# Patient Record
Sex: MALE | Race: WHITE | NOT HISPANIC OR LATINO | Employment: UNEMPLOYED | URBAN - METROPOLITAN AREA
[De-identification: names, ages, dates, MRNs, and addresses within clinical notes are randomized per-mention and may not be internally consistent; named-entity substitution may affect disease eponyms.]

---

## 2019-04-29 ENCOUNTER — HOSPITAL ENCOUNTER (OUTPATIENT)
Dept: URGENT CARE | Facility: CLINIC | Age: 34
Setting detail: SPECIMEN
Discharge: HOME | End: 2019-04-29
Attending: FAMILY MEDICINE | Admitting: FAMILY MEDICINE

## 2019-04-29 LAB
BACTERIA ISLT: NORMAL
GRAM STN SPEC: NORMAL
Lab: NORMAL
MICRO REPORT STATUS: NORMAL
SPECIMEN SOURCE: NORMAL
SPECIMEN SOURCE: NORMAL

## 2021-04-26 ENCOUNTER — OFFICE VISIT (OUTPATIENT)
Dept: FAMILY MEDICINE CLINIC | Facility: CLINIC | Age: 36
End: 2021-04-26

## 2021-04-26 VITALS
WEIGHT: 181 LBS | OXYGEN SATURATION: 100 % | HEART RATE: 80 BPM | DIASTOLIC BLOOD PRESSURE: 87 MMHG | TEMPERATURE: 97.5 F | RESPIRATION RATE: 18 BRPM | SYSTOLIC BLOOD PRESSURE: 133 MMHG | BODY MASS INDEX: 26.81 KG/M2 | HEIGHT: 69 IN

## 2021-04-26 DIAGNOSIS — R53.82 CHRONIC FATIGUE: ICD-10-CM

## 2021-04-26 DIAGNOSIS — F32.89 OTHER DEPRESSION: Primary | ICD-10-CM

## 2021-04-26 DIAGNOSIS — E55.9 VITAMIN D DEFICIENCY: ICD-10-CM

## 2021-04-26 DIAGNOSIS — Z11.59 NEED FOR HEPATITIS C SCREENING TEST: ICD-10-CM

## 2021-04-26 DIAGNOSIS — R73.09 ELEVATED GLUCOSE: ICD-10-CM

## 2021-04-26 PROBLEM — B19.20 HEPATITIS C VIRUS INFECTION: Status: ACTIVE | Noted: 2021-04-26

## 2021-04-26 PROBLEM — A49.02 METHICILLIN RESISTANT STAPHYLOCOCCUS AUREUS INFECTION: Status: ACTIVE | Noted: 2018-09-30

## 2021-04-26 PROCEDURE — 99214 OFFICE O/P EST MOD 30 MIN: CPT | Performed by: FAMILY MEDICINE

## 2021-04-26 RX ORDER — NALOXONE HYDROCHLORIDE 4 MG/.1ML
SPRAY NASAL 2 TIMES DAILY PRN
COMMUNITY
Start: 2021-04-06

## 2021-04-26 RX ORDER — LEVETIRACETAM 1000 MG/1
1000 TABLET ORAL 2 TIMES DAILY
COMMUNITY
Start: 2021-04-19

## 2021-04-26 RX ORDER — BUPRENORPHINE HYDROCHLORIDE AND NALOXONE HYDROCHLORIDE DIHYDRATE 8; 2 MG/1; MG/1
2 TABLET SUBLINGUAL DAILY
COMMUNITY
Start: 2021-04-24

## 2021-04-29 ENCOUNTER — LAB (OUTPATIENT)
Dept: LAB | Facility: HOSPITAL | Age: 36
End: 2021-04-29

## 2021-04-29 DIAGNOSIS — E55.9 VITAMIN D DEFICIENCY: ICD-10-CM

## 2021-04-29 DIAGNOSIS — Z11.59 NEED FOR HEPATITIS C SCREENING TEST: ICD-10-CM

## 2021-04-29 DIAGNOSIS — R53.82 CHRONIC FATIGUE: ICD-10-CM

## 2021-04-29 DIAGNOSIS — R73.09 ELEVATED GLUCOSE: ICD-10-CM

## 2021-04-29 LAB
25(OH)D3 SERPL-MCNC: 27.6 NG/ML (ref 30–100)
DEPRECATED RDW RBC AUTO: 46 FL (ref 37–54)
EOSINOPHIL # BLD MANUAL: 0.05 10*3/MM3 (ref 0–0.4)
EOSINOPHIL NFR BLD MANUAL: 1 % (ref 0.3–6.2)
ERYTHROCYTE [DISTWIDTH] IN BLOOD BY AUTOMATED COUNT: 14.7 % (ref 12.3–15.4)
HBA1C MFR BLD: 5.7 % (ref 3.5–5.6)
HCT VFR BLD AUTO: 49.4 % (ref 37.5–51)
HCV AB SER DONR QL: REACTIVE
HGB BLD-MCNC: 16.4 G/DL (ref 13–17.7)
LYMPHOCYTES # BLD MANUAL: 1.77 10*3/MM3 (ref 0.7–3.1)
LYMPHOCYTES NFR BLD MANUAL: 33 % (ref 19.6–45.3)
LYMPHOCYTES NFR BLD MANUAL: 6 % (ref 5–12)
MCH RBC QN AUTO: 28.7 PG (ref 26.6–33)
MCHC RBC AUTO-ENTMCNC: 33.2 G/DL (ref 31.5–35.7)
MCV RBC AUTO: 86.5 FL (ref 79–97)
MONOCYTES # BLD AUTO: 0.32 10*3/MM3 (ref 0.1–0.9)
NEUTROPHILS # BLD AUTO: 3.16 10*3/MM3 (ref 1.7–7)
NEUTROPHILS NFR BLD MANUAL: 59 % (ref 42.7–76)
OVALOCYTES BLD QL SMEAR: NORMAL
PLAT MORPH BLD: NORMAL
PLATELET # BLD AUTO: 254 10*3/MM3 (ref 140–450)
PMV BLD AUTO: 10.8 FL (ref 6–12)
POIKILOCYTOSIS BLD QL SMEAR: NORMAL
RBC # BLD AUTO: 5.71 10*6/MM3 (ref 4.14–5.8)
T4 FREE SERPL-MCNC: 1.26 NG/DL (ref 0.93–1.7)
TSH SERPL DL<=0.05 MIU/L-ACNC: 0.95 UIU/ML (ref 0.27–4.2)
WBC # BLD AUTO: 5.35 10*3/MM3 (ref 3.4–10.8)
WBC MORPH BLD: NORMAL

## 2021-04-29 PROCEDURE — 86803 HEPATITIS C AB TEST: CPT

## 2021-04-29 PROCEDURE — 85007 BL SMEAR W/DIFF WBC COUNT: CPT

## 2021-04-29 PROCEDURE — 84439 ASSAY OF FREE THYROXINE: CPT

## 2021-04-29 PROCEDURE — 36415 COLL VENOUS BLD VENIPUNCTURE: CPT

## 2021-04-29 PROCEDURE — 85025 COMPLETE CBC W/AUTO DIFF WBC: CPT

## 2021-04-29 PROCEDURE — 84443 ASSAY THYROID STIM HORMONE: CPT

## 2021-04-29 PROCEDURE — 83036 HEMOGLOBIN GLYCOSYLATED A1C: CPT

## 2021-04-29 PROCEDURE — 82306 VITAMIN D 25 HYDROXY: CPT

## 2021-05-02 DIAGNOSIS — R76.8 HEPATITIS C ANTIBODY POSITIVE IN BLOOD: Primary | ICD-10-CM

## 2021-05-03 ENCOUNTER — TELEPHONE (OUTPATIENT)
Dept: FAMILY MEDICINE CLINIC | Facility: CLINIC | Age: 36
End: 2021-05-03

## 2021-05-03 NOTE — TELEPHONE ENCOUNTER
HUB to share    Hep c screening  +  refer to GI  Low vit d take daily vit d3 2000 daily  Prediabetic level, low carb diet  Other labs acceptable  Call [facility] for appt for the hep C follow-up. #[phone]. I faxed them the referral and lab results.    LVM informing pt

## 2021-05-07 ENCOUNTER — TELEPHONE (OUTPATIENT)
Dept: FAMILY MEDICINE CLINIC | Facility: CLINIC | Age: 36
End: 2021-05-07

## 2021-05-07 NOTE — TELEPHONE ENCOUNTER
Caller: CARLO STILES    Relationship: Emergency Contact    Best call back number: [phone]    What is the medical concern/diagnosis: MENTAL HEALTH    What specialty or service is being requested: MENTAL HEALTH    What is the provider, practice or medical service name: ?    What is the office location: ?    What is the office phone number: ?    Any additional details: PATIENT'S WIFE CALLED STATING SHE HAD CALLED [facility] AND THEY ADVISED THEY DIDN'T SEE PATIENTS FROM THE  COUNTY THEY LIVE IN. PATIENT'S WIFE WANTED TO KNOW COULD SHE JUST CALL ANOTHER PROVIDER OR DOES HE NEED ANOTHER REFERRAL.

## 2021-05-10 NOTE — TELEPHONE ENCOUNTER
HUB to share    Referral faxed to [facility]. They will contact pt to sched. But pt can call them to sched also.    [facility]  p# [phone]  f# [phone]

## 2022-03-15 ENCOUNTER — TELEPHONE (OUTPATIENT)
Dept: FAMILY MEDICINE CLINIC | Facility: CLINIC | Age: 37
End: 2022-03-15

## 2022-03-15 NOTE — TELEPHONE ENCOUNTER
Caller: CARLO STILES    Relationship: Emergency Contact    Best call back number: [phone]     What is the best time to reach you: ANYTIME    SPOUSE CALLED TO CANCEL VICKY'S APPOINTMENT.  HOWEVER, SHE IS NOT ON HIS BH VERBAL.  I TRIED TO WARM TRANSFER NO ANSWER.

## 2022-05-06 ENCOUNTER — TELEPHONE (OUTPATIENT)
Dept: NEUROLOGY | Facility: OTHER | Age: 37
End: 2022-05-06
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
## 2022-05-09 ENCOUNTER — TELEPHONE (OUTPATIENT)
Dept: FAMILY MEDICINE CLINIC | Facility: CLINIC | Age: 37
End: 2022-05-09

## 2022-05-09 NOTE — TELEPHONE ENCOUNTER
WIFE CALLED IN AND STATED THE PATIENT IS A NEW PATIENT FOR [facility], WAS SCHEDULED BY THE HUB FROM DR JAYASHREE GALICIA. THE PATIENT IS IN [place] AT THIS TIME AND WILL NEED A LETTER FAXED TO HIS , ROSEANNA SETHI , WITH A CONFIRMATION OF APPOINTMENT.     THE APPOINTMENT IS FOR A REFERRAL TO NEUROLOGY       FAX [phone]

## 2022-05-12 NOTE — TELEPHONE ENCOUNTER
I have not seen any new messages from him and he did not show up yesterday so as of now he is not scheduled and is not listed as a patient.

## 2023-08-09 NOTE — PATIENT INSTRUCTIONS
Health Maintenance Due   Topic Date Due    Hepatitis B (1 of 3 - 3-dose series) Never done    COVID-19 Vaccine (1) Never done    Pneumococcal Vaccine 0-64 (1 - PCV) Never done    TDAP/TD VACCINES (1 - Tdap) Never done    ANNUAL PHYSICAL  Never done    You are due for adacel Tdap vaccination. (provides protection against tetanus, diptheria and whooping cough) Please  get the immunization at your local pharmacy at your earliest convenience. This immunization will next be due in 10 years. Please click on the link for more information about this vaccine.    CDC Tdap Vaccine Information

## 2023-08-15 ENCOUNTER — OFFICE VISIT (OUTPATIENT)
Dept: FAMILY MEDICINE CLINIC | Facility: CLINIC | Age: 38
End: 2023-08-15
Payer: MEDICAID

## 2023-08-15 VITALS
SYSTOLIC BLOOD PRESSURE: 117 MMHG | TEMPERATURE: 98 F | HEART RATE: 97 BPM | HEIGHT: 69 IN | DIASTOLIC BLOOD PRESSURE: 84 MMHG | OXYGEN SATURATION: 98 % | BODY MASS INDEX: 27.7 KG/M2 | WEIGHT: 187 LBS

## 2023-08-15 DIAGNOSIS — B19.20 HEPATITIS C VIRUS INFECTION WITHOUT HEPATIC COMA, UNSPECIFIED CHRONICITY: ICD-10-CM

## 2023-08-15 DIAGNOSIS — Z00.01 ENCOUNTER FOR GENERAL ADULT MEDICAL EXAMINATION WITH ABNORMAL FINDINGS: Primary | ICD-10-CM

## 2023-08-15 DIAGNOSIS — R56.1 SEIZURE AFTER HEAD INJURY: ICD-10-CM

## 2023-08-15 DIAGNOSIS — F19.11 HISTORY OF SUBSTANCE ABUSE: ICD-10-CM

## 2023-08-15 DIAGNOSIS — F29 PSYCHOSIS, UNSPECIFIED PSYCHOSIS TYPE: ICD-10-CM

## 2023-08-15 DIAGNOSIS — R73.9 HYPERGLYCEMIA: ICD-10-CM

## 2023-08-15 PROCEDURE — 99214 OFFICE O/P EST MOD 30 MIN: CPT | Performed by: NURSE PRACTITIONER

## 2023-08-15 RX ORDER — QUETIAPINE FUMARATE 100 MG/1
100 TABLET, FILM COATED ORAL NIGHTLY
COMMUNITY
Start: 2023-08-14
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
## 2023-08-25 ENCOUNTER — PATIENT ROUNDING (BHMG ONLY) (OUTPATIENT)
Dept: FAMILY MEDICINE CLINIC | Facility: CLINIC | Age: 38
End: 2023-08-25
Payer: MEDICAID

## 2023-08-25 NOTE — PROGRESS NOTES
A [employer] message has been sent to the patient for patient rounding with [facility]

## 2023-08-28 ENCOUNTER — TELEPHONE (OUTPATIENT)
Dept: FAMILY MEDICINE CLINIC | Facility: CLINIC | Age: 38
End: 2023-08-28
Payer: MEDICAID

## 2023-08-29 ENCOUNTER — CLINICAL SUPPORT (OUTPATIENT)
Dept: FAMILY MEDICINE CLINIC | Facility: CLINIC | Age: 38
End: 2023-08-29
Payer: MEDICAID

## 2023-08-29 DIAGNOSIS — Z00.01 ENCOUNTER FOR GENERAL ADULT MEDICAL EXAMINATION WITH ABNORMAL FINDINGS: ICD-10-CM

## 2023-08-29 DIAGNOSIS — R73.9 HYPERGLYCEMIA: ICD-10-CM

## 2023-08-29 DIAGNOSIS — R56.1 SEIZURE AFTER HEAD INJURY: ICD-10-CM

## 2023-08-29 LAB
ALBUMIN SERPL-MCNC: 4.2 G/DL (ref 3.5–5.2)
ALBUMIN/GLOB SERPL: 1.3 G/DL
ALP SERPL-CCNC: 74 U/L (ref 39–117)
ALT SERPL W P-5'-P-CCNC: 14 U/L (ref 1–41)
ANION GAP SERPL CALCULATED.3IONS-SCNC: 11.4 MMOL/L (ref 5–15)
AST SERPL-CCNC: 18 U/L (ref 1–40)
BASOPHILS # BLD AUTO: 0.02 10*3/MM3 (ref 0–0.2)
BASOPHILS NFR BLD AUTO: 0.3 % (ref 0–1.5)
BILIRUB SERPL-MCNC: 0.5 MG/DL (ref 0–1.2)
BUN SERPL-MCNC: 10 MG/DL (ref 6–20)
BUN/CREAT SERPL: 11.2 (ref 7–25)
CALCIUM SPEC-SCNC: 9.5 MG/DL (ref 8.6–10.5)
CHLORIDE SERPL-SCNC: 104 MMOL/L (ref 98–107)
CHOLEST SERPL-MCNC: 223 MG/DL (ref 0–200)
CO2 SERPL-SCNC: 24.6 MMOL/L (ref 22–29)
CREAT SERPL-MCNC: 0.89 MG/DL (ref 0.76–1.27)
DEPRECATED RDW RBC AUTO: 42.4 FL (ref 37–54)
EGFRCR SERPLBLD CKD-EPI 2021: 112.5 ML/MIN/1.73
EOSINOPHIL # BLD AUTO: 0.38 10*3/MM3 (ref 0–0.4)
EOSINOPHIL NFR BLD AUTO: 6 % (ref 0.3–6.2)
ERYTHROCYTE [DISTWIDTH] IN BLOOD BY AUTOMATED COUNT: 13.6 % (ref 12.3–15.4)
GLOBULIN UR ELPH-MCNC: 3.3 GM/DL
GLUCOSE SERPL-MCNC: 87 MG/DL (ref 65–99)
HBA1C MFR BLD: 5.4 % (ref 4.8–5.6)
HCT VFR BLD AUTO: 45.1 % (ref 37.5–51)
HDLC SERPL-MCNC: 38 MG/DL (ref 40–60)
HGB BLD-MCNC: 15.5 G/DL (ref 13–17.7)
IMM GRANULOCYTES # BLD AUTO: 0.02 10*3/MM3 (ref 0–0.05)
IMM GRANULOCYTES NFR BLD AUTO: 0.3 % (ref 0–0.5)
LDLC SERPL CALC-MCNC: 161 MG/DL (ref 0–100)
LDLC/HDLC SERPL: 4.19 {RATIO}
LYMPHOCYTES # BLD AUTO: 1.97 10*3/MM3 (ref 0.7–3.1)
LYMPHOCYTES NFR BLD AUTO: 31.2 % (ref 19.6–45.3)
MCH RBC QN AUTO: 29.5 PG (ref 26.6–33)
MCHC RBC AUTO-ENTMCNC: 34.4 G/DL (ref 31.5–35.7)
MCV RBC AUTO: 85.7 FL (ref 79–97)
MONOCYTES # BLD AUTO: 0.57 10*3/MM3 (ref 0.1–0.9)
MONOCYTES NFR BLD AUTO: 9 % (ref 5–12)
NEUTROPHILS NFR BLD AUTO: 3.35 10*3/MM3 (ref 1.7–7)
NEUTROPHILS NFR BLD AUTO: 53.2 % (ref 42.7–76)
NRBC BLD AUTO-RTO: 0 /100 WBC (ref 0–0.2)
PLATELET # BLD AUTO: 199 10*3/MM3 (ref 140–450)
PMV BLD AUTO: 11.3 FL (ref 6–12)
POTASSIUM SERPL-SCNC: 4.2 MMOL/L (ref 3.5–5.2)
PROT SERPL-MCNC: 7.5 G/DL (ref 6–8.5)
RBC # BLD AUTO: 5.26 10*6/MM3 (ref 4.14–5.8)
SODIUM SERPL-SCNC: 140 MMOL/L (ref 136–145)
TRIGL SERPL-MCNC: 129 MG/DL (ref 0–150)
TSH SERPL DL<=0.05 MIU/L-ACNC: 0.95 UIU/ML (ref 0.27–4.2)
VLDLC SERPL-MCNC: 24 MG/DL (ref 5–40)
WBC NRBC COR # BLD: 6.31 10*3/MM3 (ref 3.4–10.8)

## 2023-08-29 PROCEDURE — 80061 LIPID PANEL: CPT | Performed by: NURSE PRACTITIONER

## 2023-08-29 PROCEDURE — 36415 COLL VENOUS BLD VENIPUNCTURE: CPT | Performed by: NURSE PRACTITIONER

## 2023-08-29 PROCEDURE — 80050 GENERAL HEALTH PANEL: CPT | Performed by: NURSE PRACTITIONER

## 2023-08-29 PROCEDURE — 83036 HEMOGLOBIN GLYCOSYLATED A1C: CPT | Performed by: NURSE PRACTITIONER

## 2023-08-29 PROCEDURE — 80177 DRUG SCRN QUAN LEVETIRACETAM: CPT | Performed by: NURSE PRACTITIONER

## 2023-08-29 NOTE — PROGRESS NOTES
Venipuncture performed on Left Arm by Gladys Dent MA  with good hemostasis. Patient tolerated well. 08/29/23  Joselin Branch MD

## 2023-08-31 LAB — LEVETIRACETAM SERPL-MCNC: 24 UG/ML (ref 10–40)

## 2023-09-18 ENCOUNTER — TELEMEDICINE (OUTPATIENT)
Dept: PSYCHIATRY | Facility: CLINIC | Age: 38
End: 2023-09-18
Payer: MEDICAID

## 2023-09-18 DIAGNOSIS — R44.0 AUDITORY HALLUCINATIONS: Primary | ICD-10-CM

## 2023-09-18 PROCEDURE — 90792 PSYCH DIAG EVAL W/MED SRVCS: CPT | Performed by: NURSE PRACTITIONER

## 2023-09-18 PROCEDURE — 1160F RVW MEDS BY RX/DR IN RCRD: CPT | Performed by: NURSE PRACTITIONER

## 2023-09-18 PROCEDURE — 1159F MED LIST DOCD IN RCRD: CPT | Performed by: NURSE PRACTITIONER

## 2023-09-18 RX ORDER — QUETIAPINE 150 MG/1
150 TABLET, FILM COATED, EXTENDED RELEASE ORAL NIGHTLY
Qty: 30 TABLET | Refills: 1 | Status: SHIPPED | OUTPATIENT
Start: 2023-09-18
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
## 2023-09-21 RX ORDER — LEVETIRACETAM 1000 MG/1
1000 TABLET ORAL 2 TIMES DAILY
Qty: 60 TABLET | Refills: 0 | Status: SHIPPED | OUTPATIENT
Start: 2023-09-21 | End: 2023-10-21

## 2023-10-09 ENCOUNTER — TELEMEDICINE (OUTPATIENT)
Dept: PSYCHIATRY | Facility: CLINIC | Age: 38
End: 2023-10-09
Payer: MEDICAID

## 2023-10-09 ENCOUNTER — PRIOR AUTHORIZATION (OUTPATIENT)
Dept: PSYCHIATRY | Facility: CLINIC | Age: 38
End: 2023-10-09

## 2023-10-09 DIAGNOSIS — R44.0 AUDITORY HALLUCINATIONS: Primary | ICD-10-CM

## 2023-10-09 PROCEDURE — 99214 OFFICE O/P EST MOD 30 MIN: CPT | Performed by: NURSE PRACTITIONER

## 2023-10-09 PROCEDURE — 1159F MED LIST DOCD IN RCRD: CPT | Performed by: NURSE PRACTITIONER

## 2023-10-09 PROCEDURE — 1160F RVW MEDS BY RX/DR IN RCRD: CPT | Performed by: NURSE PRACTITIONER

## 2023-10-09 NOTE — PROGRESS NOTES
Patient Name: Neeraj Martin  MRN: [id number]   :  1985     This provider is located at her home office through the [facility] (through [facility]), [address], [address] using a secure [employer] Video Visit through [email or web address]. Patient is being seen remotely via telehealth at their home address in [place], and stated they are in a secure environment for this session. The patient's condition being diagnosed/treated is appropriate for telemedicine. The provider identified herself as well as her credentials.   The patient, and/or patients guardian, consent to be seen remotely, and when consent is given they understand that the consent allows for patient identifiable information to be sent to a third party as needed.   They may refuse to be seen remotely at any time. The electronic data is encrypted and password protected, and the patient and/or guardian has been advised of the potential risks to privacy not withstanding such measures.    You have chosen to receive care through a telehealth visit.  Do you consent to use a video/audio connection for your medical care today? Yes    Chief Complaint:      ICD-10-CM ICD-9-CM   1. Auditory hallucinations  R44.0 780.1       History of Present Illness: Neeraj Martin is a 38 y.o. male is here today for medication management follow up.  Patient stopped taking the Seroquel secondary to significant sedation.  Patient obviously still feels the same since he is not taking any medication.    The following portions of the patient's history were reviewed and updated as appropriate: allergies, current medications, past family history, past medical history, past social history, past surgical history, and problem list.    Review of Systems;;  Review of Systems   Constitutional:  Negative for activity change, appetite change, fatigue, unexpected weight gain and unexpected weight loss.   Respiratory:  Negative for shortness of breath  and wheezing.    Gastrointestinal:  Negative for constipation, diarrhea, nausea and vomiting.   Musculoskeletal:  Negative for gait problem.   Skin:  Negative for dry skin and rash.   Neurological:  Negative for dizziness, speech difficulty, weakness, light-headedness, headache, memory problem and confusion.   Psychiatric/Behavioral:  Positive for agitation. Negative for behavioral problems, decreased concentration, dysphoric mood, hallucinations, self-injury, sleep disturbance, suicidal ideas, negative for hyperactivity, depressed mood and stress. The patient is not nervous/anxious.        Physical Exam;;  Physical Exam  Constitutional:       General: He is not in acute distress.     Appearance: He is well-developed. He is not diaphoretic.   HENT:      Head: Normocephalic and atraumatic.   Eyes:      Conjunctiva/sclera: Conjunctivae normal.   Pulmonary:      Effort: Pulmonary effort is normal. No respiratory distress.   Musculoskeletal:         General: Normal range of motion.      Cervical back: Full passive range of motion without pain and normal range of motion.   Neurological:      Mental Status: He is alert and oriented to person, place, and time.   Psychiatric:         Attention and Perception: He perceives auditory hallucinations.         Mood and Affect: Mood is not anxious or depressed. Affect is not labile, blunt, angry or inappropriate.         Speech: Speech is not rapid and pressured or tangential.         Behavior: Behavior normal. Behavior is not agitated, slowed, aggressive, withdrawn, hyperactive or combative. Behavior is cooperative.         Thought Content: Thought content normal. Thought content is not paranoid or delusional. Thought content does not include homicidal or suicidal ideation. Thought content does not include homicidal or suicidal plan.         Judgment: Judgment normal.       There were no vitals taken for this visit.  There is no height or weight on file to calculate BMI. Video  appt unable to obtain.     Current Medications;;    Current Outpatient Medications:     Cariprazine HCl (Vraylar) 3 MG capsule capsule, Take 1 capsule by mouth Daily., Disp: 30 capsule, Rfl: 1    levETIRAcetam (KEPPRA) 1000 MG tablet, Take 1 tablet by mouth 2 (Two) Times a Day for 30 days., Disp: 60 tablet, Rfl: 0    Narcan 4 MG/0.1ML nasal spray, 2 (Two) Times a Day As Needed., Disp: , Rfl:     Lab Results:   Clinical Support on 08/29/2023   Component Date Value Ref Range Status    WBC 08/29/2023 6.31  3.40 - 10.80 10*3/mm3 Final    RBC 08/29/2023 5.26  4.14 - 5.80 10*6/mm3 Final    Hemoglobin 08/29/2023 15.5  13.0 - 17.7 g/dL Final    Hematocrit 08/29/2023 45.1  37.5 - 51.0 % Final    MCV 08/29/2023 85.7  79.0 - 97.0 fL Final    MCH 08/29/2023 29.5  26.6 - 33.0 pg Final    MCHC 08/29/2023 34.4  31.5 - 35.7 g/dL Final    RDW 08/29/2023 13.6  12.3 - 15.4 % Final    RDW-SD 08/29/2023 42.4  37.0 - 54.0 fl Final    MPV 08/29/2023 11.3  6.0 - 12.0 fL Final    Platelets 08/29/2023 199  140 - 450 10*3/mm3 Final    Neutrophil % 08/29/2023 53.2  42.7 - 76.0 % Final    Lymphocyte % 08/29/2023 31.2  19.6 - 45.3 % Final    Monocyte % 08/29/2023 9.0  5.0 - 12.0 % Final    Eosinophil % 08/29/2023 6.0  0.3 - 6.2 % Final    Basophil % 08/29/2023 0.3  0.0 - 1.5 % Final    Immature Grans % 08/29/2023 0.3  0.0 - 0.5 % Final    Neutrophils, Absolute 08/29/2023 3.35  1.70 - 7.00 10*3/mm3 Final    Lymphocytes, Absolute 08/29/2023 1.97  0.70 - 3.10 10*3/mm3 Final    Monocytes, Absolute 08/29/2023 0.57  0.10 - 0.90 10*3/mm3 Final    Eosinophils, Absolute 08/29/2023 0.38  0.00 - 0.40 10*3/mm3 Final    Basophils, Absolute 08/29/2023 0.02  0.00 - 0.20 10*3/mm3 Final    Immature Grans, Absolute 08/29/2023 0.02  0.00 - 0.05 10*3/mm3 Final    nRBC 08/29/2023 0.0  0.0 - 0.2 /100 WBC Final    Glucose 08/29/2023 87  65 - 99 mg/dL Final    BUN 08/29/2023 10  6 - 20 mg/dL Final    Creatinine 08/29/2023 0.89  0.76 - 1.27 mg/dL Final    Sodium  08/29/2023 140  136 - 145 mmol/L Final    Potassium 08/29/2023 4.2  3.5 - 5.2 mmol/L Final    Chloride 08/29/2023 104  98 - 107 mmol/L Final    CO2 08/29/2023 24.6  22.0 - 29.0 mmol/L Final    Calcium 08/29/2023 9.5  8.6 - 10.5 mg/dL Final    Total Protein 08/29/2023 7.5  6.0 - 8.5 g/dL Final    Albumin 08/29/2023 4.2  3.5 - 5.2 g/dL Final    ALT (SGPT) 08/29/2023 14  1 - 41 U/L Final    AST (SGOT) 08/29/2023 18  1 - 40 U/L Final    Alkaline Phosphatase 08/29/2023 74  39 - 117 U/L Final    Total Bilirubin 08/29/2023 0.5  0.0 - 1.2 mg/dL Final    Globulin 08/29/2023 3.3  gm/dL Final    A/G Ratio 08/29/2023 1.3  g/dL Final    BUN/Creatinine Ratio 08/29/2023 11.2  7.0 - 25.0 Final    Anion Gap 08/29/2023 11.4  5.0 - 15.0 mmol/L Final    eGFR 08/29/2023 112.5  >60.0 mL/min/1.73 Final    Total Cholesterol 08/29/2023 223 (H)  0 - 200 mg/dL Final    Triglycerides 08/29/2023 129  0 - 150 mg/dL Final    HDL Cholesterol 08/29/2023 38 (L)  40 - 60 mg/dL Final    LDL Cholesterol  08/29/2023 161 (H)  0 - 100 mg/dL Final    VLDL Cholesterol 08/29/2023 24  5 - 40 mg/dL Final    LDL/HDL Ratio 08/29/2023 4.19   Final    TSH 08/29/2023 0.946  0.270 - 4.200 uIU/mL Final    Hemoglobin A1C 08/29/2023 5.40  4.80 - 5.60 % Final    Levetiracetam 08/29/2023 24.0  10.0 - 40.0 ug/mL Final       Mental Status Exam:   Hygiene:   good  Cooperation:  Cooperative  Eye Contact:  Good  Psychomotor Behavior:  Appropriate  Mood:irritable  Affect:  Restricted  Hopelessness: Denies  Speech:  Normal  Thought Process:  Goal directed  Thought Content:  Normal  Suicidal:  None  Homicidal:  None  Hallucinations:  Auditory  Delusion:  Paranoid  Memory:  Intact  Orientation:  Person, Place, Time, and Situation  Reliability:  fair  Insight:  Fair  Judgement:  Fair  Impulse Control:  Good    PHQ-9 Depression Screening  Little interest or pleasure in doing things? (P) 3-->nearly every day   Feeling down, depressed, or hopeless? (P) 2-->more than half the days    Trouble falling or staying asleep, or sleeping too much? (P) 2-->more than half the days   Feeling tired or having little energy? (P) 3-->nearly every day   Poor appetite or overeating? (P) 3-->nearly every day   Feeling bad about yourself - or that you are a failure or have let yourself or your family down? (P) 1-->several days   Trouble concentrating on things, such as reading the newspaper or watching television? (P) 1-->several days   Moving or speaking so slowly that other people could have noticed? Or the opposite - being so fidgety or restless that you have been moving around a lot more than usual? (P) 1-->several days   Thoughts that you would be better off dead, or of hurting yourself in some way? (P) 2-->more than half the days   PHQ-9 Total Score (P) 18   If you checked off any problems, how difficult have these problems made it for you to do your work, take care of things at home, or get along with other people? (P) extremely difficult        Assessment/Plan:  Diagnoses and all orders for this visit:    1. Auditory hallucinations (Primary)  -     Cariprazine HCl (Vraylar) 3 MG capsule capsule; Take 1 capsule by mouth Daily.  Dispense: 30 capsule; Refill: 1      Patient would not take Seroquel due to significant sedation.  We will start Vraylar 3 mg daily.    A psychological evaluation was conducted in order to assess past and current level of functioning. Areas assessed included, but were not limited to: perception of social support, perception of ability to face and deal with challenges in life (positive functioning), anxiety symptoms, depressive symptoms, perspective on beliefs/belief system, coping skills for stress, intelligence level,  Therapeutic rapport was established. Interventions conducted today were geared towards incorporating medication management along with support for continued therapy. Education was also provided as to the med management with this provider and what to expect in  subsequent sessions.    We discussed risks, benefits,goals and side effects of the above medication and the patient was agreeable with the plan.Patient was educated on the importance of compliance with treatment and follow-up appointments. Patient is aware to contact the [facility] [phone] with any worsening of symptoms. To call for questions or concerns and return early if necessary. Patent is agreeable to go to the Emergency Department or call 911 should they begin SI/HI.     Part of this note may be an electronic transcription/translation of spoken language to printed text using the Dragon Dictation System.    Return in about 3 weeks (around 10/30/2023) for Follow Up 30 min, Video visit.    MARYCARMEN Bernal

## 2023-10-15 RX ORDER — LEVETIRACETAM 1000 MG/1
1000 TABLET ORAL 2 TIMES DAILY
Qty: 60 TABLET | Refills: 0 | Status: SHIPPED | OUTPATIENT
Start: 2023-10-15

## 2023-10-16 ENCOUNTER — PRIOR AUTHORIZATION (OUTPATIENT)
Dept: PSYCHIATRY | Facility: CLINIC | Age: 38
End: 2023-10-16
Payer: MEDICAID

## 2023-11-16 ENCOUNTER — TELEMEDICINE (OUTPATIENT)
Dept: PSYCHIATRY | Facility: CLINIC | Age: 38
End: 2023-11-16
Payer: MEDICAID

## 2023-11-16 DIAGNOSIS — R44.0 AUDITORY HALLUCINATIONS: Primary | ICD-10-CM

## 2023-11-16 PROCEDURE — 99214 OFFICE O/P EST MOD 30 MIN: CPT | Performed by: NURSE PRACTITIONER

## 2023-11-16 PROCEDURE — 1159F MED LIST DOCD IN RCRD: CPT | Performed by: NURSE PRACTITIONER

## 2023-11-16 PROCEDURE — 1160F RVW MEDS BY RX/DR IN RCRD: CPT | Performed by: NURSE PRACTITIONER

## 2023-11-16 NOTE — PROGRESS NOTES
Patient Name: Neeraj Martin  MRN: [id number]   :  1985     This provider is located at her home office through the [facility] (through [facility]), [address], [address] using a secure [employer] Video Visit through [employer]. Patient is being seen remotely via telehealth at their home address in [place], and stated they are in a secure environment for this session. The patient's condition being diagnosed/treated is appropriate for telemedicine. The provider identified herself as well as her credentials.   The patient, and/or patients guardian, consent to be seen remotely, and when consent is given they understand that the consent allows for patient identifiable information to be sent to a third party as needed.   They may refuse to be seen remotely at any time. The electronic data is encrypted and password protected, and the patient and/or guardian has been advised of the potential risks to privacy not withstanding such measures.    You have chosen to receive care through a telehealth visit.  Do you consent to use a video/audio connection for your medical care today? Yes    Chief Complaint:      ICD-10-CM ICD-9-CM   1. Auditory hallucinations  R44.0 780.1       History of Present Illness: Neeraj Martin is a 38 y.o. male is here today for medication management follow up.  Patient states he does not notice any side effects however does not notice improvement either.  States he thought his wife was out all night when she was actually home.  Still feeling some paranoia and still having auditory hallucinations.    The following portions of the patient's history were reviewed and updated as appropriate: allergies, current medications, past family history, past medical history, past social history, past surgical history, and problem list.    Review of Systems;;  Review of Systems   Constitutional:  Negative for activity change, appetite change, fatigue,  unexpected weight gain and unexpected weight loss.   Respiratory:  Negative for shortness of breath and wheezing.    Gastrointestinal:  Negative for constipation, diarrhea, nausea and vomiting.   Musculoskeletal:  Negative for gait problem.   Skin:  Negative for dry skin and rash.   Neurological:  Negative for dizziness, speech difficulty, weakness, light-headedness, headache, memory problem and confusion.   Psychiatric/Behavioral:  Positive for agitation, depressed mood and stress. Negative for behavioral problems, decreased concentration, dysphoric mood, hallucinations, self-injury, sleep disturbance, suicidal ideas and negative for hyperactivity. The patient is nervous/anxious.        Physical Exam;;  Physical Exam  Constitutional:       General: He is not in acute distress.     Appearance: He is well-developed. He is not diaphoretic.   HENT:      Head: Normocephalic and atraumatic.   Eyes:      Conjunctiva/sclera: Conjunctivae normal.   Pulmonary:      Effort: Pulmonary effort is normal. No respiratory distress.   Musculoskeletal:         General: Normal range of motion.      Cervical back: Full passive range of motion without pain and normal range of motion.   Neurological:      Mental Status: He is alert and oriented to person, place, and time.   Psychiatric:         Attention and Perception: He perceives auditory hallucinations.         Mood and Affect: Mood is anxious and depressed. Affect is not labile, blunt, angry or inappropriate.         Speech: Speech is not rapid and pressured or tangential.         Behavior: Behavior normal. Behavior is not agitated, slowed, aggressive, withdrawn, hyperactive or combative. Behavior is cooperative.         Thought Content: Thought content normal. Thought content is paranoid. Thought content is not delusional. Thought content does not include homicidal or suicidal ideation. Thought content does not include homicidal or suicidal plan.         Judgment: Judgment normal.        There were no vitals taken for this visit.  There is no height or weight on file to calculate BMI. Video appt unable to obtain.     Current Medications;;    Current Outpatient Medications:     Cariprazine HCl (Vraylar) 4.5 MG capsule capsule, Take 1 capsule by mouth Daily., Disp: 30 capsule, Rfl: 1    levETIRAcetam (KEPPRA) 1000 MG tablet, Take 1 tablet by mouth twice daily for 30 days, Disp: 60 tablet, Rfl: 0    Narcan 4 MG/0.1ML nasal spray, 2 (Two) Times a Day As Needed., Disp: , Rfl:     Lab Results:   Clinical Support on 08/29/2023   Component Date Value Ref Range Status    WBC 08/29/2023 6.31  3.40 - 10.80 10*3/mm3 Final    RBC 08/29/2023 5.26  4.14 - 5.80 10*6/mm3 Final    Hemoglobin 08/29/2023 15.5  13.0 - 17.7 g/dL Final    Hematocrit 08/29/2023 45.1  37.5 - 51.0 % Final    MCV 08/29/2023 85.7  79.0 - 97.0 fL Final    MCH 08/29/2023 29.5  26.6 - 33.0 pg Final    MCHC 08/29/2023 34.4  31.5 - 35.7 g/dL Final    RDW 08/29/2023 13.6  12.3 - 15.4 % Final    RDW-SD 08/29/2023 42.4  37.0 - 54.0 fl Final    MPV 08/29/2023 11.3  6.0 - 12.0 fL Final    Platelets 08/29/2023 199  140 - 450 10*3/mm3 Final    Neutrophil % 08/29/2023 53.2  42.7 - 76.0 % Final    Lymphocyte % 08/29/2023 31.2  19.6 - 45.3 % Final    Monocyte % 08/29/2023 9.0  5.0 - 12.0 % Final    Eosinophil % 08/29/2023 6.0  0.3 - 6.2 % Final    Basophil % 08/29/2023 0.3  0.0 - 1.5 % Final    Immature Grans % 08/29/2023 0.3  0.0 - 0.5 % Final    Neutrophils, Absolute 08/29/2023 3.35  1.70 - 7.00 10*3/mm3 Final    Lymphocytes, Absolute 08/29/2023 1.97  0.70 - 3.10 10*3/mm3 Final    Monocytes, Absolute 08/29/2023 0.57  0.10 - 0.90 10*3/mm3 Final    Eosinophils, Absolute 08/29/2023 0.38  0.00 - 0.40 10*3/mm3 Final    Basophils, Absolute 08/29/2023 0.02  0.00 - 0.20 10*3/mm3 Final    Immature Grans, Absolute 08/29/2023 0.02  0.00 - 0.05 10*3/mm3 Final    nRBC 08/29/2023 0.0  0.0 - 0.2 /100 WBC Final    Glucose 08/29/2023 87  65 - 99 mg/dL Final    BUN  08/29/2023 10  6 - 20 mg/dL Final    Creatinine 08/29/2023 0.89  0.76 - 1.27 mg/dL Final    Sodium 08/29/2023 140  136 - 145 mmol/L Final    Potassium 08/29/2023 4.2  3.5 - 5.2 mmol/L Final    Chloride 08/29/2023 104  98 - 107 mmol/L Final    CO2 08/29/2023 24.6  22.0 - 29.0 mmol/L Final    Calcium 08/29/2023 9.5  8.6 - 10.5 mg/dL Final    Total Protein 08/29/2023 7.5  6.0 - 8.5 g/dL Final    Albumin 08/29/2023 4.2  3.5 - 5.2 g/dL Final    ALT (SGPT) 08/29/2023 14  1 - 41 U/L Final    AST (SGOT) 08/29/2023 18  1 - 40 U/L Final    Alkaline Phosphatase 08/29/2023 74  39 - 117 U/L Final    Total Bilirubin 08/29/2023 0.5  0.0 - 1.2 mg/dL Final    Globulin 08/29/2023 3.3  gm/dL Final    A/G Ratio 08/29/2023 1.3  g/dL Final    BUN/Creatinine Ratio 08/29/2023 11.2  7.0 - 25.0 Final    Anion Gap 08/29/2023 11.4  5.0 - 15.0 mmol/L Final    eGFR 08/29/2023 112.5  >60.0 mL/min/1.73 Final    Total Cholesterol 08/29/2023 223 (H)  0 - 200 mg/dL Final    Triglycerides 08/29/2023 129  0 - 150 mg/dL Final    HDL Cholesterol 08/29/2023 38 (L)  40 - 60 mg/dL Final    LDL Cholesterol  08/29/2023 161 (H)  0 - 100 mg/dL Final    VLDL Cholesterol 08/29/2023 24  5 - 40 mg/dL Final    LDL/HDL Ratio 08/29/2023 4.19   Final    TSH 08/29/2023 0.946  0.270 - 4.200 uIU/mL Final    Hemoglobin A1C 08/29/2023 5.40  4.80 - 5.60 % Final    Levetiracetam 08/29/2023 24.0  10.0 - 40.0 ug/mL Final       Mental Status Exam:   Hygiene:   good  Cooperation:  Cooperative  Eye Contact:  Good  Psychomotor Behavior:  Appropriate  Mood:anxious, depressed, and irritable  Affect:  Appropriate  Hopelessness: Denies  Speech:  Normal  Thought Process:  Disorganized  Thought Content:  Normal  Suicidal:  None  Homicidal:  None  Hallucinations:  Auditory  Delusion:  Paranoid  Memory:  Intact  Orientation:  Person, Place, Time, and Situation  Reliability:  good  Insight:  Good  Judgement:  Good  Impulse Control:  Good    PHQ-9 Depression Screening  Little interest or  pleasure in doing things? (P) 3-->nearly every day   Feeling down, depressed, or hopeless? (P) 3-->nearly every day   Trouble falling or staying asleep, or sleeping too much? (P) 3-->nearly every day   Feeling tired or having little energy? (P) 3-->nearly every day   Poor appetite or overeating? (P) 3-->nearly every day   Feeling bad about yourself - or that you are a failure or have let yourself or your family down? (P) 1-->several days   Trouble concentrating on things, such as reading the newspaper or watching television? (P) 3-->nearly every day   Moving or speaking so slowly that other people could have noticed? Or the opposite - being so fidgety or restless that you have been moving around a lot more than usual? (P) 3-->nearly every day   Thoughts that you would be better off dead, or of hurting yourself in some way? (P) 0-->not at all   PHQ-9 Total Score (P) 22   If you checked off any problems, how difficult have these problems made it for you to do your work, take care of things at home, or get along with other people? (P) extremely difficult        Assessment/Plan:  Diagnoses and all orders for this visit:    1. Auditory hallucinations (Primary)  -     Cariprazine HCl (Vraylar) 4.5 MG capsule capsule; Take 1 capsule by mouth Daily.  Dispense: 30 capsule; Refill: 1      Patient not noticing any side effects however not noticing any improvement either.  We will increase Vraylar to 4.5 mg daily.  We may potentially have to hit the max of 6 mg for auditory hallucinations and paranoia.    A psychological evaluation was conducted in order to assess past and current level of functioning. Areas assessed included, but were not limited to: perception of social support, perception of ability to face and deal with challenges in life (positive functioning), anxiety symptoms, depressive symptoms, perspective on beliefs/belief system, coping skills for stress, intelligence level,  Therapeutic rapport was established.  Interventions conducted today were geared towards incorporating medication management along with support for continued therapy. Education was also provided as to the med management with this provider and what to expect in subsequent sessions.    We discussed risks, benefits,goals and side effects of the above medication and the patient was agreeable with the plan.Patient was educated on the importance of compliance with treatment and follow-up appointments. Patient is aware to contact the [facility] [phone] with any worsening of symptoms. To call for questions or concerns and return early if necessary. Patent is agreeable to go to the Emergency Department or call 911 should they begin SI/HI.     Part of this note may be an electronic transcription/translation of spoken language to printed text using the Dragon Dictation System.    Return in about 4 weeks (around 12/14/2023) for Follow Up 30 min, Video visit.    Fiona Grace, APRN

## 2023-12-25 RX ORDER — LEVETIRACETAM 1000 MG/1
1000 TABLET ORAL 2 TIMES DAILY
Qty: 60 TABLET | Refills: 0 | Status: ON HOLD | OUTPATIENT
Start: 2023-12-25

## 2023-12-29 ENCOUNTER — APPOINTMENT (OUTPATIENT)
Dept: CT IMAGING | Facility: HOSPITAL | Age: 38
DRG: 062 | End: 2023-12-29
Payer: OTHER GOVERNMENT

## 2023-12-29 ENCOUNTER — APPOINTMENT (OUTPATIENT)
Dept: GENERAL RADIOLOGY | Facility: HOSPITAL | Age: 38
DRG: 062 | End: 2023-12-29
Payer: OTHER GOVERNMENT

## 2023-12-29 ENCOUNTER — HOSPITAL ENCOUNTER (INPATIENT)
Facility: HOSPITAL | Age: 38
LOS: 5 days | Discharge: HOME | DRG: 062 | End: 2024-01-04
Attending: EMERGENCY MEDICINE | Admitting: INTERNAL MEDICINE
Payer: OTHER GOVERNMENT

## 2023-12-29 ENCOUNTER — APPOINTMENT (OUTPATIENT)
Dept: GENERAL RADIOLOGY | Facility: HOSPITAL | Age: 38
DRG: 062 | End: 2023-12-29
Payer: MEDICAID

## 2023-12-29 DIAGNOSIS — R53.1 ACUTE LEFT-SIDED WEAKNESS: ICD-10-CM

## 2023-12-29 DIAGNOSIS — R56.9 SEIZURE: Primary | ICD-10-CM

## 2023-12-29 DIAGNOSIS — I69.354 HEMIPARESIS AFFECTING LEFT SIDE AS LATE EFFECT OF CEREBROVASCULAR ACCIDENT: Chronic | ICD-10-CM

## 2023-12-29 DIAGNOSIS — G40.909 SEIZURE DISORDER: ICD-10-CM

## 2023-12-29 DIAGNOSIS — R47.01 APHASIA: ICD-10-CM

## 2023-12-29 LAB
ABO GROUP BLD: NORMAL
ALBUMIN SERPL-MCNC: 3.8 G/DL (ref 3.5–5.2)
ALBUMIN/GLOB SERPL: 1.7 G/DL
ALP SERPL-CCNC: 40 U/L (ref 39–117)
ALT SERPL W P-5'-P-CCNC: 19 U/L (ref 1–41)
ANION GAP SERPL CALCULATED.3IONS-SCNC: 8 MMOL/L (ref 5–15)
APTT PPP: 25.1 SECONDS (ref 24–31)
AST SERPL-CCNC: 16 U/L (ref 1–40)
BASOPHILS # BLD AUTO: 0 10*3/MM3 (ref 0–0.2)
BASOPHILS NFR BLD AUTO: 0.5 % (ref 0–1.5)
BILIRUB SERPL-MCNC: <0.2 MG/DL (ref 0–1.2)
BLD GP AB SCN SERPL QL: NEGATIVE
BUN SERPL-MCNC: 13 MG/DL (ref 6–20)
BUN/CREAT SERPL: 15.9 (ref 7–25)
CALCIUM SPEC-SCNC: 8.4 MG/DL (ref 8.6–10.5)
CHLORIDE SERPL-SCNC: 104 MMOL/L (ref 98–107)
CO2 SERPL-SCNC: 28 MMOL/L (ref 22–29)
CREAT SERPL-MCNC: 0.82 MG/DL (ref 0.76–1.27)
DEPRECATED RDW RBC AUTO: 49.9 FL (ref 37–54)
EGFRCR SERPLBLD CKD-EPI 2021: 114.6 ML/MIN/1.73
EOSINOPHIL # BLD AUTO: 0.2 10*3/MM3 (ref 0–0.4)
EOSINOPHIL NFR BLD AUTO: 3.8 % (ref 0.3–6.2)
ERYTHROCYTE [DISTWIDTH] IN BLOOD BY AUTOMATED COUNT: 16 % (ref 12.3–15.4)
GLOBULIN UR ELPH-MCNC: 2.2 GM/DL
GLUCOSE SERPL-MCNC: 116 MG/DL (ref 65–99)
HBA1C MFR BLD: 5.6 % (ref 4.8–5.6)
HCT VFR BLD AUTO: 43.6 % (ref 37.5–51)
HGB BLD-MCNC: 14.2 G/DL (ref 13–17.7)
HOLD SPECIMEN: NORMAL
INR PPP: 1.06 (ref 0.93–1.1)
LYMPHOCYTES # BLD AUTO: 2.3 10*3/MM3 (ref 0.7–3.1)
LYMPHOCYTES NFR BLD AUTO: 35.3 % (ref 19.6–45.3)
MCH RBC QN AUTO: 29.2 PG (ref 26.6–33)
MCHC RBC AUTO-ENTMCNC: 32.7 G/DL (ref 31.5–35.7)
MCV RBC AUTO: 89.3 FL (ref 79–97)
MONOCYTES # BLD AUTO: 0.5 10*3/MM3 (ref 0.1–0.9)
MONOCYTES NFR BLD AUTO: 7.4 % (ref 5–12)
NEUTROPHILS NFR BLD AUTO: 3.4 10*3/MM3 (ref 1.7–7)
NEUTROPHILS NFR BLD AUTO: 53 % (ref 42.7–76)
NRBC BLD AUTO-RTO: 0 /100 WBC (ref 0–0.2)
PLATELET # BLD AUTO: 202 10*3/MM3 (ref 140–450)
PMV BLD AUTO: 9.7 FL (ref 6–12)
POTASSIUM SERPL-SCNC: 4.4 MMOL/L (ref 3.5–5.2)
PROT SERPL-MCNC: 6 G/DL (ref 6–8.5)
PROTHROMBIN TIME: 11.5 SECONDS (ref 9.6–11.7)
RBC # BLD AUTO: 4.88 10*6/MM3 (ref 4.14–5.8)
RH BLD: POSITIVE
SODIUM SERPL-SCNC: 140 MMOL/L (ref 136–145)
T&S EXPIRATION DATE: NORMAL
WBC NRBC COR # BLD AUTO: 6.4 10*3/MM3 (ref 3.4–10.8)
WHOLE BLOOD HOLD COAG: NORMAL
WHOLE BLOOD HOLD COAG: NORMAL
WHOLE BLOOD HOLD SPECIMEN: NORMAL
WHOLE BLOOD HOLD SPECIMEN: NORMAL

## 2023-12-29 PROCEDURE — 70450 CT HEAD/BRAIN W/O DYE: CPT

## 2023-12-29 PROCEDURE — 85025 COMPLETE CBC W/AUTO DIFF WBC: CPT | Performed by: EMERGENCY MEDICINE

## 2023-12-29 PROCEDURE — 80061 LIPID PANEL: CPT | Performed by: STUDENT IN AN ORGANIZED HEALTH CARE EDUCATION/TRAINING PROGRAM

## 2023-12-29 PROCEDURE — 25810000003 SODIUM CHLORIDE 0.9 % SOLUTION 250 ML FLEX CONT: Performed by: EMERGENCY MEDICINE

## 2023-12-29 PROCEDURE — 36415 COLL VENOUS BLD VENIPUNCTURE: CPT

## 2023-12-29 PROCEDURE — 93005 ELECTROCARDIOGRAM TRACING: CPT | Performed by: EMERGENCY MEDICINE

## 2023-12-29 PROCEDURE — 83036 HEMOGLOBIN GLYCOSYLATED A1C: CPT | Performed by: STUDENT IN AN ORGANIZED HEALTH CARE EDUCATION/TRAINING PROGRAM

## 2023-12-29 PROCEDURE — 99285 EMERGENCY DEPT VISIT HI MDM: CPT

## 2023-12-29 PROCEDURE — 85730 THROMBOPLASTIN TIME PARTIAL: CPT | Performed by: EMERGENCY MEDICINE

## 2023-12-29 PROCEDURE — 99222 1ST HOSP IP/OBS MODERATE 55: CPT | Performed by: STUDENT IN AN ORGANIZED HEALTH CARE EDUCATION/TRAINING PROGRAM

## 2023-12-29 PROCEDURE — 80053 COMPREHEN METABOLIC PANEL: CPT | Performed by: EMERGENCY MEDICINE

## 2023-12-29 PROCEDURE — 86850 RBC ANTIBODY SCREEN: CPT | Performed by: EMERGENCY MEDICINE

## 2023-12-29 PROCEDURE — 0042T HC CT CEREBRAL PERFUSION W/WO CONTRAST: CPT

## 2023-12-29 PROCEDURE — 70498 CT ANGIOGRAPHY NECK: CPT

## 2023-12-29 PROCEDURE — 25010000002 LEVETRIRACETAM PER 10 MG: Performed by: EMERGENCY MEDICINE

## 2023-12-29 PROCEDURE — 70496 CT ANGIOGRAPHY HEAD: CPT

## 2023-12-29 PROCEDURE — 4A03X5D MEASUREMENT OF ARTERIAL FLOW, INTRACRANIAL, EXTERNAL APPROACH: ICD-10-PCS | Performed by: RADIOLOGY

## 2023-12-29 PROCEDURE — 71045 X-RAY EXAM CHEST 1 VIEW: CPT

## 2023-12-29 PROCEDURE — 86900 BLOOD TYPING SEROLOGIC ABO: CPT

## 2023-12-29 PROCEDURE — 25010000002 TENECTEPLASE PER 50 MG

## 2023-12-29 PROCEDURE — 85610 PROTHROMBIN TIME: CPT | Performed by: EMERGENCY MEDICINE

## 2023-12-29 PROCEDURE — 86900 BLOOD TYPING SEROLOGIC ABO: CPT | Performed by: EMERGENCY MEDICINE

## 2023-12-29 PROCEDURE — 86901 BLOOD TYPING SEROLOGIC RH(D): CPT | Performed by: EMERGENCY MEDICINE

## 2023-12-29 PROCEDURE — 3E03317 INTRODUCTION OF OTHER THROMBOLYTIC INTO PERIPHERAL VEIN, PERCUTANEOUS APPROACH: ICD-10-PCS | Performed by: EMERGENCY MEDICINE

## 2023-12-29 PROCEDURE — 86901 BLOOD TYPING SEROLOGIC RH(D): CPT

## 2023-12-29 PROCEDURE — 25510000001 IOPAMIDOL PER 1 ML: Performed by: EMERGENCY MEDICINE

## 2023-12-29 RX ORDER — SODIUM CHLORIDE 0.9 % (FLUSH) 0.9 %
10 SYRINGE (ML) INJECTION
Status: DISPENSED | OUTPATIENT
Start: 2023-12-29 | End: 2023-12-29

## 2023-12-29 RX ORDER — SODIUM CHLORIDE 9 MG/ML
40 INJECTION, SOLUTION INTRAVENOUS AS NEEDED
Status: DISCONTINUED | OUTPATIENT
Start: 2023-12-29 | End: 2024-01-04 | Stop reason: HOSPADM

## 2023-12-29 RX ORDER — SODIUM CHLORIDE 0.9 % (FLUSH) 0.9 %
10 SYRINGE (ML) INJECTION EVERY 12 HOURS SCHEDULED
Status: DISCONTINUED | OUTPATIENT
Start: 2023-12-29 | End: 2024-01-04 | Stop reason: HOSPADM

## 2023-12-29 RX ORDER — SODIUM CHLORIDE 0.9 % (FLUSH) 0.9 %
10 SYRINGE (ML) INJECTION AS NEEDED
Status: DISCONTINUED | OUTPATIENT
Start: 2023-12-29 | End: 2024-01-04 | Stop reason: HOSPADM

## 2023-12-29 RX ORDER — SODIUM CHLORIDE 0.9 % (FLUSH) 0.9 %
10 SYRINGE (ML) INJECTION ONCE
Status: COMPLETED | OUTPATIENT
Start: 2023-12-29 | End: 2023-12-29

## 2023-12-29 RX ORDER — ATORVASTATIN CALCIUM 40 MG/1
80 TABLET, FILM COATED ORAL NIGHTLY
Status: DISCONTINUED | OUTPATIENT
Start: 2023-12-29 | End: 2024-01-04 | Stop reason: HOSPADM

## 2023-12-29 RX ADMIN — LEVETIRACETAM 2000 MG: 100 INJECTION, SOLUTION INTRAVENOUS at 22:35

## 2023-12-29 RX ADMIN — IOPAMIDOL 150 ML: 755 INJECTION, SOLUTION INTRAVENOUS at 22:34

## 2023-12-29 RX ADMIN — Medication 22 MG: at 22:48

## 2023-12-29 RX ADMIN — Medication 10 ML: at 22:57

## 2023-12-30 ENCOUNTER — APPOINTMENT (OUTPATIENT)
Dept: GENERAL RADIOLOGY | Facility: HOSPITAL | Age: 38
DRG: 062 | End: 2023-12-30
Payer: MEDICAID

## 2023-12-30 ENCOUNTER — APPOINTMENT (OUTPATIENT)
Dept: CT IMAGING | Facility: HOSPITAL | Age: 38
DRG: 062 | End: 2023-12-30
Payer: MEDICAID

## 2023-12-30 ENCOUNTER — APPOINTMENT (OUTPATIENT)
Dept: MRI IMAGING | Facility: HOSPITAL | Age: 38
DRG: 062 | End: 2023-12-30
Payer: MEDICAID

## 2023-12-30 ENCOUNTER — APPOINTMENT (OUTPATIENT)
Dept: CARDIOLOGY | Facility: HOSPITAL | Age: 38
DRG: 062 | End: 2023-12-30
Payer: OTHER GOVERNMENT

## 2023-12-30 ENCOUNTER — APPOINTMENT (OUTPATIENT)
Dept: CT IMAGING | Facility: HOSPITAL | Age: 38
DRG: 062 | End: 2023-12-30
Payer: OTHER GOVERNMENT

## 2023-12-30 PROBLEM — I63.9 CVA (CEREBRAL VASCULAR ACCIDENT): Status: ACTIVE | Noted: 2023-12-30

## 2023-12-30 LAB
AMPHET+METHAMPHET UR QL: NEGATIVE
BARBITURATES UR QL SCN: NEGATIVE
BENZODIAZ UR QL SCN: POSITIVE
BH CV ECHO MEAS - AO MAX PG: 12 MMHG
BH CV ECHO MEAS - AO MEAN PG: 7 MMHG
BH CV ECHO MEAS - AO V2 MAX: 173 CM/SEC
BH CV ECHO MEAS - AO V2 VTI: 28.4 CM
BH CV ECHO MEAS - AVA(I,D): 2.7 CM2
BH CV ECHO MEAS - EDV(CUBED): 54.9 ML
BH CV ECHO MEAS - EDV(MOD-SP2): 73.9 ML
BH CV ECHO MEAS - EDV(MOD-SP4): 82.9 ML
BH CV ECHO MEAS - EF(MOD-BP): 68.5 %
BH CV ECHO MEAS - EF(MOD-SP2): 72.4 %
BH CV ECHO MEAS - EF(MOD-SP4): 64.9 %
BH CV ECHO MEAS - ESV(CUBED): 10.6 ML
BH CV ECHO MEAS - ESV(MOD-SP2): 20.4 ML
BH CV ECHO MEAS - ESV(MOD-SP4): 29.1 ML
BH CV ECHO MEAS - FS: 42.1 %
BH CV ECHO MEAS - IVS/LVPW: 1.11 CM
BH CV ECHO MEAS - IVSD: 1 CM
BH CV ECHO MEAS - LA DIMENSION: 3.3 CM
BH CV ECHO MEAS - LAT PEAK E' VEL: 9.7 CM/SEC
BH CV ECHO MEAS - LV DIASTOLIC VOL/BSA (35-75): 41.1 CM2
BH CV ECHO MEAS - LV MASS(C)D: 109 GRAMS
BH CV ECHO MEAS - LV MAX PG: 6.5 MMHG
BH CV ECHO MEAS - LV MEAN PG: 4 MMHG
BH CV ECHO MEAS - LV SYSTOLIC VOL/BSA (12-30): 14.4 CM2
BH CV ECHO MEAS - LV V1 MAX: 127 CM/SEC
BH CV ECHO MEAS - LV V1 VTI: 22.3 CM
BH CV ECHO MEAS - LVIDD: 3.8 CM
BH CV ECHO MEAS - LVIDS: 2.2 CM
BH CV ECHO MEAS - LVOT AREA: 3.5 CM2
BH CV ECHO MEAS - LVOT DIAM: 2.1 CM
BH CV ECHO MEAS - LVPWD: 0.9 CM
BH CV ECHO MEAS - MED PEAK E' VEL: 10 CM/SEC
BH CV ECHO MEAS - MV A MAX VEL: 104 CM/SEC
BH CV ECHO MEAS - MV DEC SLOPE: 692.5 CM/SEC2
BH CV ECHO MEAS - MV DEC TIME: 0.12 SEC
BH CV ECHO MEAS - MV E MAX VEL: 104 CM/SEC
BH CV ECHO MEAS - MV E/A: 1
BH CV ECHO MEAS - MV MAX PG: 4.8 MMHG
BH CV ECHO MEAS - MV MEAN PG: 2 MMHG
BH CV ECHO MEAS - MV P1/2T: 45.3 MSEC
BH CV ECHO MEAS - MV V2 VTI: 27.5 CM
BH CV ECHO MEAS - MVA(P1/2T): 4.9 CM2
BH CV ECHO MEAS - MVA(VTI): 2.8 CM2
BH CV ECHO MEAS - PA V2 MAX: 117 CM/SEC
BH CV ECHO MEAS - RV MAX PG: 6.2 MMHG
BH CV ECHO MEAS - RV V1 MAX: 124 CM/SEC
BH CV ECHO MEAS - RV V1 VTI: 18.6 CM
BH CV ECHO MEAS - RVDD: 2.3 CM
BH CV ECHO MEAS - SI(MOD-SP2): 26.5 ML/M2
BH CV ECHO MEAS - SI(MOD-SP4): 26.7 ML/M2
BH CV ECHO MEAS - SV(LVOT): 77.2 ML
BH CV ECHO MEAS - SV(MOD-SP2): 53.5 ML
BH CV ECHO MEAS - SV(MOD-SP4): 53.8 ML
BH CV ECHO MEAS - TAPSE (>1.6): 1.74 CM
BH CV ECHO MEASUREMENTS AVERAGE E/E' RATIO: 10.56
BH CV ECHO SHUNT ASSESSMENT PERFORMED (HIDDEN SCRIPTING): 1
BH CV XLRA - RV BASE: 3.2 CM
BH CV XLRA - RV LENGTH: 6 CM
BH CV XLRA - RV MID: 3.2 CM
BH CV XLRA - TDI S': 17.8 CM/SEC
BILIRUB UR QL STRIP: NEGATIVE
CANNABINOIDS SERPL QL: NEGATIVE
CHOLEST SERPL-MCNC: 182 MG/DL (ref 0–200)
CLARITY UR: CLEAR
COCAINE UR QL: NEGATIVE
COLOR UR: YELLOW
GLUCOSE BLDC GLUCOMTR-MCNC: 123 MG/DL (ref 70–105)
GLUCOSE UR STRIP-MCNC: NEGATIVE MG/DL
HDLC SERPL-MCNC: 47 MG/DL (ref 40–60)
HGB UR QL STRIP.AUTO: NEGATIVE
KETONES UR QL STRIP: NEGATIVE
LDLC SERPL CALC-MCNC: 121 MG/DL (ref 0–100)
LDLC/HDLC SERPL: 2.54 {RATIO}
LEUKOCYTE ESTERASE UR QL STRIP.AUTO: NEGATIVE
METHADONE UR QL SCN: NEGATIVE
NITRITE UR QL STRIP: NEGATIVE
OPIATES UR QL: NEGATIVE
OXYCODONE UR QL SCN: NEGATIVE
PH UR STRIP.AUTO: 6 [PH] (ref 5–8)
PROT UR QL STRIP: NEGATIVE
QT INTERVAL: 275 MS
QTC INTERVAL: 415 MS
SINUS: 3.5 CM
SP GR UR STRIP: 1.05 (ref 1–1.03)
TRIGL SERPL-MCNC: 77 MG/DL (ref 0–150)
TROPONIN T SERPL HS-MCNC: 8 NG/L
UROBILINOGEN UR QL STRIP: ABNORMAL
VLDLC SERPL-MCNC: 14 MG/DL (ref 5–40)

## 2023-12-30 PROCEDURE — 25010000002 LEVETRIRACETAM PER 10 MG: Performed by: INTERNAL MEDICINE

## 2023-12-30 PROCEDURE — 80307 DRUG TEST PRSMV CHEM ANLYZR: CPT | Performed by: EMERGENCY MEDICINE

## 2023-12-30 PROCEDURE — 70551 MRI BRAIN STEM W/O DYE: CPT

## 2023-12-30 PROCEDURE — 70450 CT HEAD/BRAIN W/O DYE: CPT

## 2023-12-30 PROCEDURE — 93306 TTE W/DOPPLER COMPLETE: CPT | Performed by: INTERNAL MEDICINE

## 2023-12-30 PROCEDURE — 93306 TTE W/DOPPLER COMPLETE: CPT

## 2023-12-30 PROCEDURE — 82948 REAGENT STRIP/BLOOD GLUCOSE: CPT

## 2023-12-30 PROCEDURE — 99222 1ST HOSP IP/OBS MODERATE 55: CPT | Performed by: PSYCHIATRY & NEUROLOGY

## 2023-12-30 PROCEDURE — 92523 SPEECH SOUND LANG COMPREHEN: CPT

## 2023-12-30 PROCEDURE — 84146 ASSAY OF PROLACTIN: CPT | Performed by: PSYCHIATRY & NEUROLOGY

## 2023-12-30 PROCEDURE — 36415 COLL VENOUS BLD VENIPUNCTURE: CPT | Performed by: EMERGENCY MEDICINE

## 2023-12-30 PROCEDURE — 84484 ASSAY OF TROPONIN QUANT: CPT | Performed by: EMERGENCY MEDICINE

## 2023-12-30 PROCEDURE — 25010000002 LEVETRIRACETAM PER 10 MG: Performed by: NURSE PRACTITIONER

## 2023-12-30 PROCEDURE — 25010000002 LEVETRIRACETAM PER 10 MG: Performed by: PSYCHIATRY & NEUROLOGY

## 2023-12-30 PROCEDURE — 74018 RADEX ABDOMEN 1 VIEW: CPT

## 2023-12-30 PROCEDURE — 81003 URINALYSIS AUTO W/O SCOPE: CPT | Performed by: EMERGENCY MEDICINE

## 2023-12-30 PROCEDURE — 92610 EVALUATE SWALLOWING FUNCTION: CPT

## 2023-12-30 RX ORDER — DIVALPROEX SODIUM 500 MG/1
500 TABLET, DELAYED RELEASE ORAL 2 TIMES DAILY
COMMUNITY

## 2023-12-30 RX ORDER — LEVETIRACETAM 500 MG/5ML
1500 INJECTION, SOLUTION, CONCENTRATE INTRAVENOUS EVERY 12 HOURS SCHEDULED
Status: DISCONTINUED | OUTPATIENT
Start: 2023-12-30 | End: 2024-01-04 | Stop reason: HOSPADM

## 2023-12-30 RX ORDER — MELATONIN
2000 DAILY
Status: DISCONTINUED | OUTPATIENT
Start: 2023-12-30 | End: 2024-01-04 | Stop reason: HOSPADM

## 2023-12-30 RX ORDER — OLANZAPINE 5 MG/1
5 TABLET ORAL 2 TIMES DAILY
COMMUNITY

## 2023-12-30 RX ORDER — ENOXAPARIN SODIUM 100 MG/ML
40 INJECTION SUBCUTANEOUS DAILY
Status: DISCONTINUED | OUTPATIENT
Start: 2023-12-31 | End: 2024-01-01

## 2023-12-30 RX ORDER — ASPIRIN 81 MG/1
81 TABLET ORAL DAILY
Status: DISCONTINUED | OUTPATIENT
Start: 2023-12-31 | End: 2024-01-01

## 2023-12-30 RX ORDER — LEVETIRACETAM 500 MG/5ML
500 INJECTION, SOLUTION, CONCENTRATE INTRAVENOUS ONCE
Status: COMPLETED | OUTPATIENT
Start: 2023-12-30 | End: 2023-12-30

## 2023-12-30 RX ORDER — LEVETIRACETAM 500 MG/5ML
500 INJECTION, SOLUTION, CONCENTRATE INTRAVENOUS EVERY 12 HOURS SCHEDULED
Status: DISCONTINUED | OUTPATIENT
Start: 2023-12-30 | End: 2023-12-30

## 2023-12-30 RX ORDER — LEVETIRACETAM 500 MG/5ML
1000 INJECTION, SOLUTION, CONCENTRATE INTRAVENOUS EVERY 12 HOURS SCHEDULED
Status: DISCONTINUED | OUTPATIENT
Start: 2023-12-30 | End: 2023-12-30

## 2023-12-30 RX ADMIN — Medication 10 ML: at 08:22

## 2023-12-30 RX ADMIN — LEVETIRACETAM 500 MG: 100 INJECTION, SOLUTION INTRAVENOUS at 08:22

## 2023-12-30 RX ADMIN — LEVETIRACETAM 1500 MG: 100 INJECTION, SOLUTION INTRAVENOUS at 21:09

## 2023-12-30 RX ADMIN — VALPROIC ACID 500 MG: 250 SOLUTION ORAL at 21:09

## 2023-12-30 RX ADMIN — Medication 10 ML: at 21:10

## 2023-12-30 RX ADMIN — ATORVASTATIN CALCIUM 80 MG: 40 TABLET, FILM COATED ORAL at 21:09

## 2023-12-30 RX ADMIN — LEVETIRACETAM 500 MG: 100 INJECTION, SOLUTION INTRAVENOUS at 09:50

## 2023-12-30 RX ADMIN — Medication 10 ML: at 04:53

## 2023-12-30 RX ADMIN — VALPROATE SODIUM 1000 MG: 100 INJECTION, SOLUTION INTRAVENOUS at 21:09

## 2023-12-30 NOTE — CONSULTS
[facility]   Teleneurology Note    Patient Name: Neeraj Martin  : 1985  MRN: [id number]  Primary Care Physician: Nanette Sterling APRN  Referring Site: [place]  Location of Neurologist: [place]    Subjective   Teleneurology Initial Data     Arrival Date Telestroke Site: 23     Neurologist Evaluation Date: 23 Neurologist Evaluation Time: 2246   Date Last Known Well: 23 Time Last Known Well:      History     Chief Complaint: left sided paralysis  HPI: 38 years old white male with known diagnosis of traumatic brain injury, seizures on Keppra 1 g twice daily presented to the hospital via EMS from assisted his last known normal 8 PM at that time he was seen by one of his friends who reported that he was at baseline without weakness, 10 minutes later became again and noticed him to have seizures and route he received Versed and Narcan and EMS noticed left-sided weakness, code stroke was called CT head showed old encephalomalacia's on the right and left MCA's, no acute findings, CT perfusion showed small penumbra on the left frontal, CT angio head and neck showed no large vessel occlusion.  Patient on exam has NIH score of 24 which include left side paralysis, gaze preference to the right side, left facial droop, left-sided neglect, dysarthria and altered mental status.  His presentation could be a Jonn's paralysis after seizures but giving that the patient presented within the window and stroke cannot be ruled out tnk was given at 1049 PM. I reviewed his prior neurological note from care everywhere 1 not on 2023 mentioned that at baseline he has no focal weakness.    Stroke Risk Factors/ Pertinent Data           Scoring Scales     Intracerebral Hemmorhage (ICH) Score  Age>=80: no    NIH Stroke Scale     NIHSS Performed Date: 23     Interval: baseline  1a. Level of Consciousness: 1-->Not alert, but arousable by minor stimulation to obey, answer, or respond  1b. LOC  Questions: 2-->Answers neither question correctly  1c. LOC Commands: 2-->Performs neither task correctly  2. Best Gaze: 1-->Partial gaze palsy, gaze is abnormal in one or both eyes, but forced deviation or total gaze paresis is not present  3. Visual: 2-->Complete hemianopia  4. Facial Palsy: 2-->Partial paralysis (total or near-total paralysis of lower face)  5a. Motor Arm, Left: 4-->No movement  5b. Motor Arm, Right: 0-->No drift, limb holds 90 (or 45) degrees for full 10 secs  6a. Motor Leg, Left: 4-->No movement  6b. Motor Leg, Right: 0-->No drift, leg holds 30 degree position for full 5 secs  7. Limb Ataxia: 0-->Absent  8. Sensory: 2-->Severe to total sensory loss, patient is not aware of being touched in the face, arm, and leg  9. Best Language: 0-->No aphasia, normal  10. Dysarthria: 2-->Severe dysarthria, patients speech is so slurred as to be unintelligible in the absence of or out of proportion to any dysphasia, or is mute/anarthric  11. Extinction and Inattention (formerly Neglect): 2-->Profound nash-inattention/extinction more than 1 modality  Total (NIH Stroke Scale): 24     Review of Systems     Review of Systems  All reviewed and they are negative except what mentioned in HPI  Objective   Exam     Exam performed with the help of support staff from the referring site  Neurological Exam  Lethargic, alert to self but not to place or time does not follow commands, no aphasia  Right gaze preference, left lower facial droop, left hemianopsia  Left hemiplegia  Decreased sensation on the left compared to the right  Left-sided neglect  Result Review    Results          Personal review of CNS imaging:(Official report by radiologist pending)  Imaging  CT Imaging Review: CT Imaging reviewed, POSITIVE for abnormal finding  CTA Imaging Review: CTA Imaging reviewed, NO large vessel occlusion or severe stenosis seen  CT Perfusion Review: CT perfusion reviewed, ABNORMAL  CT Perfusion Details: Small left frontal  penumbra    Thrombolytic   Thrombolytics: thrombolytic given  Date Thrombolytic Therapy Given PTA: 12/29/23  Time Thrombolytic Therapy Given PTA: 2248     Assessment & Plan   Assessment/ Plan     Assessment:  -Acute Stroke Evaluation: Suspected ACUTE ischemic stroke versus Jonn's paralysis  -Seizures      Plan:  1.  Patient received TNK for significant disabling left-sided weakness   2.  Repeat CT head 24 hours post TNK  3.  No aspirin or anticoagulation until 24-hour CT head negative for bleed  4.  Stroke labs  5.  2D echo with bubble study  6.  MRI brain without contrast  7.  Load the patient with 2 g of IV Keppra then start 1 g twice daily  8.  Use Ativan 2 mg as needed for seizures lasting more than 3 minutes  9.  Keep blood pressure less than 180/105, currently running at 144 systolic.  10.  Keep blood sugar less than 180, currently blood sugar 150    Patient is critically ill, discussed with nursing staff and ED physician.         Disposition          Medical Decision Making  Medical Data Reviewed: Data reviewed including: clinical labs, radiology and/or medical tests, Obtaining/ reviewing old medical records, Obtaining case history from another source, Independent review of CNS images  Length of visit: 60 minutes    Husam CALDERON MD, saw the patient on 12/29/23 at 2246 for an initial in-patient or emergency room telememedicine face to face consult using interactive technology for 60 minutes. The location of the patient was [place]. I was located at [place].    I have proceeded with this evaluation at the request of the referring practitioner as it is felt to be an emergency setting and no appropriate specialist is available to perform this evaluation. The originating hospital has reported that this is the correct patient and has obtained consent from the patient/surrogate to perform this telemedicine evaluation(including obtaining history, performing examination and reviewing data provided by the  patient an/or originating site of care provider)    I have introduced myself to the patient, provided my credentials, disclosed my location, and determined that, based on review of the patient's chart and discussion with the patient's primary team, telemedicine via a HIPAA compliant, real-time, face-to-face two-way, interactive audio and video platform is an appropriate and effective means of providing the service.    The patient/surrogate has a right to refuse this evaluation as they have been explained risks including potential loss of confidentiality, benefits, alternatives, and the potential need for subsequent face-to-face care. In this evaluation, we will be providing recommendations only.  The ultimate decision to follow or not to follow these recommendations will be left to the bedside treating/requesting practitioner.    The patient/surrogate has been notified that other healthcare professionals including technical person may be involved in this A/V evaluation.  All laws concerning confidentiality and patient access to medical records and copies of medical records apply to telemedicine.  The patient/surrogate has received the originating site's Health Notice of Privacy Practices.    Husam Khan MD

## 2023-12-30 NOTE — PLAN OF CARE
Goal Outcome Evaluation:  Plan of Care Reviewed With: patient, guardian, other (see comments) (Officer at bedside)        Progress: no change. Pt arrived on unit from ED. NIH score 9 upon arrival. Moving left side, however, no feeling in L upper or lower extremity. No feeling in left side of face. This finding has improved over time and now he now has feeling in face, upper and lower extremity. Is able to recall full name and , does not consistently get the year right and unable to consistently tell us where he is. VSS. Labs as ordered. Meds as ordered. Makes needs known. Remains NPO related to slurred speech and facial droop. 24 hr follow-up CT should be completed at 6407.

## 2023-12-30 NOTE — PLAN OF CARE
Goal Outcome Evaluation:            Patient alert and oriented in AM. At around 1400 became agitated and sweating profusely. Officer at bedside hand cuffed patient to bedside rails at that time. Patient remains calm and cooperative for RN, just impulsive and trying to get out of bed. See flowsheet for NIH. MRI attempted at this time.

## 2023-12-30 NOTE — PLAN OF CARE
"Goal Outcome Evaluation:    SLP orders received and appreciated per CVA protocol.     Failed swallow screen d/t L labial droop. Pt with hx TBI, trach, PEG in 2018 no ongoing difficulties. Upon eval, ongoing L labial droop, dark lingual secretions, question injury during seizure. Pt requires some feeding assist with utensils, can self feed straw sips and finger foods. Despite weakness, no anterior loss. Mastication timely despite missing dentition. L sided buccal pocketing with approximately 50% regular solids, pt can clear with cued lingual sweep and thin wash. No overt s/s aspiration, c/o globus or odynophagia throughout.     Recommend: regular and thin liquids. Medications: with thin liquids. Compensations: check L for buccal pocketing, lingual sweep, alternate liquids and solids, upright for all PO, small bites/sips.     Auditory comprehension appear appropriate with following 1, 2 step commands, yes/no wh- questions. Suspect verbal expression intact with: naming, syntax, conversation, description WFL with short phrases. Jasper Dysarthria Assessment tool portions administered. Oral mech significant for L droop, reduced range of motion. Respiration: short breath phrases, audible inspiration. Phonation: mild hoarseness, monopitch, monoloudness, reduced loudness, MTT 4.2 seconds - reduced for age and gender. Resonance: mild hyponasality. Prosody: able to vary. Articulation:imprecise, short phrases, slow rate. AMR: p: 12; t: 10; k: 7. SMR: 6 rated as \"fair.\"     Clinical impression: mild to moderate flaccid dysarthria.     Will follow x1 for re-eval tolerance ensure pocketing resolved, use of compensatory strategies. Will follow for motor speech tx while in house, pending improvement may require ongoing ST at next level of care.   "

## 2023-12-30 NOTE — CONSULTS
Primary Care Provider: Nanette Sterling APRN     Consult requested by:  Dr. Morgan    Reason for Consultation: Neurological evaluation for left sided paralysis    History of present illness: Neeraj Martin is a 38 y.o. male with hx of known traumatic brain injury, and seizures on keppra 1000 mg BID, presenting for acute left sided weakness.  Pt was brought to ER from [facility] and last known normal 12/23/23 at 8 PM.  Pt had witnessed seizure en route for which he received Versed and Narcan.  There was no improvement, and pt developed left sided weakness.  CODE STOKE was activated in ER.  NIHSS 24 CT head showed left frontal, left temporal and right posterior parietal regions of encephalomalacia with post surgical changes of right craniectomy.  CTA h/n neg for LVO.    Given profound neuro deficits and short window for lytic tx, pt received IV TNK, and was subsequently admitted to ICU.       Review of Systems   As per HPI    PATIENT HISTORY:  Past Medical History:   Diagnosis Date    Epilepsy     Headache     Hepatitis C virus infection 4/26/2021    Memory dysfunction following head trauma     Methicillin resistant Staphylococcus aureus infection 9/30/2018    Seizures     Stroke    ,   Past Surgical History:   Procedure Laterality Date    BRAIN SURGERY Right 2020    HERNIA REPAIR     ,   Family History   Family history unknown: Yes   ,   Social History     Tobacco Use    Smoking status: Every Day     Packs/day: 1.00     Years: 15.00     Additional pack years: 0.00     Total pack years: 15.00     Types: Cigarettes     Passive exposure: Current    Smokeless tobacco: Current     Types: Chew   Vaping Use    Vaping Use: Every day    Substances: Nicotine    Devices: Disposable   Substance Use Topics    Alcohol use: Yes     Comment: 1-2 drinks p/ wk    Drug use: Not Currently     Comment: 2022   ,   Prior to Admission medications    Medication Sig Start Date End Date Taking? Authorizing Provider   Cariprazine HCl (Vraylar) 4.5  MG capsule capsule Take 1 capsule by mouth Daily. 11/16/23  Yes Fiona Grace APRN   levETIRAcetam (KEPPRA) 1000 MG tablet Take 1 tablet by mouth twice daily 12/25/23  Yes Nanette Sterling APRN   Narcan 4 MG/0.1ML nasal spray 2 (Two) Times a Day As Needed. 4/6/21   ProviderFouzia MD    Allergies:  Patient has no known allergies.    Current Facility-Administered Medications   Medication Dose Route Frequency Provider Last Rate Last Admin    [START ON 12/31/2023] aspirin EC tablet 81 mg  81 mg Oral Daily Tavon Morgan MD        atorvastatin (LIPITOR) tablet 80 mg  80 mg Oral Nightly Husam Khan MD        [START ON 12/31/2023] Enoxaparin Sodium (LOVENOX) syringe 40 mg  40 mg Subcutaneous Daily Tavon Morgan MD        levETIRAcetam (KEPPRA) injection 1,000 mg  1,000 mg Intravenous Q12H Tavon Morgan MD        sodium chloride 0.9 % flush 10 mL  10 mL Intravenous PRN Victoriano Cruz MD        sodium chloride 0.9 % flush 10 mL  10 mL Intravenous Q12H Husam Khan MD   10 mL at 12/30/23 0822    sodium chloride 0.9 % flush 10 mL  10 mL Intravenous PRN Husam Khan MD        sodium chloride 0.9 % infusion 40 mL  40 mL Intravenous PRN Husam Khan MD            ________________________________________________________        OBJECTIVE:    VITAL SIGNS:   Temp:  [98.3 °F (36.8 °C)-98.9 °F (37.2 °C)] 98.9 °F (37.2 °C)  Heart Rate:  [] 85  Resp:  [16-27] 27  BP: ()/(60-97) 120/74        General Exam:     Constitutional: The patient is in no apparent distress, bright awake and alert.    Head: Normocephalic, atraumatic.   Neck: No nuchal rigidity, ROM normal, supple  Resp: Breathing unlabored, breath sounds are normal  Cardiac: Regular rate and rhythm.   Extremities/MSK:  No clubbing, cyanosis or edema.  Psychiatric: Mood/affect normal, judgement normal, appropriate     Neuro exam:    Cognition:   Alert and oriented.  Fund of knowledge  preserved.  Concentration and attention normal.   Language normal with normal comprehension, fluent speech     Cranial nerves:     II - Pupils bilaterally briskly reactive to light  No new visual field deficits;  III,IV,VI: Intact  V: Normal facial sensations  VII: Intact   VIII: No new hearing changes  IX, X, XI: Normal gag and shoulder shrug;  XII: Tongue is in the midline.     Sensory:  Intact to light touch in all extremities. Neglects left side to DSS     Motor: Strength 5/5 bilaterally upper and lower extremities. No involuntary movements present. Normal tone and bulk.  Reflexes: Plantars are flexor.     Cerebellar: FTN intact     Gait and balance: Deferred       ________________________________________________________   RESULTS REVIEW:      LABS:      Lab 12/29/23 2210   WBC 6.40   HEMOGLOBIN 14.2   HEMATOCRIT 43.6   PLATELETS 202   NEUTROS ABS 3.40   LYMPHS ABS 2.30   MONOS ABS 0.50   EOS ABS 0.20   MCV 89.3   PROTIME 11.5   APTT 25.1         Lab 12/29/23 2323 12/29/23 2236   SODIUM  --  140   POTASSIUM  --  4.4   CHLORIDE  --  104   CO2  --  28.0   ANION GAP  --  8.0   BUN  --  13   CREATININE  --  0.82   EGFR  --  114.6   GLUCOSE  --  116*   CALCIUM  --  8.4*   HEMOGLOBIN A1C 5.60  --          Lab 12/29/23 2236   TOTAL PROTEIN 6.0   ALBUMIN 3.8   GLOBULIN 2.2   ALT (SGPT) 19   AST (SGOT) 16   BILIRUBIN <0.2   ALK PHOS 40         Lab 12/30/23  0649 12/29/23 2210   HSTROP T 8  --    PROTIME  --  11.5   INR  --  1.06         Lab 12/29/23 2323   CHOLESTEROL 182   LDL CHOL 121*   HDL CHOL 47   TRIGLYCERIDES 77         Lab 12/29/23 2236   ABO TYPING O   RH TYPING Positive   ANTIBODY SCREEN Negative         UA          12/30/2023    04:58   Urinalysis   Specific [place], UA 1.054    Ketones, UA Negative    Blood, UA Negative    Leukocytes, UA Negative    Nitrite, UA Negative        Lab Results   Component Value Date    TSH 0.946 08/29/2023     (H) 12/29/2023    HGBA1C 5.60 12/29/2023       IMAGING  STUDIES:  XR Chest 1 View    Result Date: 12/29/2023  Impression: Hypoinflated lungs with left basilar airspace disease which may relate to atelectasis, aspiration or pneumonia difficult to exclude. Electronically Signed: Johnson Campbell MD  12/29/2023 11:23 PM EST  Workstation ID: [id number]    CT Angiogram Head w AI Analysis of LVO    Result Date: 12/29/2023  Impression: 1. No central intracranial large vessel occlusion. 2. Patent bilateral carotid arteries. 3. Patent bilateral vertebral arteries. 4. Question of short segment moderate stenosis of left MCA M2 branch. Intracranial venous contamination and suboptimal contrast opacification limits assessment. 5. Areas of suspected chronic encephalomalacia at right posterior parietal lobe, left frontal lobe and left temporal lobe. Electronically Signed: Johnson Campbell MD  12/29/2023 11:08 PM EST  Workstation ID: [id number]    CT Angiogram Neck    Result Date: 12/29/2023  Impression: 1. No central intracranial large vessel occlusion. 2. Patent bilateral carotid arteries. 3. Patent bilateral vertebral arteries. 4. Question of short segment moderate stenosis of left MCA M2 branch. Intracranial venous contamination and suboptimal contrast opacification limits assessment. 5. Areas of suspected chronic encephalomalacia at right posterior parietal lobe, left frontal lobe and left temporal lobe. Electronically Signed: Johnson Campbell MD  12/29/2023 11:08 PM EST  Workstation ID: [id number]    CT CEREBRAL PERFUSION WITH & WITHOUT CONTRAST    Result Date: 12/29/2023  Impression: 1. No core infarct. 2. Area of prolonged transit time in the left frontal lobe which could be artifactual due to volume averaging with the calvarium given positioning. This may also relate to an area of chronic encephalomalacia secondary to remote infarct seen on noncontrast CT. Consider brain MR follow-up if concern for superimposed ischemia at this site. Electronically Signed: Johnson Campbell MD  12/29/2023 10:45 PM EST   Workstation ID: [id number]    CT Head Without Contrast Stroke Protocol    Result Date: 12/29/2023  Impression: 1. No intracranial hemorrhage. 2. Areas of encephalomalacia involving right posterior parietal lobe, left frontal lobe and left temporal lobe likely sequela of remote infarcts. 3. Postsurgical changes of right sided craniectomy. Electronically Signed: Johnson Campbell MD  12/29/2023 10:27 PM EST  Workstation ID: [id number]     I reviewed the patient's new clinical results.    ________________________________________________________     PROBLEM LIST:    CVA (cerebral vascular accident)            ASSESSMENT/PLAN:    harshal Martin is a 38 y.o. male with hx of known traumatic brain injury, and seizures on keppra 1000 mg BID, presenting for acute left sided weakness.  Pt was brought to ER from [facility] and last known normal 12/23/23 at 8 PM.  Pt had witnessed seizure en route for which he received Versed and Narcan.  Pt later developed left sided weakness.   CT head -ve, save old areas of encephalomalacia (right post parietal, left frontal, left temporal) and right hemicraniectomy.       IMPRESSION:   Stroke vs. Stroke Mimic with Jonn's Paralysis    TBI with multiple cortical areas of encephalomalacia    H/o localization related seizures 2ndy #2    1.   NPO until pt passes swallow eval.   2.   MRI brain w/o  3.   Max RF modification: Blood pressure should be less than 180/105, HbA1c less than 6.5, LDL less than 70 - start high intensity STATIN  4.   Inpt telemetry  5.  TTE with bubble  6.   Fasting lipids, HbA1c, TSH  7.   PT/OT, and fall precautions    8.   Start bASA after 24 hrs  9.   Increase keppra 1500 mg BID  10.   VTE ppx - SCD's x 24 hrs        I discussed the patient's findings and my recommendations with patient and answered all his questions.    Jacinda Canales MD  12/30/23  11:13 EST

## 2023-12-30 NOTE — ED PROVIDER NOTES
"Subjective   History of Present Illness  38-year-old male presents for multiple seizures, altered mental status.  Last known normal per officer was 8 PM.  Reportedly has history of seizures.  Med list includes Keppra, olanzapine, Divalproex.  History limited by patient status.  Review of Systems  Positive for seizure activity.  Past Medical History:   Diagnosis Date    Epilepsy     Headache     Hepatitis C virus infection 4/26/2021    Memory dysfunction following head trauma     Methicillin resistant Staphylococcus aureus infection 9/30/2018    Seizures     Stroke        No Known Allergies    Past Surgical History:   Procedure Laterality Date    BRAIN SURGERY Right 2020    HERNIA REPAIR         Family History   Family history unknown: Yes       Social History     Socioeconomic History    Marital status:    Tobacco Use    Smoking status: Every Day     Packs/day: 1.00     Years: 15.00     Additional pack years: 0.00     Total pack years: 15.00     Types: Cigarettes     Passive exposure: Current    Smokeless tobacco: Current     Types: Chew   Vaping Use    Vaping Use: Every day    Substances: Nicotine    Devices: Disposable   Substance and Sexual Activity    Alcohol use: Yes     Comment: 1-2 drinks p/ wk    Drug use: Not Currently     Comment: 2022    Sexual activity: Yes           Objective   Physical Exam  Significant distress, tachycardic rate and regular rhythm, shallow diminished breath sounds bilaterally, will follow some basic commands with his right arm and leg, has bilateral nystagmus, flaccid paralysis on the left, not crossing midline with left eyes with extinction to that side.    Procedures           ED Course      /87   Pulse 83   Temp 98.3 °F (36.8 °C) (Rectal)   Resp 18   Ht 175.3 cm (69\")   Wt 86.2 kg (190 lb)   SpO2 97%   BMI 28.06 kg/m²   Labs Reviewed   COMPREHENSIVE METABOLIC PANEL - Abnormal; Notable for the following components:       Result Value    Glucose 116 (*)     " Calcium 8.4 (*)     All other components within normal limits    Narrative:     GFR Normal >60  Chronic Kidney Disease <60  Kidney Failure <15     CBC WITH AUTO DIFFERENTIAL - Abnormal; Notable for the following components:    RDW 16.0 (*)     All other components within normal limits   LIPID PANEL - Abnormal; Notable for the following components:    LDL Cholesterol  121 (*)     All other components within normal limits    Narrative:     Cholesterol Reference Ranges  (U.S. Department of Health and Human Services ATP III Classifications)    Desirable          <200 mg/dL  Borderline High    200-239 mg/dL  High Risk          >240 mg/dL      Triglyceride Reference Ranges  (U.S. Department of Health and Human Services ATP III Classifications)    Normal           <150 mg/dL  Borderline High  150-199 mg/dL  High             200-499 mg/dL  Very High        >500 mg/dL    HDL Reference Ranges  (U.S. Department of Health and Human Services ATP III Classifications)    Low     <40 mg/dl (major risk factor for CHD)  High    >60 mg/dl ('negative' risk factor for CHD)        LDL Reference Ranges  (U.S. Department of Health and Human Services ATP III Classifications)    Optimal          <100 mg/dL  Near Optimal     100-129 mg/dL  Borderline High  130-159 mg/dL  High             160-189 mg/dL  Very High        >189 mg/dL   PROTIME-INR - Normal   APTT - Normal   HEMOGLOBIN A1C - Normal   RAINBOW DRAW    Narrative:     The following orders were created for panel order [place] Draw.  Procedure                               Abnormality         Status                     ---------                               -----------         ------                     Green Top (Gel)[[id number]]                                  Final result               Lavender Top[[id number]]                                     Final result               Gold Top - SST[[id number]]                                                              Light Blue Top[[id number]]                                    Final result                 Please view results for these tests on the individual orders.   RAINBOW DRAW    Narrative:     The following orders were created for panel order [place] Draw.  Procedure                               Abnormality         Status                     ---------                               -----------         ------                     Green Top (Gel)[[id number]]                                  Final result               Lavender Top[[id number]]                                     Final result               Gold Top - SST[[id number]]                                   Final result               Light Blue Top[[id number]]                                   Final result                 Please view results for these tests on the individual orders.   URINE DRUG SCREEN   URINALYSIS W/ MICROSCOPIC IF INDICATED (NO CULTURE)   SINGLE HSTROPONIN T   POCT GLUCOSE FINGERSTICK   POCT GLUCOSE FINGERSTICK   POCT GLUCOSE FINGERSTICK   POCT GLUCOSE FINGERSTICK   POCT GLUCOSE FINGERSTICK   POCT GLUCOSE FINGERSTICK   TYPE AND SCREEN   GREEN TOP   LAVENDER TOP   LIGHT BLUE TOP   CBC AND DIFFERENTIAL    Narrative:     The following orders were created for panel order CBC & Differential.  Procedure                               Abnormality         Status                     ---------                               -----------         ------                     CBC Auto Differential[[id number]]        Abnormal            Final result                 Please view results for these tests on the individual orders.   GREEN TOP   LAVENDER TOP   GOLD TOP - SST   LIGHT BLUE TOP     Medications   sodium chloride 0.9 % flush 10 mL (has no administration in time range)   sodium chloride 0.9 % flush 10 mL (has no administration in time range)     Followed by   tenecteplase for stroke (TNKASE) injection 22 mg (22 mg Intravenous Given 12/29/23 5913)     Followed by   sodium chloride 0.9 % flush 10 mL  (10 mL Intravenous Given 12/29/23 2257)   sodium chloride 0.9 % flush 10 mL (has no administration in time range)   sodium chloride 0.9 % flush 10 mL (has no administration in time range)   sodium chloride 0.9 % infusion 40 mL (has no administration in time range)   atorvastatin (LIPITOR) tablet 80 mg (has no administration in time range)   levETIRAcetam (KEPPRA) 2,000 mg in sodium chloride 0.9 % 250 mL IVPB (2,000 mg Intravenous Given 12/29/23 2235)   iopamidol (ISOVUE-370) 76 % injection 150 mL (150 mL Intravenous Given 12/29/23 2234)     XR Chest 1 View    Result Date: 12/29/2023  Impression: Hypoinflated lungs with left basilar airspace disease which may relate to atelectasis, aspiration or pneumonia difficult to exclude. Electronically Signed: Johnson Campbell MD  12/29/2023 11:23 PM EST  Workstation ID: [id number]    CT Angiogram Head w AI Analysis of LVO    Result Date: 12/29/2023  Impression: 1. No central intracranial large vessel occlusion. 2. Patent bilateral carotid arteries. 3. Patent bilateral vertebral arteries. 4. Question of short segment moderate stenosis of left MCA M2 branch. Intracranial venous contamination and suboptimal contrast opacification limits assessment. 5. Areas of suspected chronic encephalomalacia at right posterior parietal lobe, left frontal lobe and left temporal lobe. Electronically Signed: Johnson Campbell MD  12/29/2023 11:08 PM EST  Workstation ID: [id number]    CT Angiogram Neck    Result Date: 12/29/2023  Impression: 1. No central intracranial large vessel occlusion. 2. Patent bilateral carotid arteries. 3. Patent bilateral vertebral arteries. 4. Question of short segment moderate stenosis of left MCA M2 branch. Intracranial venous contamination and suboptimal contrast opacification limits assessment. 5. Areas of suspected chronic encephalomalacia at right posterior parietal lobe, left frontal lobe and left temporal lobe. Electronically Signed: Johnson Campbell MD  12/29/2023 11:08 PM EST   Workstation ID: [id number]    CT CEREBRAL PERFUSION WITH & WITHOUT CONTRAST    Result Date: 12/29/2023  Impression: 1. No core infarct. 2. Area of prolonged transit time in the left frontal lobe which could be artifactual due to volume averaging with the calvarium given positioning. This may also relate to an area of chronic encephalomalacia secondary to remote infarct seen on noncontrast CT. Consider brain MR follow-up if concern for superimposed ischemia at this site. Electronically Signed: Johnson Campbell MD  12/29/2023 10:45 PM EST  Workstation ID: [id number]    CT Head Without Contrast Stroke Protocol    Result Date: 12/29/2023  Impression: 1. No intracranial hemorrhage. 2. Areas of encephalomalacia involving right posterior parietal lobe, left frontal lobe and left temporal lobe likely sequela of remote infarcts. 3. Postsurgical changes of right sided craniectomy. Electronically Signed: Johnson Campbell MD  12/29/2023 10:27 PM EST  Workstation ID: [id number]                       Total (NIH Stroke Scale): 24                  Medical Decision Making  Problems Addressed:  Acute left-sided weakness: complicated acute illness or injury  Aphasia: complicated acute illness or injury  Seizure: complicated acute illness or injury    Amount and/or Complexity of Data Reviewed  Labs: ordered.  Radiology: ordered.  ECG/medicine tests: ordered.    Risk  Prescription drug management.  Decision regarding hospitalization.      Critical Care Time     The high probability of sudden, clinically significant deterioration in the patient's condition required the highest level of my preparedness to intervene urgently.  The services I provided to this patient were to treat and/or prevent clinically significant deterioration that could result in: Neurologic collapse and death. Services included the following: chart data review, reviewing nurses notes and/or old charts, documentation time, consultant collaboration regarding findings and treatment  options, vital sign assessments and ordering, interpreting and reviewing diagnostic studies/lab tests.  Aggregate critical care time was 41 minutes, which includes only time during which I was engaged in work directly related to the patient's care, as described above, whether at the bedside or elsewhere in the Emergency Department. It did not include time spent performing other reported procedures or the services of residents, students, nurses or physician assistants.    EKG interpretation: 10:31 PM, rate 136, sinus tachycardia, normal intervals, normal axis, nonspecific likely rate related ST changes.    My interpretation of CT head is negative for acute intracranial hemorrhage.  See above radiology interpretation.      Patient initially not responsive.  Then after examination began following commands with right upper extremity and right lower extremity.  Still not speaking.  Having bilateral nystagmus.    Code stroke called.  Teleneurology advising TNK.  This was ordered.    Patient beginning to have movement on left side at 12:15 AM.  Speech improving.  Will admit to ICU.  Final diagnoses:   Seizure   Aphasia   Acute left-sided weakness       ED Disposition  ED Disposition       ED Disposition   Decision to Admit    Condition   --    Comment   Level of Care: Critical Care [6]   Admitting Physician: KALYN BURDEN [[id number]]   Attending Physician: KALYN BURDEN [[id number]]                 No follow-up provider specified.       Medication List      No changes were made to your prescriptions during this visit.            Victoriano Cruz MD  12/30/23 0035

## 2023-12-30 NOTE — THERAPY EVALUATION
Acute Care - Speech Language Pathology   Swallow Initial Evaluation  Jimmy     Patient Name: Neeraj Martin  : 1985  MRN: [id number]  Today's Date: 2023               Admit Date: 2023    Visit Dx:     ICD-10-CM ICD-9-CM   1. Seizure  R56.9 780.39   2. Aphasia  R47.01 784.3   3. Acute left-sided weakness  R53.1 728.87     Patient Active Problem List   Diagnosis    Hepatitis C virus infection    Methicillin resistant Staphylococcus aureus infection    CVA (cerebral vascular accident)     Past Medical History:   Diagnosis Date    Epilepsy     Headache     Hepatitis C virus infection 2021    Memory dysfunction following head trauma     Methicillin resistant Staphylococcus aureus infection 2018    Seizures     Stroke      Past Surgical History:   Procedure Laterality Date    BRAIN SURGERY Right 2020    HERNIA REPAIR         SLP Recommendation and Plan  SLP orders received and appreciated per CVA protocol.      Recommend: regular and thin liquids. Medications: with thin liquids. Compensations: check L for buccal pocketing, lingual sweep, alternate liquids and solids, upright for all PO, small bites/sips.     Will follow x1 for re-eval tolerance ensure pocketing resolved, use of compensatory strategies. Will follow for motor speech tx while in house, pending improvement may require ongoing ST at next level of care.           SWALLOW EVALUATION (last 72 hours)       SLP Adult Swallow Evaluation       Row Name 23 1028       Rehab Evaluation    Document Type evaluation  -AB    Subjective Information complains of  thirst  -AB    Patient Observations alert;cooperative;agree to therapy  -AB    Patient/Family/Caregiver Comments/Observations seen in 2314, CO at bedside  -AB    Patient Effort good  -AB    Symptoms Noted During/After Treatment none  -AB    Symptoms Noted, Comment SWALLOW  -AB       General Information    Patient Profile Reviewed yes  -AB    Pertinent History Of Current Problem 38  years old white male with known diagnosis of traumatic brain injury, seizures on Keppra 1 g twice daily presented to the hospital via EMS from senior care his last known normal 8 PM at that time he was seen by one of his friends who reported that he was at baseline without weakness, 10 minutes later became again and noticed him to have seizures and route he received Versed and Narcan and EMS noticed left-sided weakness, code stroke was called CT head showed old encephalomalacia's on the right and left MCA's, no acute findings, CT perfusion showed small penumbra on the left frontal, CT angio head and neck showed no large vessel occlusion.  Patient on exam has NIH score of 24 which include left side paralysis, gaze preference to the right side, left facial droop, left-sided neglect, dysarthria and altered mental status.  His presentation could be a Jonn's paralysis after seizures but giving that the patient presented within the window and stroke cannot be ruled out tnk was given at 1049 PM. I reviewed his prior neurological note from care everywhere 1 not on September 2023 mentioned that at baseline he has no focal weakness.  -AB    Current Method of Nutrition NPO  -AB    Precautions/Limitations, Vision WFL  -AB    Precautions/Limitations, Hearing WFL  -AB    Prior Level of Function-Communication --  hx TBI, uncertain re: ongoing cognitive impairment  -AB    Prior Level of Function-Swallowing regular textures;thin liquids  -AB    Plans/Goals Discussed with patient;agreed upon  -AB    Barriers to Rehab none identified  -AB    Patient's Goals for Discharge return to regular diet  -AB       Oral Motor Structure and Function    Oral Lesions or Structural Abnormalities and/or variants dried dark lingual secretions, question residue versus dried blood  -AB    Dentition Assessment natural, present and adequate;missing teeth  -AB    Secretion Management WNL/WFL  -AB    Mucosal Quality dry  -AB    Gag Response --  did not assess   -AB    Volitional Swallow WFL  -AB    Volitional Cough WFL  -AB       Oral Musculature and Cranial Nerve Assessment    Oral Motor General Assessment oral labial or buccal impairment  -AB    Oral Labial or Buccal Impairment, Detail, Cranial Nerve VII (Facial): left labial droop  -AB       General Eating/Swallowing Observations    Respiratory Support Currently in Use room air  -AB    Eating/Swallowing Skills self-fed;fed by SLP  -AB    Positioning During Eating upright 90 degree  -AB    Utensils Used spoon;cup;straw  -AB    Consistencies Trialed ice chips;thin liquids;pureed;mixed consistency;chopped;regular textures  -AB       Clinical Swallow Eval    Clinical Swallow Evaluation Summary Failed swallow screen d/t L labial droop. Pt with hx TBI, trach, PEG in 2018 no ongoing difficulties. Upon eval, ongoing L labial droop, dark lingual secretions, question injury during seizure. Pt requires some feeding assist with utensils, can self feed straw sips and finger foods. Despite weakness, no anterior loss. Mastication timely despite missing dentition. L sided buccal pocketing with approximately 50% regular solids, pt can clear with cued lingual sweep and thin wash. No overt s/s aspiration, c/o globus or odynophagia throughout.      Recommend: regular and thin liquids. Medications: with thin liquids. Compensations: check L for buccal pocketing, lingual sweep, alternate liquids and solids, upright for all PO, small bites/sips.  -AB       SLP Evaluation Clinical Impression    SLP Swallowing Diagnosis mild;oral dysphagia  -AB    Functional Impact risk of aspiration/pneumonia  -AB    Rehab Potential/Prognosis, Swallowing good, to achieve stated therapy goals  -AB    Swallow Criteria for Skilled Therapeutic Interventions Met demonstrates skilled criteria  -AB       SLP Treatment Clinical Impressions    Care Plan Review evaluation/treatment results reviewed;care plan/treatment goals reviewed;risks/benefits  reviewed;current/potential barriers reviewed;patient/other agree to care plan  -AB       Recommendations    Therapy Frequency (Swallow) PRN  -AB    Predicted Duration Therapy Intervention (Days) until discharge  -AB    SLP Diet Recommendation regular textures;thin liquids  -AB    Recommended Diagnostics reassess via clinical swallow evaluation  -AB    Recommended Precautions and Strategies upright posture during/after eating;small bites of food and sips of liquid;check mouth frequently for oral residue/pocketing;alternate between small bites of food and sips of liquid  -AB    Oral Care Recommendations Oral Care before breakfast, after meals and PRN  -AB    SLP Rec. for Method of Medication Administration with thin liquids  -AB    Monitor for Signs of Aspiration yes;notify SLP if any concerns  -AB    Anticipated Discharge Disposition (SLP) unknown  -AB       Swallow Goals (SLP)    Swallow LTGs Patient will demonstrate functional swallow for  -AB       (LTG) Patient will demonstrate functional swallow for    Diet Texture (Demonstrate functional swallow) regular textures  -AB    Liquid viscosity (Demonstrate functional swallow) thin liquids  -AB    [place] (Demonstrate functional swallow) with use of compensatory strategies  -AB    Time Frame (Demonstrate functional swallow) by discharge  -AB    Progress/Outcomes (Demonstrate functional swallow) new goal  -AB              User Key  (r) = Recorded By, (t) = Taken By, (c) = Cosigned By      Initials Name Effective Dates    AB Rachael Forrest, MS CCC-SLP 12/27/22 -                     EDUCATION  The patient has been educated in the following areas:   Dysphagia (Swallowing Impairment).        SLP GOALS       Row Name 12/30/23 1028       (LTG) Patient will demonstrate functional swallow for    Diet Texture (Demonstrate functional swallow) regular textures  -AB    Liquid viscosity (Demonstrate functional swallow) thin liquids  -AB    [place] (Demonstrate  functional swallow) with use of compensatory strategies  -AB    Time Frame (Demonstrate functional swallow) by discharge  -AB    Progress/Outcomes (Demonstrate functional swallow) new goal  -AB                 User Key  (r) = Recorded By, (t) = Taken By, (c) = Cosigned By      Initials Name Provider Type    Rachael Mauro, MS CCC-SLP Speech and Language Pathologist                       Time Calculation:       Therapy Charges for Today       Code Description Service Date Service Provider Modifiers Qty    05369968466 HC ST EVAL ORAL PHARYNG SWALLOW 3 12/30/2023 Rachael Forrest, MS CCC-SLP GN 1    [id number] HC ST EVAL SPEECH AND PROD W LANG  3 12/30/2023 Rachael Forrest, MS CCC-SLP GN 1                 Rachael Forrest MS CCC-SLP  12/30/2023

## 2023-12-30 NOTE — THERAPY EVALUATION
Acute Care - Speech Language Pathology Initial Evaluation   Jimmy     Patient Name: Neeraj Martin  : 1985  MRN: [id number]  Today's Date: 2023               Admit Date: 2023     Visit Dx:    ICD-10-CM ICD-9-CM   1. Seizure  R56.9 780.39   2. Aphasia  R47.01 784.3   3. Acute left-sided weakness  R53.1 728.87     Patient Active Problem List   Diagnosis    Hepatitis C virus infection    Methicillin resistant Staphylococcus aureus infection    CVA (cerebral vascular accident)     Past Medical History:   Diagnosis Date    Epilepsy     Headache     Hepatitis C virus infection 2021    Memory dysfunction following head trauma     Methicillin resistant Staphylococcus aureus infection 2018    Seizures     Stroke      Past Surgical History:   Procedure Laterality Date    BRAIN SURGERY Right 2020    HERNIA REPAIR         SLP Recommendation and Plan  SLP orders received and appreciated per CVA protocol.      Clinical impression: mild to moderate flaccid dysarthria.      Will follow x1 for re-eval tolerance ensure pocketing resolved, use of compensatory strategies. Will follow for motor speech tx while in house, pending improvement may require ongoing ST at next level of care.         SLP EVALUATION (last 72 hours)       SLP SLC Evaluation       Row Name 23 1000       Communication Assessment/Intervention    Document Type evaluation  -AB    Subjective Information complains of  thirst  -AB    Patient Observations alert;cooperative;agree to therapy  -AB    Patient/Family/Caregiver Comments/Observations seen in 2314, CO at bedside  -AB    Patient Effort good  -AB    Symptoms Noted During/After Treatment none  -AB    Symptoms Noted, Comment SPEECH  -AB       General Information    Patient Profile Reviewed yes  -AB    Pertinent History Of Current Problem 38 years old white male with known diagnosis of traumatic brain injury, seizures on Keppra 1 g twice daily presented to the hospital via EMS from  snf his last known normal 8 PM at that time he was seen by one of his friends who reported that he was at baseline without weakness, 10 minutes later became again and noticed him to have seizures and route he received Versed and Narcan and EMS noticed left-sided weakness, code stroke was called CT head showed old encephalomalacia's on the right and left MCA's, no acute findings, CT perfusion showed small penumbra on the left frontal, CT angio head and neck showed no large vessel occlusion.  Patient on exam has NIH score of 24 which include left side paralysis, gaze preference to the right side, left facial droop, left-sided neglect, dysarthria and altered mental status.  His presentation could be a Jonn's paralysis after seizures but giving that the patient presented within the window and stroke cannot be ruled out tnk was given at 1049 PM. I reviewed his prior neurological note from care everywhere 1 not on September 2023 mentioned that at baseline he has no focal weakness.  -AB    Precautions/Limitations, Vision WFL  -AB    Precautions/Limitations, Hearing WFL  -AB    Prior Level of Function-Communication other (see comments)  hx TBI, unable to elaborate if prior cog impairment  -AB    Plans/Goals Discussed with patient;agreed upon  -AB    Barriers to Rehab none identified  -AB    Patient's Goals for Discharge patient did not state  -AB       Comprehension Assessment/Intervention    Comprehension Assessment/Intervention Auditory Comprehension  -AB       Auditory Comprehension Assessment/Intervention    Auditory Comprehension (Communication) WFL  -AB       Expression Assessment/Intervention    Expression Assessment/Intervention verbal expression  -AB       Verbal Expression Assessment/Intervention    Verbal Expression WFL  -AB       Oral Motor Structure and Function    Oral Motor Structure and Function mild impairment;moderate impairment  -AB    Oral Lesions or Structural Abnormalities and/or variants dried dark  "lingual secretions, question residue versus dried blood  -AB    Dentition Assessment natural, present and adequate;missing teeth  -AB    Mucosal Quality dry  -AB       Oral Musculature and Cranial Nerve Assessment    Oral Motor General Assessment oral labial or buccal impairment  -AB    Oral Labial or Buccal Impairment, Detail, Cranial Nerve VII (Facial): left labial droop  -AB       Motor Speech Assessment/Intervention    Motor Speech Function mild impairment;moderate impairment  -AB    Motor Speech, Comment Auditory comprehension appear appropriate with following 1, 2 step commands, yes/no wh- questions. Suspect verbal expression intact with: naming, syntax, conversation, description WFL with short phrases. [place] Dysarthria Assessment tool portions administered. Oral mech significant for L droop, reduced range of motion. Respiration: short breath phrases, audible inspiration. Phonation: mild hoarseness, monopitch, monoloudness, reduced loudness, MTT 4.2 seconds - reduced for age and gender. Resonance: mild hyponasality. Prosody: able to vary. Articulation:imprecise, short phrases, slow rate. AMR: p: 12; t: 10; k: 7. SMR: 6 rated as \"fair.\"      Clinical impression: mild to moderate flaccid dysarthria.  -AB       SLP Evaluation Clinical Impressions    SLP Diagnosis mild-moderate;dysarthria  -AB    Rehab Potential/Prognosis fair  -AB    [facility] Criteria for Skilled Therapy Interventions Met yes  -AB    Functional Impact difficulty in expressing complex messages  -AB       SLP Treatment Clinical Impressions    Care Plan Review evaluation/treatment results reviewed;care plan/treatment goals reviewed;risks/benefits reviewed;patient/other agree to care plan;current/potential barriers reviewed  -AB       Recommendations    Therapy Frequency (SLP SLC) PRN  -AB    Predicted Duration Therapy Intervention (Days) until discharge  -AB    Anticipated Discharge Disposition (SLP) unknown  -AB       Communication Treatment " Objective and Progress Goals (SLP)    [facility] Patient will demonstrate functional speech skills for return to discharge environment  -AB    Diagnostic Treatment Objective Diagnostic Treatment Objectives (Group)  -AB    Motor Speech/Dysarthria Treatment Objectives Motor Speech/Dysarthria Treatment Objectives (Group)  -AB       Diagnostic Treatment Objectives    Diagnostic Treatment Objective Selection Diagnostic Treatment Objectives  -AB       SLP Diagnostic Treatment     Patient will participate in further assessment in the following areas clarification of baseline cognitive communication status   -AB    Time Frame (Diagnostic) short term goal (STG)  -AB    Progress/Outcomes (Additional Goal 1, SLP) new goal  -AB       Motor Speech/Dysarthria Treatment Objectives    Respiratory Support Selection respiratory support, SLP goal 1  -AB    Articulation Selection articulation, SLP goal 1  -AB       Respiratory Support Goal 1 (SLP)    Improve Respiratory Support Goal 1 (SLP) increasing length of exhalation;repeating a sentence on exhalation;90%;independently (over 90% accuracy)  -AB    Time Frame (Respiratory Support Goal 1, SLP) short term goal (STG)  -AB    Progress/Outcomes (Respiratory Support Goal 1, SLP) new goal  -AB       Articulation Goal 1 (SLP)    Improve Articulation Goal 1 (SLP) of specific sounds in connected speech;by over-articulating in connected speech;90%;independently (over 90% accuracy)  -AB    Time Frame (Articulation Goal 1, SLP) short term goal (STG)  -AB    Progress/Outcomes (Articulation Goal 1, SLP) new goal  -AB              User Key  (r) = Recorded By, (t) = Taken By, (c) = Cosigned By      Initials Name Effective Dates    Rachael Mauro, MS CCC-SLP 12/27/22 -                        EDUCATION  The patient has been educated in the following areas:     Cognitive Impairment Communication Impairment.           SLP GOALS       Row Name 12/30/23 1000          SLP Diagnostic Treatment      Patient will participate in further assessment in the following areas clarification of baseline cognitive communication status   -AB    Time Frame (Diagnostic) short term goal (STG)  -AB    Progress/Outcomes (Additional Goal 1, SLP) new goal  -AB       Respiratory Support Goal 1 (SLP)    Improve Respiratory Support Goal 1 (SLP) increasing length of exhalation;repeating a sentence on exhalation;90%;independently (over 90% accuracy)  -AB    Time Frame (Respiratory Support Goal 1, SLP) short term goal (STG)  -AB    Progress/Outcomes (Respiratory Support Goal 1, SLP) new goal  -AB       Articulation Goal 1 (SLP)    Improve Articulation Goal 1 (SLP) of specific sounds in connected speech;by over-articulating in connected speech;90%;independently (over 90% accuracy)  -AB    Time Frame (Articulation Goal 1, SLP) short term goal (STG)  -AB    Progress/Outcomes (Articulation Goal 1, SLP) new goal  -AB              User Key  (r) = Recorded By, (t) = Taken By, (c) = Cosigned By      Initials Name Provider Type    AB Rachael Forrest, MS CCC-SLP Speech and Language Pathologist                            Time Calculation:         Therapy Charges for Today       Code Description Service Date Service Provider Modifiers Qty    97914534102 HC ST EVAL ORAL PHARYNG SWALLOW 3 12/30/2023 Rachael Forrest, MS CCC-SLP GN 1    13532217106 HC ST EVAL SPEECH AND PROD W LANG  3 12/30/2023 Rachael Forrest, MS CCC-SLP GN 1                       Rachael Forrest MS CCC-SLP  12/30/2023

## 2023-12-30 NOTE — H&P
Critical Care History and Physical     Neeraj Martin : 1985 MRN:[id number] LOS:0 ROOM: [facility]     Reason for admission: CVA (cerebral vascular accident)     Assessment / Plan     Altered Mental Status- resolved  R/o Cerebral Vascular accident  H/o seizures  -S/p TNK   -CT negative   -Neurology consulted  -Frequent neurochecks/NIHSS per protocol  -Bleeding precautions  -Repeat CT 24 hours post TNK administration  -Echo pending  -BP control, keep SBP < 180  -NPO until seen by SLP  -Lipid panel WNL  -A1c 5.6  -PT/OT      Nutrition:   NPO Diet NPO Type: Strict NPO     DVT prophylaxis:  Mechanical DVT prophylaxis orders are present.     History of Present illness     Neeraj Martin is a 38 y.o. male with PMH of seizure, TBI, alcohol abuse, and drug abuse who presented to the hospital for altered mental status, and was admitted with a principal diagnosis of CVA (cerebral vascular accident).  Patient was brought to emergency room via EMS after he was found unresponsive by his cell mate.  Patient was found down by a officers and it was reported that he had possible seizure-like activity.  He was postictal, and when he came to the emergency room he was waking up but unable to move his left side.  A code stroke was initiated, CT was negative and patient was given TNK.    ACP: Patient does not have advance directive on file at this facility.  Full code, full interventions.    Patient was seen and examined on 23 at 04:31 EST .    Subjective / Review of systems     Review of Systems   Constitutional:  Negative for chills and fever.   HENT:  Negative for congestion and sore throat.    Respiratory:  Negative for cough and shortness of breath.    Cardiovascular:  Negative for chest pain and palpitations.   Gastrointestinal:  Negative for abdominal pain and nausea.   Endocrine: Negative for heat intolerance and polyuria.   Genitourinary:  Negative for dysuria and urgency.   Musculoskeletal:  Negative for arthralgias and  myalgias.   Skin:  Negative for rash and wound.   Neurological:  Positive for seizures, speech difficulty, weakness and numbness.   Psychiatric/Behavioral:  Negative for suicidal ideas. The patient is not nervous/anxious.         Past Medical/Surgical/Social/Family History & Allergies     Past Medical History:   Diagnosis Date    Epilepsy     Headache     Hepatitis C virus infection 4/26/2021    Memory dysfunction following head trauma     Methicillin resistant Staphylococcus aureus infection 9/30/2018    Seizures     Stroke       Past Surgical History:   Procedure Laterality Date    BRAIN SURGERY Right 2020    HERNIA REPAIR        Social History     Socioeconomic History    Marital status:    Tobacco Use    Smoking status: Every Day     Packs/day: 1.00     Years: 15.00     Additional pack years: 0.00     Total pack years: 15.00     Types: Cigarettes     Passive exposure: Current    Smokeless tobacco: Current     Types: Chew   Vaping Use    Vaping Use: Every day    Substances: Nicotine    Devices: Disposable   Substance and Sexual Activity    Alcohol use: Yes     Comment: 1-2 drinks p/ wk    Drug use: Not Currently     Comment: 2022    Sexual activity: Yes      Family History   Family history unknown: Yes      No Known Allergies   Social Determinants of Health     Tobacco Use: High Risk (12/29/2023)    Patient History     Smoking Tobacco Use: Every Day     Smokeless Tobacco Use: Current     Passive Exposure: Current   Alcohol Use: Not on file   Financial Resource Strain: Not on file   Food Insecurity: Not on file   Transportation Needs: Not on file   Physical Activity: Not on file   Stress: Not on file   Social Connections: Unknown (10/9/2023)    Family and Community Support     Help with Day-to-Day Activities: Not on file     Lonely or Isolated: Not on file   Interpersonal Safety: Not At Risk (12/29/2023)    Abuse Screen     Unsafe at Home or Work/School: no     Feels Threatened by Someone?: no     Does  Anyone Keep You from Contacting Others or Doint Things Outside the Home?: no     Physical Sign of Abuse Present: no   Depression: At risk (11/15/2023)    PHQ-2     PHQ-2 Score: 22   Housing Stability: Unknown (10/9/2023)    Housing Stability     Current Living Arrangements: Not on file     Potentially Unsafe Housing Conditions: Not on file   Utilities: Not on file   Health Literacy: Unknown (10/9/2023)    Education     Help with school or training?: Not on file     Preferred Language: Not on file   Employment: Unknown (10/9/2023)    Employment     Do you want help finding or keeping work or a job?: Not on file   Disabilities: Unknown (10/9/2023)    Disabilities     Concentrating, Remembering, or Making Decisions Difficulty: Not on file     Doing Errands Independently Difficulty: Not on file        Home Medications     Prior to Admission medications    Medication Sig Start Date End Date Taking? Authorizing Provider   Cariprazine HCl (Vraylar) 4.5 MG capsule capsule Take 1 capsule by mouth Daily. 11/16/23   Fiona Grace APRN   levETIRAcetam (KEPPRA) 1000 MG tablet Take 1 tablet by mouth twice daily 12/25/23   Nanette Sterling APRN   Narcan 4 MG/0.1ML nasal spray 2 (Two) Times a Day As Needed. 4/6/21   Provider, MD Fouzia        Objective / Physical Exam     Vital signs:  Temp: 98.6 °F (37 °C)  BP: 115/78  Heart Rate: 97  Resp: 21  SpO2: 100 %  Weight: 86 kg (189 lb 9.5 oz)    Admission Weight: Weight: 86.2 kg (190 lb)    Physical Exam  Constitutional:       Appearance: Normal appearance. He is normal weight.   HENT:      Head: Normocephalic.      Nose: Nose normal.      Mouth/Throat:      Mouth: Mucous membranes are moist.   Eyes:      General: Visual field deficit present.      Extraocular Movements: Extraocular movements intact.      Pupils: Pupils are equal, round, and reactive to light.   Cardiovascular:      Rate and Rhythm: Normal rate and regular rhythm.      Pulses: Normal pulses.      Heart  sounds: Normal heart sounds.   Pulmonary:      Effort: Pulmonary effort is normal.      Breath sounds: Normal breath sounds.   Abdominal:      General: Abdomen is flat. Bowel sounds are normal.      Palpations: Abdomen is soft.   Musculoskeletal:         General: Normal range of motion.      Cervical back: Normal range of motion.   Skin:     General: Skin is warm.   Neurological:      Mental Status: He is easily aroused. He is lethargic.      Cranial Nerves: Dysarthria present.      Motor: Weakness present.      Comments: Patient has noted dysarthria  Patient able to move all 4 extremities  He has decreased sensation on his left leg which has improved after TNK  Some visual deficits on the left side  He is lethargic but easily arousable   Psychiatric:         Mood and Affect: Mood normal.         Behavior: Behavior normal.          Labs     Results from last 7 days   Lab Units 12/29/23  2210   WBC 10*3/mm3 6.40   HEMATOCRIT % 43.6   PLATELETS 10*3/mm3 202      Results from last 7 days   Lab Units 12/29/23  2236   SODIUM mmol/L 140   POTASSIUM mmol/L 4.4   CHLORIDE mmol/L 104   CO2 mmol/L 28.0   BUN mg/dL 13   CREATININE mg/dL 0.82        Imaging     Chest X ray: My independent assessment showed no infiltrates or effusions    EKG: My independent evaluation showed normal sinus rhythm, no ST -T changes    Current Medications     Scheduled Meds:  atorvastatin, 80 mg, Oral, Nightly  levETIRAcetam, 500 mg, Intravenous, Q12H  sodium chloride, 10 mL, Intravenous, Q12H         Continuous Infusions:        Patient continues to be critically ill, remains at risk of clinical deterioration or death and needed high complexity decision making. I have spent a total of 35 minutes providing critical care services to this patient including but not limited to: review of labs/ microbiology/imaging/medications, serial monitoring of vital signs,  review of other consultant's notes, review of events in the last 24 hrs, monitoring  input/output, review of treatment plan with bedside nurse, RT and other treatment team, management of life support and nutrition needs. No family at bedside.    Time spent in performing separately billable procedures and updating family is not included in the critical care time.       MARYCARMEN Reyes   Critical Care  12/30/23   04:31 EST

## 2023-12-31 ENCOUNTER — APPOINTMENT (OUTPATIENT)
Dept: CT IMAGING | Facility: HOSPITAL | Age: 38
DRG: 062 | End: 2023-12-31
Payer: MEDICAID

## 2023-12-31 LAB
ALBUMIN SERPL-MCNC: 4.1 G/DL (ref 3.5–5.2)
ALBUMIN/GLOB SERPL: 1.4 G/DL
ALP SERPL-CCNC: 45 U/L (ref 39–117)
ALT SERPL W P-5'-P-CCNC: 9 U/L (ref 1–41)
AMMONIA BLD-SCNC: 24 UMOL/L (ref 16–60)
ANION GAP SERPL CALCULATED.3IONS-SCNC: 9 MMOL/L (ref 5–15)
AST SERPL-CCNC: 13 U/L (ref 1–40)
BASOPHILS # BLD AUTO: 0 10*3/MM3 (ref 0–0.2)
BASOPHILS NFR BLD AUTO: 0.3 % (ref 0–1.5)
BILIRUB SERPL-MCNC: 0.5 MG/DL (ref 0–1.2)
BUN SERPL-MCNC: 8 MG/DL (ref 6–20)
BUN/CREAT SERPL: 12.1 (ref 7–25)
CALCIUM SPEC-SCNC: 9.1 MG/DL (ref 8.6–10.5)
CHLORIDE SERPL-SCNC: 104 MMOL/L (ref 98–107)
CO2 SERPL-SCNC: 29 MMOL/L (ref 22–29)
CREAT SERPL-MCNC: 0.66 MG/DL (ref 0.76–1.27)
D-LACTATE SERPL-SCNC: 1.6 MMOL/L (ref 0.5–2)
D-LACTATE SERPL-SCNC: 2.5 MMOL/L (ref 0.5–2)
DEPRECATED RDW RBC AUTO: 48.6 FL (ref 37–54)
EGFRCR SERPLBLD CKD-EPI 2021: 122.4 ML/MIN/1.73
EOSINOPHIL # BLD AUTO: 0 10*3/MM3 (ref 0–0.4)
EOSINOPHIL NFR BLD AUTO: 0.3 % (ref 0.3–6.2)
ERYTHROCYTE [DISTWIDTH] IN BLOOD BY AUTOMATED COUNT: 15.8 % (ref 12.3–15.4)
GLOBULIN UR ELPH-MCNC: 2.9 GM/DL
GLUCOSE SERPL-MCNC: 101 MG/DL (ref 65–99)
HCT VFR BLD AUTO: 43 % (ref 37.5–51)
HGB BLD-MCNC: 14.5 G/DL (ref 13–17.7)
LYMPHOCYTES # BLD AUTO: 1.9 10*3/MM3 (ref 0.7–3.1)
LYMPHOCYTES NFR BLD AUTO: 21.3 % (ref 19.6–45.3)
MCH RBC QN AUTO: 30.4 PG (ref 26.6–33)
MCHC RBC AUTO-ENTMCNC: 33.8 G/DL (ref 31.5–35.7)
MCV RBC AUTO: 90 FL (ref 79–97)
MONOCYTES # BLD AUTO: 1 10*3/MM3 (ref 0.1–0.9)
MONOCYTES NFR BLD AUTO: 11.7 % (ref 5–12)
NEUTROPHILS NFR BLD AUTO: 5.9 10*3/MM3 (ref 1.7–7)
NEUTROPHILS NFR BLD AUTO: 66.4 % (ref 42.7–76)
NRBC BLD AUTO-RTO: 0 /100 WBC (ref 0–0.2)
PLATELET # BLD AUTO: 180 10*3/MM3 (ref 140–450)
PMV BLD AUTO: 9.4 FL (ref 6–12)
POTASSIUM SERPL-SCNC: 4 MMOL/L (ref 3.5–5.2)
PROLACTIN SERPL-MCNC: 9.69 NG/ML (ref 4.04–15.2)
PROT SERPL-MCNC: 7 G/DL (ref 6–8.5)
RBC # BLD AUTO: 4.77 10*6/MM3 (ref 4.14–5.8)
SODIUM SERPL-SCNC: 142 MMOL/L (ref 136–145)
WBC NRBC COR # BLD AUTO: 8.9 10*3/MM3 (ref 3.4–10.8)

## 2023-12-31 PROCEDURE — 99233 SBSQ HOSP IP/OBS HIGH 50: CPT | Performed by: PSYCHIATRY & NEUROLOGY

## 2023-12-31 PROCEDURE — 85025 COMPLETE CBC W/AUTO DIFF WBC: CPT | Performed by: NURSE PRACTITIONER

## 2023-12-31 PROCEDURE — 82140 ASSAY OF AMMONIA: CPT | Performed by: PSYCHIATRY & NEUROLOGY

## 2023-12-31 PROCEDURE — 83605 ASSAY OF LACTIC ACID: CPT | Performed by: PSYCHIATRY & NEUROLOGY

## 2023-12-31 PROCEDURE — 70450 CT HEAD/BRAIN W/O DYE: CPT

## 2023-12-31 PROCEDURE — 25010000002 MIDAZOLAM PER 1 MG: Performed by: NURSE PRACTITIONER

## 2023-12-31 PROCEDURE — 25010000002 LEVETRIRACETAM PER 10 MG: Performed by: PSYCHIATRY & NEUROLOGY

## 2023-12-31 PROCEDURE — 25010000002 LORAZEPAM PER 2 MG

## 2023-12-31 PROCEDURE — C9254 INJECTION, LACOSAMIDE: HCPCS | Performed by: PSYCHIATRY & NEUROLOGY

## 2023-12-31 PROCEDURE — 99221 1ST HOSP IP/OBS SF/LOW 40: CPT | Performed by: NEUROLOGICAL SURGERY

## 2023-12-31 PROCEDURE — 25010000002 LACOSAMIDE 200 MG/20ML SOLUTION: Performed by: PSYCHIATRY & NEUROLOGY

## 2023-12-31 PROCEDURE — 80053 COMPREHEN METABOLIC PANEL: CPT | Performed by: NURSE PRACTITIONER

## 2023-12-31 RX ORDER — LACOSAMIDE 10 MG/ML
200 INJECTION, SOLUTION INTRAVENOUS EVERY 12 HOURS
Status: CANCELLED | OUTPATIENT
Start: 2023-12-31

## 2023-12-31 RX ORDER — THIAMINE HYDROCHLORIDE 100 MG/ML
200 INJECTION, SOLUTION INTRAMUSCULAR; INTRAVENOUS EVERY 8 HOURS SCHEDULED
Qty: 30 ML | Refills: 0 | Status: DISCONTINUED | OUTPATIENT
Start: 2024-01-01 | End: 2024-01-04 | Stop reason: HOSPADM

## 2023-12-31 RX ORDER — MIDAZOLAM HYDROCHLORIDE 1 MG/ML
2-4 INJECTION INTRAMUSCULAR; INTRAVENOUS ONCE AS NEEDED
Status: COMPLETED | OUTPATIENT
Start: 2023-12-31 | End: 2023-12-31

## 2023-12-31 RX ORDER — LORAZEPAM 1 MG/1
2 TABLET ORAL EVERY 6 HOURS
Qty: 8 TABLET | Refills: 0 | Status: DISCONTINUED | OUTPATIENT
Start: 2024-01-01 | End: 2024-01-01

## 2023-12-31 RX ORDER — LORAZEPAM 1 MG/1
1 TABLET ORAL EVERY 6 HOURS
Qty: 4 TABLET | Refills: 0 | Status: DISCONTINUED | OUTPATIENT
Start: 2024-01-02 | End: 2024-01-01

## 2023-12-31 RX ORDER — LACOSAMIDE 10 MG/ML
200 INJECTION, SOLUTION INTRAVENOUS EVERY 12 HOURS
Status: DISCONTINUED | OUTPATIENT
Start: 2023-12-31 | End: 2024-01-04 | Stop reason: HOSPADM

## 2023-12-31 RX ORDER — MIDAZOLAM HYDROCHLORIDE 1 MG/ML
4 INJECTION INTRAMUSCULAR; INTRAVENOUS
Status: DISCONTINUED | OUTPATIENT
Start: 2023-12-31 | End: 2024-01-04 | Stop reason: HOSPADM

## 2023-12-31 RX ORDER — MIDAZOLAM HYDROCHLORIDE 1 MG/ML
2 INJECTION INTRAMUSCULAR; INTRAVENOUS
Status: DISCONTINUED | OUTPATIENT
Start: 2023-12-31 | End: 2024-01-04 | Stop reason: HOSPADM

## 2023-12-31 RX ORDER — LORAZEPAM 2 MG/ML
1 INJECTION INTRAMUSCULAR ONCE
Status: DISCONTINUED | OUTPATIENT
Start: 2023-12-31 | End: 2023-12-31

## 2023-12-31 RX ORDER — LORAZEPAM 1 MG/1
2 TABLET ORAL
Status: DISCONTINUED | OUTPATIENT
Start: 2023-12-31 | End: 2024-01-04 | Stop reason: HOSPADM

## 2023-12-31 RX ORDER — LORAZEPAM 1 MG/1
1 TABLET ORAL
Status: DISCONTINUED | OUTPATIENT
Start: 2023-12-31 | End: 2024-01-04 | Stop reason: HOSPADM

## 2023-12-31 RX ORDER — FOLIC ACID 1 MG/1
1 TABLET ORAL DAILY
Status: DISCONTINUED | OUTPATIENT
Start: 2024-01-01 | End: 2024-01-04 | Stop reason: HOSPADM

## 2023-12-31 RX ORDER — LORAZEPAM 2 MG/ML
INJECTION INTRAMUSCULAR
Status: COMPLETED
Start: 2023-12-31 | End: 2023-12-31

## 2023-12-31 RX ORDER — LEVOCARNITINE 1 G/10ML
500 SOLUTION ORAL EVERY 12 HOURS
Status: DISCONTINUED | OUTPATIENT
Start: 2023-12-31 | End: 2024-01-04 | Stop reason: HOSPADM

## 2023-12-31 RX ADMIN — LEVETIRACETAM 1500 MG: 100 INJECTION, SOLUTION INTRAVENOUS at 08:48

## 2023-12-31 RX ADMIN — VALPROIC ACID 750 MG: 250 SOLUTION ORAL at 08:45

## 2023-12-31 RX ADMIN — LEVOCARNITINE 500 MG: 1 SOLUTION ORAL at 18:23

## 2023-12-31 RX ADMIN — Medication 10 ML: at 08:45

## 2023-12-31 RX ADMIN — LACOSAMIDE 200 MG: 10 INJECTION, SOLUTION INTRAVENOUS at 17:43

## 2023-12-31 RX ADMIN — ATORVASTATIN CALCIUM 80 MG: 40 TABLET, FILM COATED ORAL at 20:48

## 2023-12-31 RX ADMIN — LEVETIRACETAM 1500 MG: 100 INJECTION, SOLUTION INTRAVENOUS at 20:48

## 2023-12-31 RX ADMIN — Medication 10 ML: at 20:57

## 2023-12-31 RX ADMIN — MIDAZOLAM 2 MG: 1 INJECTION INTRAMUSCULAR; INTRAVENOUS at 23:32

## 2023-12-31 RX ADMIN — MIDAZOLAM 4 MG: 1 INJECTION INTRAMUSCULAR; INTRAVENOUS at 22:05

## 2023-12-31 RX ADMIN — VALPROIC ACID 750 MG: 250 SOLUTION ORAL at 20:48

## 2023-12-31 RX ADMIN — VALPROIC ACID 750 MG: 250 SOLUTION ORAL at 15:31

## 2023-12-31 RX ADMIN — LORAZEPAM 1 MG: 2 INJECTION INTRAMUSCULAR; INTRAVENOUS at 03:25

## 2023-12-31 RX ADMIN — Medication 2000 UNITS: at 08:47

## 2023-12-31 RX ADMIN — VALPROIC ACID 500 MG: 250 SOLUTION ORAL at 03:15

## 2023-12-31 NOTE — CONSULTS
[facility] Medicine Services  Consult Note    Patient Name: Neeraj Martin  : 1985  MRN: [id number]  Primary Care Physician:  Nanette Sterling APRN  Referring Physician: MARYCARMEN House  Date of admission: 2023  Date and Time of Care: 2023 at 1250    Inpatient Hospitalist Consult  Consult performed by: Corinna Wick APRN  Consult ordered by: Tavon Morgan MD            Reason for Consult/ Chief Complaint: Medical Management     Consult Requested By: Dr. Morgan Intensivist     Subjective:     History of Present Illness:   Per the documentation by Dr. Morgan, dated 2023, patient is a 38 y.o. male with PMH of traumatic brain injury, seizures, alcohol abuse, drug abuse presented to the hospital for altered mental status and was admitted with a principal diagnosis of CVA (cerebral vascular accident).  Presumed to be acute stroke versus seizure, loaded with Keppra.  CT head with areas of encephalomalacia in right parietal lobe, left frontotemporal areas.  CTA with no large vessel occlusion.  Neurology was consulted, s/p tenecteplase.  MRI brain with no evidence of acute infarct but did show multifocal encephalomalacia in the right parietal, left frontotemporal lobes.  Subsequently, repeat CT head in 24 hours showed small left frontal parafalcine subdural hematoma without mass effect.  Neurosurgery was consulted and recommended follow-up in office in 4 weeks.     2023 patient to be transferred to [facility] and care to be taken over by hospitalist service.    Patient was seen and evaluated, patient is currently bilaterally hand cuffed to bed via bilateral upper extremities and lower extremities with  at bedside. No acute distress. Patient is alert and oriented to self only, disoriented to time, place and situation. Patient denies any complaints.     Review of Systems:   Review of Systems   Unable to perform ROS: Mental status change       Personal History:      Past Medical History:   Diagnosis Date    Epilepsy     Headache     Hepatitis C virus infection 4/26/2021    Memory dysfunction following head trauma     Methicillin resistant Staphylococcus aureus infection 9/30/2018    Seizures     Stroke        Past Surgical History:   Procedure Laterality Date    BRAIN SURGERY Right 2020    HERNIA REPAIR         Family History: Family history is unknown by patient. Otherwise pertinent FHx was reviewed and not pertinent to current issue.    Social History:  reports that he has been smoking cigarettes. He has a 15.00 pack-year smoking history. He has been exposed to tobacco smoke. His smokeless tobacco use includes chew. He reports current alcohol use. He reports that he does not currently use drugs.    Home Medications:   Cariprazine HCl, OLANZapine, divalproex, levETIRAcetam, and naloxone    Allergies:  No Known Allergies      Objective:     Vital Signs  Temp:  [98.3 °F (36.8 °C)-98.7 °F (37.1 °C)] 98.3 °F (36.8 °C)  Heart Rate:  [61-92] 67  Resp:  [11-25] 17  BP: ()/() 141/93   Body mass index is 27.91 kg/m².    Physical Exam  Physical Exam  Vitals reviewed.   Eyes:      Extraocular Movements: Extraocular movements intact.      Pupils: Pupils are equal, round, and reactive to light.   Cardiovascular:      Rate and Rhythm: Normal rate and regular rhythm.   Pulmonary:      Effort: Pulmonary effort is normal.      Breath sounds: Normal breath sounds.   Abdominal:      General: Abdomen is flat.      Palpations: Abdomen is soft.   Musculoskeletal:         General: Normal range of motion.      Left forearm: Swelling present.      Left wrist: Swelling present.      Left hand: Swelling present. Decreased capillary refill.   Skin:     Capillary Refill: Capillary refill takes less than 2 seconds.      Comments: LUE discoloration likely secondary to handcuffs in place due to patient being in police custody.    Neurological:      Mental Status: He is alert. He is  disoriented and confused.      Comments: Patient alert to self, disoriented to time, place, and situation.          Scheduled Meds   [Held by provider] aspirin, 81 mg, Oral, Daily  atorvastatin, 80 mg, Oral, Nightly  cholecalciferol, 2,000 Units, Oral, Daily  [Held by provider] enoxaparin, 40 mg, Subcutaneous, Daily  levETIRAcetam, 1,500 mg, Intravenous, Q12H  sodium chloride, 10 mL, Intravenous, Q12H  valproate, 750 mg, Oral, Q6H       PRN Meds     midazolam    sodium chloride    sodium chloride    sodium chloride   Infusions         Diagnostic Data    Results from last 7 days   Lab Units 12/31/23  0830   WBC 10*3/mm3 8.90   HEMOGLOBIN g/dL 14.5   HEMATOCRIT % 43.0   PLATELETS 10*3/mm3 180   GLUCOSE mg/dL 101*   CREATININE mg/dL 0.66*   BUN mg/dL 8   SODIUM mmol/L 142   POTASSIUM mmol/L 4.0   AST (SGOT) U/L 13   ALT (SGPT) U/L 9   ALK PHOS U/L 45   BILIRUBIN mg/dL 0.5   ANION GAP mmol/L 9.0       CT Head Without Contrast    Result Date: 12/31/2023  Impression: 1.Stable 4 mm subdural hematoma along the left side of the posterior falx. 2.Stable chronic infarcts in the right parietal lobe, left frontal lobe and left temporal lobe. 3.Stable changes of prior right-sided craniectomy and cranioplasty. Electronically Signed: Jah Jiang MD  12/31/2023 4:08 AM EST  Workstation ID: [id number]    CT Head Without Contrast    Result Date: 12/30/2023  Impression: 1.New small posterior left frontal parafalcine subdural hematoma without mass effect. 2.Stable chronic infarcts in the right parietal lobe, left temporal lobe and left inferior frontal lobe. 3.Evidence of prior right convexity craniectomy with cranioplasty. Electronically Signed: Jah Jiang MD  12/30/2023 11:22 PM EST  Workstation ID: [id number]    MRI Brain Without Contrast    Result Date: 12/30/2023  Impression: Limited exam as described. There is no evidence of acute brain ischemia. There is multifocal encephalomalacia involving the right parietal, left frontal,  and left temporal lobes Electronically Signed: Afshin Lana  12/30/2023 5:00 PM EST  Workstation ID: [id number]    XR Abdomen KUB    Result Date: 12/30/2023  Impression: MRI screening KUB with no evidence of metallic foreign body. Electronically Signed: Robles Dias MD  12/30/2023 12:56 PM EST  Workstation ID: [id number]    XR Chest 1 View    Result Date: 12/29/2023  Impression: Hypoinflated lungs with left basilar airspace disease which may relate to atelectasis, aspiration or pneumonia difficult to exclude. Electronically Signed: Johnson Campbell MD  12/29/2023 11:23 PM EST  Workstation ID: [id number]    CT Angiogram Head w AI Analysis of LVO    Result Date: 12/29/2023  Impression: 1. No central intracranial large vessel occlusion. 2. Patent bilateral carotid arteries. 3. Patent bilateral vertebral arteries. 4. Question of short segment moderate stenosis of left MCA M2 branch. Intracranial venous contamination and suboptimal contrast opacification limits assessment. 5. Areas of suspected chronic encephalomalacia at right posterior parietal lobe, left frontal lobe and left temporal lobe. Electronically Signed: Johnson Campbell MD  12/29/2023 11:08 PM EST  Workstation ID: [id number]    CT Angiogram Neck    Result Date: 12/29/2023  Impression: 1. No central intracranial large vessel occlusion. 2. Patent bilateral carotid arteries. 3. Patent bilateral vertebral arteries. 4. Question of short segment moderate stenosis of left MCA M2 branch. Intracranial venous contamination and suboptimal contrast opacification limits assessment. 5. Areas of suspected chronic encephalomalacia at right posterior parietal lobe, left frontal lobe and left temporal lobe. Electronically Signed: Johnson Campbell MD  12/29/2023 11:08 PM EST  Workstation ID: [id number]    CT CEREBRAL PERFUSION WITH & WITHOUT CONTRAST    Result Date: 12/29/2023  Impression: 1. No core infarct. 2. Area of prolonged transit time in the left frontal lobe which could be artifactual due  to volume averaging with the calvarium given positioning. This may also relate to an area of chronic encephalomalacia secondary to remote infarct seen on noncontrast CT. Consider brain MR follow-up if concern for superimposed ischemia at this site. Electronically Signed: Johnson Campbell MD  12/29/2023 10:45 PM EST  Workstation ID: [id number]    CT Head Without Contrast Stroke Protocol    Result Date: 12/29/2023  Impression: 1. No intracranial hemorrhage. 2. Areas of encephalomalacia involving right posterior parietal lobe, left frontal lobe and left temporal lobe likely sequela of remote infarcts. 3. Postsurgical changes of right sided craniectomy. Electronically Signed: Johnson Campbell MD  12/29/2023 10:27 PM EST  Workstation ID: [id number]       I reviewed the patient's new clinical results.    Assessment/Plan:     Active and Resolved Problems  Active Hospital Problems    Diagnosis  POA    **CVA (cerebral vascular accident) [I63.9]  Yes      Resolved Hospital Problems   No resolved problems to display.       Acute encephalopathy  Suspected stroke versus Jonn's paralysis versus seizures.  Neurology following, continue with neurochecks every 2 hours.     Seizure disorder  On Keppra and Depakote.  Neurology following.     Left frontal subdural hematoma  Likely a complication of tenecteplase.  Neurosurgery recommended conservative management and follow-up in their office in 4 weeks.     Traumatic brain injury/history of stroke: Hold aspirin at this time.  Dyslipidemia: Chronic and stable. Continue statins.    Discoloration of left upper extremity  likely secondary to handcuff placement, length and arm position, patient in police custody and noted to have bilateral upper and lower metal cuffs in place  Police unable to change out cuff to provide for more length that would improve movement   Discussing alternative restraint options with attending Dr. Dill, Dr. Dill to evaluate patient.     DVT prophylaxis:  Medical and  mechanical DVT prophylaxis orders are present.     Code status is   Code Status and Medical Interventions:   Ordered at: 12/31/23 0757     Code Status (Patient has no pulse and is not breathing):    CPR (Attempt to Resuscitate)     Medical Interventions (Patient has pulse or is breathing):    Full Support       Plan for disposition:pending clinical improvement and possible placement needs.     Time: 30 minutes        Signature: Electronically signed by MARYCARMEN Perea, 12/31/23, 13:33 EST.  [facility] Hospitalist Team

## 2023-12-31 NOTE — PLAN OF CARE
Goal Outcome Evaluation:  Plan of Care Reviewed With: patient        Progress: declining- Pt noted to continue to have seizures and new subdural hematoma. New orders received from Neurology and Neurosurgery aware of changes. NIH remains the same at this time and after seizure activity.

## 2023-12-31 NOTE — PROGRESS NOTES
Critical Care Progress Note   Neeraj Martin : 1985 MRN:[id number] LOS:1     Principal Problem: CVA (cerebral vascular accident)     Reason for follow up: All the medical problems listed below    Summary     A 38 y.o. male with PMH of traumatic brain injury, seizures, alcohol abuse, drug abuse presented to the hospital for altered mental status and was admitted with a principal diagnosis of CVA (cerebral vascular accident).  Presumed to be acute stroke versus seizure, loaded with Keppra.  CT head with areas of encephalomalacia in right parietal lobe, left frontotemporal areas.  CTA with no large vessel occlusion.  Neurology was consulted, s/p tenecteplase.  MRI brain with no evidence of acute infarct but did show multifocal encephalomalacia in the right parietal, left frontotemporal lobes.  Subsequently, repeat CT head in 24 hours showed small left frontal parafalcine subdural hematoma without mass effect.  Neurosurgery was consulted and recommended follow-up in office in 4 weeks.    Significant events     23 : Transfer to [facility] under hospitalist service.    Assessment / Plan     Acute encephalopathy  Suspected stroke versus Jonn's paralysis versus seizures.  Neurology following, continue with neurochecks every 2 hours.    Seizure disorder  On Keppra and Depakote.  Neurology following.    Left frontal subdural hematoma  Likely a complication of tenecteplase.  Neurosurgery recommended conservative management and follow-up in their office in 4 weeks.    Traumatic brain injury/history of stroke: Hold aspirin at this time.  Dyslipidemia: Chronic and stable. Continue statins.    Code status:   Code Status (Patient has no pulse and is not breathing): CPR (Attempt to Resuscitate)  Medical Interventions (Patient has pulse or is breathing): Full Support       Nutrition: Diet: Regular/House Diet; Safe Tray; Texture: Regular Texture (IDDSI 7); Fluid Consistency: Thin (IDDSI 0)   Patient isn't on Tube Feeding    DVT  prophylaxis:  Medical and mechanical DVT prophylaxis orders are present.     Subjective / Review of systems     Review of Systems   More confused today, able to tell his name but thinks he is at home.  Objective / Physical Exam   Vital signs:  Temp: 98.3 °F (36.8 °C)  BP: 155/94  Heart Rate: 61  Resp: 15  SpO2: 97 %  Weight: 85.7 kg (189 lb)    Admission Weight: Weight: 86.2 kg (190 lb)  Current Weight: Weight: 85.7 kg (189 lb)    Input/Output in last 24 hours:    Intake/Output Summary (Last 24 hours) at 12/31/2023 1143  Last data filed at 12/31/2023 0600  Gross per 24 hour   Intake 1180 ml   Output 2100 ml   Net -920 ml      Physical Exam  Vitals and nursing note reviewed.   Constitutional:       General: He is awake. He is not in acute distress.     Appearance: Normal appearance.   HENT:      Mouth/Throat:      Mouth: Mucous membranes are moist.      Pharynx: Oropharynx is clear.   Eyes:      General: No scleral icterus.     Extraocular Movements: Extraocular movements intact.      Conjunctiva/sclera: Conjunctivae normal.   Cardiovascular:      Rate and Rhythm: Normal rate.      Heart sounds: No murmur heard.     No gallop.   Pulmonary:      Breath sounds: Normal breath sounds. No wheezing or rales.   Abdominal:      General: Bowel sounds are normal.      Palpations: Abdomen is soft.      Tenderness: There is no abdominal tenderness.   Musculoskeletal:         General: No tenderness.      Right lower leg: No edema.      Left lower leg: No edema.   Neurological:      Mental Status: He is alert.      Comments: Alert, awake, oriented only to his name.  Confused, able to lift all 4 extremities against gravity, on 4 point restraints.   Psychiatric:         Behavior: Behavior is cooperative.        Radiology and Labs     Results from last 7 days   Lab Units 12/31/23  0830 12/29/23  2210   WBC 10*3/mm3 8.90 6.40   HEMATOCRIT % 43.0 43.6   PLATELETS 10*3/mm3 180 202      Results from last 7 days   Lab Units 12/31/23  0830  12/29/23  2236   SODIUM mmol/L 142 140   POTASSIUM mmol/L 4.0 4.4   CHLORIDE mmol/L 104 104   CO2 mmol/L 29.0 28.0   BUN mg/dL 8 13   CREATININE mg/dL 0.66* 0.82      Current medications   Scheduled Meds: [Held by provider] aspirin, 81 mg, Oral, Daily  atorvastatin, 80 mg, Oral, Nightly  cholecalciferol, 2,000 Units, Oral, Daily  [Held by provider] enoxaparin, 40 mg, Subcutaneous, Daily  levETIRAcetam, 1,500 mg, Intravenous, Q12H  sodium chloride, 10 mL, Intravenous, Q12H  valproate, 750 mg, Oral, Q6H      Continuous Infusions:      Plan discussed with RN. Reviewed all other data in the last 24 hours, including but not limited to vitals, labs, microbiology, imaging and pertinent notes from other providers.     Tavon Morgan MD   Critical Care  12/31/23   11:43 EST

## 2023-12-31 NOTE — PROGRESS NOTES
LOS: 1 day       Chief Complaint:  Jonn's paralysis after witnessed convulsive seizure    Subjective     Has been having multiple partial seizures with versive head turning left, staring spells and left sided shaking that generalized.     Objective     Vital Signs  Temp:  [98.3 °F (36.8 °C)-98.7 °F (37.1 °C)] 98.3 °F (36.8 °C)  Heart Rate:  [61-97] 61  Resp:  [11-25] 15  BP: ()/(53-98) 155/94  Intake & Output (last day)         12/30 0701  12/31 0700 12/31 0701 01/01 0700    P.O. 1080     I.V. (mL/kg) 100 (1.2)     Total Intake(mL/kg) 1180 (13.8)     Urine (mL/kg/hr) 2100 (1)     Total Output 2100     Net -920                    Physical Exam:       Cognition:   Alert and oriented.  Fund of knowledge preserved.  Concentration and attention normal.   Language normal with normal comprehension, fluent speech     Cranial nerves:     II - Pupils bilaterally briskly reactive to light  No new visual field deficits;  III,IV,VI: Intact  V: Normal facial sensations  VII: Intact   VIII: No new hearing changes  IX, X, XI: Normal gag and shoulder shrug;  XII: Tongue is in the midline.     Sensory:  Intact to light touch in all extremities. Neglects left side to DSS     Motor: Still tends to turn head left and neglects left side though has good power when he tries on left  Reflexes: Plantars are flexor.     Cerebellar: FTN intact     Gait and balance: Deferred     Results Review:     I reviewed the patient's new clinical results.    Lab Results (last 24 hours)       Procedure Component Value Units Date/Time    STAT Lactic Acid, Reflex [[id number]]  (Normal) Collected: 12/31/23 0830    Specimen: Blood Updated: 12/31/23 0904     Lactate 1.6 mmol/L     Comprehensive Metabolic Panel [[id number]]  (Abnormal) Collected: 12/31/23 0830    Specimen: Blood Updated: 12/31/23 0859     Glucose 101 mg/dL      BUN 8 mg/dL      Creatinine 0.66 mg/dL      Sodium 142 mmol/L      Potassium 4.0 mmol/L      Chloride 104 mmol/L      CO2 29.0  mmol/L      Calcium 9.1 mg/dL      Total Protein 7.0 g/dL      Albumin 4.1 g/dL      ALT (SGPT) 9 U/L      AST (SGOT) 13 U/L      Alkaline Phosphatase 45 U/L      Total Bilirubin 0.5 mg/dL      Globulin 2.9 gm/dL      A/G Ratio 1.4 g/dL      BUN/Creatinine Ratio 12.1     Anion Gap 9.0 mmol/L      eGFR 122.4 mL/min/1.73     Narrative:      GFR Normal >60  Chronic Kidney Disease <60  Kidney Failure <15      CBC & Differential [[id number]]  (Abnormal) Collected: 12/31/23 0830    Specimen: Blood Updated: 12/31/23 0842    Narrative:      The following orders were created for panel order CBC & Differential.  Procedure                               Abnormality         Status                     ---------                               -----------         ------                     CBC Auto Differential[[id number]]        Abnormal            Final result                 Please view results for these tests on the individual orders.    CBC Auto Differential [[id number]]  (Abnormal) Collected: 12/31/23 0830    Specimen: Blood Updated: 12/31/23 0842     WBC 8.90 10*3/mm3      RBC 4.77 10*6/mm3      Hemoglobin 14.5 g/dL      Hematocrit 43.0 %      MCV 90.0 fL      MCH 30.4 pg      MCHC 33.8 g/dL      RDW 15.8 %      RDW-SD 48.6 fl      MPV 9.4 fL      Platelets 180 10*3/mm3      Neutrophil % 66.4 %      Lymphocyte % 21.3 %      Monocyte % 11.7 %      Eosinophil % 0.3 %      Basophil % 0.3 %      Neutrophils, Absolute 5.90 10*3/mm3      Lymphocytes, Absolute 1.90 10*3/mm3      Monocytes, Absolute 1.00 10*3/mm3      Eosinophils, Absolute 0.00 10*3/mm3      Basophils, Absolute 0.00 10*3/mm3      nRBC 0.0 /100 WBC     Lactic Acid, Plasma [[id number]]  (Abnormal) Collected: 12/31/23 0542    Specimen: Blood Updated: 12/31/23 0707     Lactate 2.5 mmol/L     Prolactin [[id number]] Collected: 12/30/23 2246    Specimen: Blood Updated: 12/30/23 2249           Imaging Results (Last 24 Hours)       Procedure Component Value Units Date/Time    CT  Head Without Contrast [[id number]] Collected: 12/31/23 0406     Updated: 12/31/23 0410    Narrative:      CT HEAD WO CONTRAST    Date of Exam: 12/31/2023 4:00 AM EST    Indication: Seizure disorder, clinical change.    Comparison: 12/30/2023.    Technique: Axial CT images were obtained of the head without contrast administration.  Coronal reconstructions were performed.  Automated exposure control and iterative reconstruction methods were used.      Findings:  There is redemonstration of L3-4 mm small subdural hematoma along the left side of the posterior falx. There are stable chronic infarcts in the right parietal lobe, left frontal lobe and left temporal lobe. There is no new hypoattenuating lesion in the   brain. No new hemorrhage. Stable findings of prior right-sided craniectomy with cranioplasty device in place. No mass effect or midline shift. Orbits appear unremarkable. The paranasal sinuses and mastoids appear clear. The calvarium appears intact.   Superficial soft tissues are unremarkable.      Impression:      Impression:  1.Stable 4 mm subdural hematoma along the left side of the posterior falx.  2.Stable chronic infarcts in the right parietal lobe, left frontal lobe and left temporal lobe.  3.Stable changes of prior right-sided craniectomy and cranioplasty.        Electronically Signed: Jah Jiang MD    12/31/2023 4:08 AM EST    Workstation ID: [id number]    CT Head Without Contrast [[id number]] Collected: 12/30/23 2317     Updated: 12/30/23 2324    Narrative:      CT HEAD WO CONTRAST    Date of Exam: 12/30/2023 10:54 PM EST    Indication: Stroke, follow up  24 Hours Post Thrombolytic Administration.    Comparison: Brain MRI 12/30/2023.    Technique: Axial CT images were obtained of the head without contrast administration.  Coronal reconstructions were performed.  Automated exposure control and iterative reconstruction methods were used.      Findings:  There is a new small posterior left frontal  parafalcine subdural hematoma measuring up to 4 mm thick. No mass effect. There are stable chronic infarcts in the right parietal lobe, left temporal lobe and left inferior frontal lobe. No new hypoattenuating   lesions in the brain. No midline shift or herniation. The brainstem, basal ganglia and cerebellum appear unremarkable. The orbits appear within normal limits. There is evidence of prior right convexity craniectomy with cranioplasty. The remainder of the   calvarium appears intact. Mastoids and paranasal sinuses appear clear. Superficial soft tissues are unremarkable.      Impression:      Impression:  1.New small posterior left frontal parafalcine subdural hematoma without mass effect.  2.Stable chronic infarcts in the right parietal lobe, left temporal lobe and left inferior frontal lobe.  3.Evidence of prior right convexity craniectomy with cranioplasty.        Electronically Signed: Jah Jiang MD    12/30/2023 11:22 PM EST    Workstation ID: [id number]    MRI Brain Without Contrast [[id number]] Collected: 12/30/23 1656     Updated: 12/30/23 1702    Narrative:      MRI BRAIN WO CONTRAST    Date of Exam: 12/30/2023 4:17 PM EST    Indication: Stroke, follow up.     Comparison: None available.    Technique:  Routine multiplanar/multisequence sequence images of the brain were obtained without contrast administration.      Findings:  Exam limited by motion, and full complement of sequences was unable to be obtained. There is no restricted diffusion. There is no evidence of edema or mass effect. There are findings of encephalomalacia in the right parietal, inferior left frontal, and   left temporal lobes. There is no abnormal extra-axial fluid collection. Major intracranial flow voids are present there are postoperative changes of right craniotomy.      Impression:      Impression:  Limited exam as described. There is no evidence of acute brain ischemia. There is multifocal encephalomalacia involving the right  "parietal, left frontal, and left temporal lobes        Electronically Signed: Afshin Schwartz    12/30/2023 5:00 PM EST    Workstation ID: [id number]    XR Abdomen KUB [[id number]] Collected: 12/30/23 1254     Updated: 12/30/23 1259    Narrative:      XR ABDOMEN KUB    Date of Exam: 12/30/2023 12:51 PM EST    Indication: MRI compatiablilty    Comparison: None available.    Findings:  Abdomen appears somewhat \"gassy\", but no abnormally dilated bowel loops are seen. There are monitoring leads, but no evidence of metallic device or foreign body in the abdomen or pelvis that would preclude an MRI. The hemidiaphragms are incompletely   included on this exam but yesterday's chest radiograph shows no evidence of metallic foreign body here.      Impression:      Impression:  MRI screening KUB with no evidence of metallic foreign body.      Electronically Signed: Robles Dias MD    12/30/2023 12:56 PM EST    Workstation ID: [id number]               Medication Review:     Assessment & Plan     Stroke mimic -> Seizure with Jonn's Paralysis.      Neeraj Martin is a 38 y.o. male with hx of known traumatic brain injury, and seizures on keppra 1000 mg BID, presenting for acute left sided weakness.  Pt was brought to ER from [facility] and last known normal 12/23/23 at 8 PM.  Pt had witnessed seizure en route for which he received Versed and Narcan.  Pt later developed left sided weakness.   CT head -ve, save old areas of encephalomalacia (right post parietal, left frontal, left temporal) and right hemicraniectomy.         IMPRESSION:   Stroke Mimic with Jonn's Paralysis    TBI with multiple cortical areas of encephalomalacia    H/o localization related seizures 2ndy #2     1.   MRI brain w/o - neg for acute CVA  2.   CT head showed small left parafalcine SDH with no mass effect or midline shift    3.   Increase keppra 1500 mg BID  4.   Pt had been on home VPA.  Increased to IV  q 6 but continues to stare left, with versive head " movements left and still neglecting right side. Start IV Vimpat 200 mg BID  5.   VTE ppx - SCD           Jacinda Canales MD  12/31/23  11:33 EST      Time: 35 CT 20

## 2023-12-31 NOTE — PLAN OF CARE
Goal Outcome Evaluation:  Plan of Care Reviewed With: patient        Progress: no change  Outcome Evaluation: Pt no longer in custody of [facility]. Restraints applied for safety. No neuro changes this shift. VSS.

## 2023-12-31 NOTE — SIGNIFICANT NOTE
12/31/23 1032   OTHER   Discipline occupational therapist   Rehab Time/Intention   Session Not Performed other (see comments)  (RN Hold due to recent findings of  small parafalcine subdural hematoma. Follow up as able.)   Recommendation   OT - Next Appointment 01/02/24

## 2023-12-31 NOTE — CONSULTS
NEUROSURGERY CONSULT      Neeraj Martin  1985  [id number]    Referring Provider: Nanette Sterling APRN  [address] [place] [address]  Reason for Consultation/Chief Complaint: Subdural hematoma    Patient Care Team:  Nanette Sterling APRN as PCP - General (Nurse Practitioner)    Subjective .     History of Present Illness:    Duration: 1 day  Severity: Moderate to severe  Timing: Acute  Associated Symptoms: Jonn's paralysis, seizure  Narrative: Patient has a history of TBI and is currently incarcerated.  Found down after possible seizure-like activity.  Significantly postictal with gradual improvement in left-sided Jonn's paralysis.  Worked up for stroke which was negative.    While in the room and during my examination of the patient I wore a mask and eye protection.  I washed my hands before and after this patient encounter.  The patient was also wearing a mask.    Review of Systems: All 14 systems are reviewed and are negative except for what is documented above in the HPI  Review of Systems    History: I have reviewed and agree with the following documented PMH, PSH, FH and there no additions or changes.  Past Medical History:   Diagnosis Date    Epilepsy     Headache     Hepatitis C virus infection 4/26/2021    Memory dysfunction following head trauma     Methicillin resistant Staphylococcus aureus infection 9/30/2018    Seizures     Stroke     and   Past Surgical History:   Procedure Laterality Date    BRAIN SURGERY Right 2020    HERNIA REPAIR      and The patient has a family history of    Objective     Physical Exam:  CON:  Appears stated age.  Slightly postictal  HEENT:  Atraumatic and normocephalic.  PULM:  Breathing nonlabored on room air  CARDIO:  RRR, Pulses 2+  MSK:  Limbs intact. Musculoskeletal pain is absent  PSYCH:  Appears within normal limits.  SKIN:  No noticeable skin lesions or abrasions  VITALS:  Temp:  [98.3 °F (36.8 °C)-98.7 °F (37.1 °C)] 98.3 °F (36.8 °C)  Heart Rate:  [63-97]  66  Resp:  [11-27] 15  BP: ()/(53-98) 143/90, Body mass index is 27.91 kg/m².  NEURO: AOx3.  Facial asymmetry and dysarthria.  Generalized left-sided 3/5 weakness and numbness.  Slow to mobilize left side.  Follows commands in the right upper extremity and right lower extremity normally.      Results Review: I reviewed the patient's new clinical results.    CT: CT of the head was reviewed and shows stable small parafalcine subdural hematoma without any evidence of mass effect.  Encephalomalacia in the left frontal and temporal lobes.  Evidence of a right-sided peek implant reconstruction.  MR: MRI of the brain w/wo contrast was reviewed and shows no evidence of acute ischemia with diffusion restriction    I personally reviewed the images from the following radiographic studies.    Lab Results (last 24 hours)       Procedure Component Value Units Date/Time    Prolactin [[id number]] Collected: 12/30/23 2246    Specimen: Blood Updated: 12/30/23 2249    Lactic Acid, Plasma [[id number]]  (Abnormal) Collected: 12/31/23 0542    Specimen: Blood Updated: 12/31/23 0707     Lactate 2.5 mmol/L     STAT Lactic Acid, Reflex [[id number]]  (Normal) Collected: 12/31/23 0830    Specimen: Blood Updated: 12/31/23 0904     Lactate 1.6 mmol/L     Comprehensive Metabolic Panel [[id number]]  (Abnormal) Collected: 12/31/23 0830    Specimen: Blood Updated: 12/31/23 0859     Glucose 101 mg/dL      BUN 8 mg/dL      Creatinine 0.66 mg/dL      Sodium 142 mmol/L      Potassium 4.0 mmol/L      Chloride 104 mmol/L      CO2 29.0 mmol/L      Calcium 9.1 mg/dL      Total Protein 7.0 g/dL      Albumin 4.1 g/dL      ALT (SGPT) 9 U/L      AST (SGOT) 13 U/L      Alkaline Phosphatase 45 U/L      Total Bilirubin 0.5 mg/dL      Globulin 2.9 gm/dL      A/G Ratio 1.4 g/dL      BUN/Creatinine Ratio 12.1     Anion Gap 9.0 mmol/L      eGFR 122.4 mL/min/1.73     Narrative:      GFR Normal >60  Chronic Kidney Disease <60  Kidney Failure <15      CBC &  Differential [[id number]]  (Abnormal) Collected: 12/31/23 0830    Specimen: Blood Updated: 12/31/23 0842    Narrative:      The following orders were created for panel order CBC & Differential.  Procedure                               Abnormality         Status                     ---------                               -----------         ------                     CBC Auto Differential[[id number]]        Abnormal            Final result                 Please view results for these tests on the individual orders.    CBC Auto Differential [[id number]]  (Abnormal) Collected: 12/31/23 0830    Specimen: Blood Updated: 12/31/23 0842     WBC 8.90 10*3/mm3      RBC 4.77 10*6/mm3      Hemoglobin 14.5 g/dL      Hematocrit 43.0 %      MCV 90.0 fL      MCH 30.4 pg      MCHC 33.8 g/dL      RDW 15.8 %      RDW-SD 48.6 fl      MPV 9.4 fL      Platelets 180 10*3/mm3      Neutrophil % 66.4 %      Lymphocyte % 21.3 %      Monocyte % 11.7 %      Eosinophil % 0.3 %      Basophil % 0.3 %      Neutrophils, Absolute 5.90 10*3/mm3      Lymphocytes, Absolute 1.90 10*3/mm3      Monocytes, Absolute 1.00 10*3/mm3      Eosinophils, Absolute 0.00 10*3/mm3      Basophils, Absolute 0.00 10*3/mm3      nRBC 0.0 /100 WBC             Assessment & Plan :     Assessment: Neeraj Martin is a 38 y.o. male who presents with seizure, Jonn's paralysis, subdural hematoma  Additional workup: None  Surgery planned: None  Additional plan: The patient has improving Jonn's paralysis on the left side.  Still has some facial asymmetry and left-sided weakness and numbness but it is gradually resolving.  His head CT shows a small parafalcine subdural hematoma which is stable compared to his MRI from 12 hours prior.  No plan for any neurosurgical intervention at this time.  Please follow-up with Dr. Cobb in 4 weeks with another head CT.      CVA (cerebral vascular accident)      I discussed the patient's findings and my recommendations with patient and nursing  staff    To Echeverria MD  12/31/23  09:10 EST

## 2023-12-31 NOTE — SIGNIFICANT NOTE
12/31/23 1250   OTHER   Discipline physical therapist   Rehab Time/Intention   Session Not Performed other (see comments)  (RN Hold due to recent findings of  small parafalcine subdural hematoma. Follow up as able.)   Recommendation   PT - Next Appointment 01/02/24

## 2023-12-31 NOTE — NURSING NOTE
Call placed to Dr. Cobb, Dr Dao returned call for Neurosurgery consult. Aware of subdural hematoma and current seizure activity. No new orders, just monitor at this time.

## 2023-12-31 NOTE — PLAN OF CARE
Goal Outcome Evaluation:  Plan of Care Reviewed With: patient        Progress: declining- Pt continues to have seizures even with neurology starting depakote and increasing keppra.

## 2023-12-31 NOTE — NURSING NOTE
Call placed to Dr Canales to notify of seizure. N.O. received and noted to increase depakote, add ativan, which is on shortage so order changed to versed prn for seizure activity per LEIGHA Soto, and a neurosurgery consult.

## 2023-12-31 NOTE — NURSING NOTE
Pt restless in bed, while at bedside this nurse noted him staring at the left and not speaking. Within a few minutes he noted to have jerking in all extremities. Pt awake but not responding. This nurse summoned for other staff to come and assist. They arrived and seizure had stopped. Call placed to Intensivist. Order received for ativan 1mg stat. Medication administered. Review of earlier CT scanned noted an acute subdural hematoma. Pt was taken to CT earlier for 24-hr post TNK CT. Stat CT ordered and obtained which showed a stable subdural. Jacinda GARCIA aware.

## 2024-01-01 LAB
ALBUMIN SERPL-MCNC: 4.4 G/DL (ref 3.5–5.2)
ALBUMIN/GLOB SERPL: 1.5 G/DL
ALP SERPL-CCNC: 51 U/L (ref 39–117)
ALT SERPL W P-5'-P-CCNC: 17 U/L (ref 1–41)
ANION GAP SERPL CALCULATED.3IONS-SCNC: 10 MMOL/L (ref 5–15)
AST SERPL-CCNC: 18 U/L (ref 1–40)
BASOPHILS # BLD AUTO: 0.1 10*3/MM3 (ref 0–0.2)
BASOPHILS NFR BLD AUTO: 0.6 % (ref 0–1.5)
BILIRUB SERPL-MCNC: 0.9 MG/DL (ref 0–1.2)
BUN SERPL-MCNC: 9 MG/DL (ref 6–20)
BUN/CREAT SERPL: 13 (ref 7–25)
CALCIUM SPEC-SCNC: 9.4 MG/DL (ref 8.6–10.5)
CHLORIDE SERPL-SCNC: 101 MMOL/L (ref 98–107)
CO2 SERPL-SCNC: 29 MMOL/L (ref 22–29)
CREAT SERPL-MCNC: 0.69 MG/DL (ref 0.76–1.27)
DEPRECATED RDW RBC AUTO: 48.6 FL (ref 37–54)
EGFRCR SERPLBLD CKD-EPI 2021: 120.7 ML/MIN/1.73
EOSINOPHIL # BLD AUTO: 0.2 10*3/MM3 (ref 0–0.4)
EOSINOPHIL NFR BLD AUTO: 1.7 % (ref 0.3–6.2)
ERYTHROCYTE [DISTWIDTH] IN BLOOD BY AUTOMATED COUNT: 15.7 % (ref 12.3–15.4)
GLOBULIN UR ELPH-MCNC: 3 GM/DL
GLUCOSE SERPL-MCNC: 84 MG/DL (ref 65–99)
HCT VFR BLD AUTO: 48.7 % (ref 37.5–51)
HGB BLD-MCNC: 16.2 G/DL (ref 13–17.7)
LYMPHOCYTES # BLD AUTO: 2.4 10*3/MM3 (ref 0.7–3.1)
LYMPHOCYTES NFR BLD AUTO: 23.6 % (ref 19.6–45.3)
MCH RBC QN AUTO: 29.7 PG (ref 26.6–33)
MCHC RBC AUTO-ENTMCNC: 33.2 G/DL (ref 31.5–35.7)
MCV RBC AUTO: 89.4 FL (ref 79–97)
MONOCYTES # BLD AUTO: 1 10*3/MM3 (ref 0.1–0.9)
MONOCYTES NFR BLD AUTO: 9.7 % (ref 5–12)
NEUTROPHILS NFR BLD AUTO: 6.5 10*3/MM3 (ref 1.7–7)
NEUTROPHILS NFR BLD AUTO: 64.4 % (ref 42.7–76)
NRBC BLD AUTO-RTO: 0.1 /100 WBC (ref 0–0.2)
PLATELET # BLD AUTO: 187 10*3/MM3 (ref 140–450)
PMV BLD AUTO: 9.8 FL (ref 6–12)
POTASSIUM SERPL-SCNC: 3.6 MMOL/L (ref 3.5–5.2)
PROT SERPL-MCNC: 7.4 G/DL (ref 6–8.5)
RBC # BLD AUTO: 5.45 10*6/MM3 (ref 4.14–5.8)
SODIUM SERPL-SCNC: 140 MMOL/L (ref 136–145)
WBC NRBC COR # BLD AUTO: 10.1 10*3/MM3 (ref 3.4–10.8)

## 2024-01-01 PROCEDURE — 25010000002 LEVETRIRACETAM PER 10 MG: Performed by: PSYCHIATRY & NEUROLOGY

## 2024-01-01 PROCEDURE — 25010000002 LACOSAMIDE 200 MG/20ML SOLUTION: Performed by: PSYCHIATRY & NEUROLOGY

## 2024-01-01 PROCEDURE — 25010000002 MIDAZOLAM PER 1 MG: Performed by: NURSE PRACTITIONER

## 2024-01-01 PROCEDURE — C9254 INJECTION, LACOSAMIDE: HCPCS | Performed by: PSYCHIATRY & NEUROLOGY

## 2024-01-01 PROCEDURE — 99232 SBSQ HOSP IP/OBS MODERATE 35: CPT | Performed by: PSYCHIATRY & NEUROLOGY

## 2024-01-01 PROCEDURE — 80053 COMPREHEN METABOLIC PANEL: CPT | Performed by: NURSE PRACTITIONER

## 2024-01-01 PROCEDURE — 25010000002 THIAMINE HCL 200 MG/2ML SOLUTION: Performed by: NURSE PRACTITIONER

## 2024-01-01 PROCEDURE — 85025 COMPLETE CBC W/AUTO DIFF WBC: CPT | Performed by: NURSE PRACTITIONER

## 2024-01-01 RX ORDER — CHOLECALCIFEROL (VITAMIN D3) 125 MCG
5 CAPSULE ORAL NIGHTLY
Status: DISCONTINUED | OUTPATIENT
Start: 2024-01-01 | End: 2024-01-04 | Stop reason: HOSPADM

## 2024-01-01 RX ORDER — NICOTINE 21 MG/24HR
1 PATCH, TRANSDERMAL 24 HOURS TRANSDERMAL
Status: DISCONTINUED | OUTPATIENT
Start: 2024-01-01 | End: 2024-01-04 | Stop reason: HOSPADM

## 2024-01-01 RX ADMIN — Medication 1 PATCH: at 11:50

## 2024-01-01 RX ADMIN — VALPROIC ACID 750 MG: 250 SOLUTION ORAL at 07:57

## 2024-01-01 RX ADMIN — MIDAZOLAM 4 MG: 1 INJECTION INTRAMUSCULAR; INTRAVENOUS at 11:55

## 2024-01-01 RX ADMIN — THIAMINE HYDROCHLORIDE 200 MG: 100 INJECTION, SOLUTION INTRAMUSCULAR; INTRAVENOUS at 00:08

## 2024-01-01 RX ADMIN — LORAZEPAM 2 MG: 1 TABLET ORAL at 20:52

## 2024-01-01 RX ADMIN — MIDAZOLAM 2 MG: 1 INJECTION INTRAMUSCULAR; INTRAVENOUS at 02:54

## 2024-01-01 RX ADMIN — ATORVASTATIN CALCIUM 80 MG: 40 TABLET, FILM COATED ORAL at 20:52

## 2024-01-01 RX ADMIN — MIDAZOLAM 2 MG: 1 INJECTION INTRAMUSCULAR; INTRAVENOUS at 06:23

## 2024-01-01 RX ADMIN — MIDAZOLAM 4 MG: 1 INJECTION INTRAMUSCULAR; INTRAVENOUS at 16:56

## 2024-01-01 RX ADMIN — FOLIC ACID 1 MG: 1 TABLET ORAL at 07:57

## 2024-01-01 RX ADMIN — MIDAZOLAM 2 MG: 1 INJECTION INTRAMUSCULAR; INTRAVENOUS at 22:50

## 2024-01-01 RX ADMIN — MIDAZOLAM 2 MG: 1 INJECTION INTRAMUSCULAR; INTRAVENOUS at 01:32

## 2024-01-01 RX ADMIN — Medication 5 MG: at 20:52

## 2024-01-01 RX ADMIN — THIAMINE HYDROCHLORIDE 200 MG: 100 INJECTION, SOLUTION INTRAMUSCULAR; INTRAVENOUS at 08:52

## 2024-01-01 RX ADMIN — LEVOCARNITINE 500 MG: 1 SOLUTION ORAL at 16:57

## 2024-01-01 RX ADMIN — MIDAZOLAM 2 MG: 1 INJECTION INTRAMUSCULAR; INTRAVENOUS at 00:32

## 2024-01-01 RX ADMIN — MIDAZOLAM 2 MG: 1 INJECTION INTRAMUSCULAR; INTRAVENOUS at 04:48

## 2024-01-01 RX ADMIN — MIDAZOLAM 4 MG: 1 INJECTION INTRAMUSCULAR; INTRAVENOUS at 13:30

## 2024-01-01 RX ADMIN — Medication 10 ML: at 21:15

## 2024-01-01 RX ADMIN — LACOSAMIDE 200 MG: 10 INJECTION, SOLUTION INTRAVENOUS at 16:57

## 2024-01-01 RX ADMIN — VALPROIC ACID 750 MG: 250 SOLUTION ORAL at 13:29

## 2024-01-01 RX ADMIN — THIAMINE HYDROCHLORIDE 200 MG: 100 INJECTION, SOLUTION INTRAMUSCULAR; INTRAVENOUS at 21:21

## 2024-01-01 RX ADMIN — Medication 10 ML: at 07:58

## 2024-01-01 RX ADMIN — Medication 2000 UNITS: at 07:57

## 2024-01-01 RX ADMIN — LEVETIRACETAM 1500 MG: 100 INJECTION, SOLUTION INTRAVENOUS at 07:58

## 2024-01-01 RX ADMIN — LACOSAMIDE 200 MG: 10 INJECTION, SOLUTION INTRAVENOUS at 04:48

## 2024-01-01 RX ADMIN — VALPROIC ACID 750 MG: 250 SOLUTION ORAL at 20:52

## 2024-01-01 RX ADMIN — THIAMINE HYDROCHLORIDE 200 MG: 100 INJECTION, SOLUTION INTRAMUSCULAR; INTRAVENOUS at 13:30

## 2024-01-01 RX ADMIN — LEVETIRACETAM 1500 MG: 100 INJECTION, SOLUTION INTRAVENOUS at 20:51

## 2024-01-01 NOTE — PLAN OF CARE
Problem: Skin Injury Risk Increased  Goal: Skin Health and Integrity  Outcome: Ongoing, Progressing  Intervention: Optimize Skin Protection  Recent Flowsheet Documentation  Taken 1/1/2024 0500 by Fantasma Coronel RN  Head of Bed ([facility]) Positioning: HOB at 30-45 degrees  Taken 1/1/2024 0400 by Fantasma Coronel RN  Pressure Reduction Techniques: frequent weight shift encouraged  Pressure Reduction Devices: pressure-redistributing mattress utilized  Skin Protection:   adhesive use limited   tubing/devices free from skin contact  Taken 1/1/2024 0300 by Fantasma Coronel RN  Head of Bed ([facility]) Positioning: HOB at 30-45 degrees  Taken 1/1/2024 0100 by Fantasma Coronel RN  Head of Bed ([facility]) Positioning: HOB at 30-45 degrees  Taken 1/1/2024 0000 by Fantasma Coronel RN  Pressure Reduction Techniques:   frequent weight shift encouraged   heels elevated off bed   positioned off wounds   pressure points protected   weight shift assistance provided  Pressure Reduction Devices:   pressure-redistributing mattress utilized   positioning supports utilized   heel offloading device utilized  Skin Protection:   adhesive use limited   incontinence pads utilized   tubing/devices free from skin contact   transparent dressing maintained   skin-to-skin areas padded   skin-to-device areas padded   skin sealant/moisture barrier applied   silicone foam dressing in place   pulse oximeter probe site changed  Taken 12/31/2023 2300 by Fantasma Coronel RN  Head of Bed ([facility]) Positioning: HOB at 30-45 degrees  Taken 12/31/2023 2100 by Fantasma Coronel RN  Head of Bed ([facility]) Positioning: HOB elevated  Taken 12/31/2023 2000 by Fantasma Coronel RN  Pressure Reduction Techniques: weight shift assistance provided  Pressure Reduction Devices:   pressure-redistributing mattress utilized   positioning supports utilized   heel offloading device utilized  Skin Protection:   adhesive use limited   tubing/devices free from skin contact    transparent dressing maintained  Taken 12/31/2023 1900 by Fantasma Coronel RN  Head of Bed ([facility]) Positioning: HOB elevated     Problem: Pain Acute  Goal: Acceptable Pain Control and Functional Ability  Outcome: Ongoing, Progressing  Intervention: Prevent or Manage Pain  Recent Flowsheet Documentation  Taken 1/1/2024 0400 by Fantasma Coronel RN  Bowel Elimination Promotion: adequate fluid intake promoted  Intervention: Develop Pain Management Plan  Recent Flowsheet Documentation  Taken 1/1/2024 0400 by Fantasma Coronel RN  Pain Management Interventions:   care clustered   pillow support provided   position adjusted   quiet environment facilitated  Taken 1/1/2024 0000 by Fantasma Coronel RN  Pain Management Interventions:   care clustered   quiet environment facilitated   see MAR   pillow support provided   position adjusted  Taken 12/31/2023 2000 by Fantasma Coronel RN  Pain Management Interventions:   care clustered   pillow support provided   position adjusted  Intervention: Optimize Psychosocial Wellbeing  Recent Flowsheet Documentation  Taken 1/1/2024 0600 by Fantasma Coronel RN  Diversional Activities: television  Taken 1/1/2024 0400 by Fantasma Coronel RN  Diversional Activities: television  Taken 1/1/2024 0200 by Fantasma Coronel RN  Diversional Activities: television  Taken 1/1/2024 0000 by Fantasma Coronel RN  Diversional Activities: television  Taken 12/31/2023 2200 by Fantasma Coronel RN  Diversional Activities: television  Taken 12/31/2023 2000 by Fantasma Coronel RN  Diversional Activities: television     Problem: Fall Injury Risk  Goal: Absence of Fall and Fall-Related Injury  Outcome: Ongoing, Progressing  Intervention: Promote Injury-Free Environment  Recent Flowsheet Documentation  Taken 1/1/2024 0600 by Fantasma Coronel RN  Safety Promotion/Fall Prevention:   safety round/check completed   room organization consistent   nonskid shoes/slippers when out of bed   clutter free  environment maintained   assistive device/personal items within reach   fall prevention program maintained  Taken 1/1/2024 0500 by Fantasma Coronel, RN  Safety Promotion/Fall Prevention:   safety round/check completed   room organization consistent   nonskid shoes/slippers when out of bed   clutter free environment maintained   assistive device/personal items within reach   fall prevention program maintained  Taken 1/1/2024 0400 by Fantasma Coronel, RN  Safety Promotion/Fall Prevention:   safety round/check completed   room organization consistent   nonskid shoes/slippers when out of bed   clutter free environment maintained   assistive device/personal items within reach   fall prevention program maintained  Taken 1/1/2024 0300 by Fantasma Coronel, RN  Safety Promotion/Fall Prevention:   safety round/check completed   room organization consistent   nonskid shoes/slippers when out of bed   clutter free environment maintained   assistive device/personal items within reach   fall prevention program maintained  Taken 1/1/2024 0200 by Fantasma Coronel, RN  Safety Promotion/Fall Prevention:   safety round/check completed   room organization consistent   nonskid shoes/slippers when out of bed   clutter free environment maintained   assistive device/personal items within reach   fall prevention program maintained  Taken 1/1/2024 0100 by Fantasma Coronel, RN  Safety Promotion/Fall Prevention:   safety round/check completed   room organization consistent   nonskid shoes/slippers when out of bed   clutter free environment maintained   assistive device/personal items within reach   fall prevention program maintained  Taken 1/1/2024 0000 by Fantasma Coronel, RN  Safety Promotion/Fall Prevention:   safety round/check completed   room organization consistent   nonskid shoes/slippers when out of bed   clutter free environment maintained   assistive device/personal items within reach   fall prevention program maintained  Taken  12/31/2023 2300 by Fantasma Coronel RN  Safety Promotion/Fall Prevention:   safety round/check completed   room organization consistent   nonskid shoes/slippers when out of bed   clutter free environment maintained   assistive device/personal items within reach   fall prevention program maintained  Taken 12/31/2023 2200 by Fantasma Coronel RN  Safety Promotion/Fall Prevention:   safety round/check completed   room organization consistent   nonskid shoes/slippers when out of bed   clutter free environment maintained   assistive device/personal items within reach   fall prevention program maintained  Taken 12/31/2023 2100 by Fantasma Coronel RN  Safety Promotion/Fall Prevention:   safety round/check completed   room organization consistent   nonskid shoes/slippers when out of bed   clutter free environment maintained   assistive device/personal items within reach   fall prevention program maintained  Taken 12/31/2023 2000 by Fantasma Coronel RN  Safety Promotion/Fall Prevention:   safety round/check completed   room organization consistent   nonskid shoes/slippers when out of bed   clutter free environment maintained   assistive device/personal items within reach   fall prevention program maintained  Taken 12/31/2023 1900 by Fantasma Coronel RN  Safety Promotion/Fall Prevention:   safety round/check completed   room organization consistent   nonskid shoes/slippers when out of bed   clutter free environment maintained   assistive device/personal items within reach   fall prevention program maintained     Problem: Adult Inpatient Plan of Care  Goal: Plan of Care Review  Outcome: Ongoing, Progressing  Goal: Patient-Specific Goal (Individualized)  Outcome: Ongoing, Progressing  Goal: Absence of Hospital-Acquired Illness or Injury  Outcome: Ongoing, Progressing  Intervention: Identify and Manage Fall Risk  Recent Flowsheet Documentation  Taken 1/1/2024 0600 by Fantasma Coronel RN  Safety Promotion/Fall  Prevention:   safety round/check completed   room organization consistent   nonskid shoes/slippers when out of bed   clutter free environment maintained   assistive device/personal items within reach   fall prevention program maintained  Taken 1/1/2024 0500 by Fantasma Coronel, RN  Safety Promotion/Fall Prevention:   safety round/check completed   room organization consistent   nonskid shoes/slippers when out of bed   clutter free environment maintained   assistive device/personal items within reach   fall prevention program maintained  Taken 1/1/2024 0400 by Fantasma Coronel, RN  Safety Promotion/Fall Prevention:   safety round/check completed   room organization consistent   nonskid shoes/slippers when out of bed   clutter free environment maintained   assistive device/personal items within reach   fall prevention program maintained  Taken 1/1/2024 0300 by Fantasma Coronel, RN  Safety Promotion/Fall Prevention:   safety round/check completed   room organization consistent   nonskid shoes/slippers when out of bed   clutter free environment maintained   assistive device/personal items within reach   fall prevention program maintained  Taken 1/1/2024 0200 by Fantasma Coronel, RN  Safety Promotion/Fall Prevention:   safety round/check completed   room organization consistent   nonskid shoes/slippers when out of bed   clutter free environment maintained   assistive device/personal items within reach   fall prevention program maintained  Taken 1/1/2024 0100 by Fantasma Coronel, RN  Safety Promotion/Fall Prevention:   safety round/check completed   room organization consistent   nonskid shoes/slippers when out of bed   clutter free environment maintained   assistive device/personal items within reach   fall prevention program maintained  Taken 1/1/2024 0000 by Fantasma Coronel, RN  Safety Promotion/Fall Prevention:   safety round/check completed   room organization consistent   nonskid shoes/slippers when out of  bed   clutter free environment maintained   assistive device/personal items within reach   fall prevention program maintained  Taken 12/31/2023 2300 by Fantasma Coronel RN  Safety Promotion/Fall Prevention:   safety round/check completed   room organization consistent   nonskid shoes/slippers when out of bed   clutter free environment maintained   assistive device/personal items within reach   fall prevention program maintained  Taken 12/31/2023 2200 by Fantasma Coronel, RN  Safety Promotion/Fall Prevention:   safety round/check completed   room organization consistent   nonskid shoes/slippers when out of bed   clutter free environment maintained   assistive device/personal items within reach   fall prevention program maintained  Taken 12/31/2023 2100 by Fantasma Coronel RN  Safety Promotion/Fall Prevention:   safety round/check completed   room organization consistent   nonskid shoes/slippers when out of bed   clutter free environment maintained   assistive device/personal items within reach   fall prevention program maintained  Taken 12/31/2023 2000 by Fantasma Coronel RN  Safety Promotion/Fall Prevention:   safety round/check completed   room organization consistent   nonskid shoes/slippers when out of bed   clutter free environment maintained   assistive device/personal items within reach   fall prevention program maintained  Taken 12/31/2023 1900 by Fantasma Coronel, RN  Safety Promotion/Fall Prevention:   safety round/check completed   room organization consistent   nonskid shoes/slippers when out of bed   clutter free environment maintained   assistive device/personal items within reach   fall prevention program maintained  Intervention: Prevent Skin Injury  Recent Flowsheet Documentation  Taken 1/1/2024 0500 by Fantasma Coronel, RN  Body Position:   sitting up in bed   supine, legs elevated  Taken 1/1/2024 0400 by Fantasma Coronel, RN  Skin Protection:   adhesive use limited   tubing/devices free  from skin contact  Taken 1/1/2024 0300 by Fantasma Coronel RN  Body Position:   weight shifting   position changed independently  Taken 1/1/2024 0100 by Fantasma Coronel RN  Body Position:   weight shifting   position changed independently  Taken 1/1/2024 0000 by Fantasma Coronel RN  Skin Protection:   adhesive use limited   incontinence pads utilized   tubing/devices free from skin contact   transparent dressing maintained   skin-to-skin areas padded   skin-to-device areas padded   skin sealant/moisture barrier applied   silicone foam dressing in place   pulse oximeter probe site changed  Taken 12/31/2023 2300 by Fantasma Coronel RN  Body Position: position changed independently  Taken 12/31/2023 2100 by Fantasma Coronel RN  Body Position: position changed independently  Taken 12/31/2023 2000 by Fantasma Coronel RN  Skin Protection:   adhesive use limited   tubing/devices free from skin contact   transparent dressing maintained  Taken 12/31/2023 1900 by Fantasma Coronel RN  Body Position:   position changed independently   sitting up in bed  Intervention: Prevent and Manage VTE (Venous Thromboembolism) Risk  Recent Flowsheet Documentation  Taken 1/1/2024 0400 by Fantasma Coronel RN  Activity Management: bedrest  VTE Prevention/Management:   bilateral   sequential compression devices on  Taken 1/1/2024 0000 by Fantasma Coronel RN  Activity Management: bedrest  VTE Prevention/Management:   bilateral   sequential compression devices on  Range of Motion: ROM (range of motion) performed  Taken 12/31/2023 2000 by Fantasma Coronel RN  Activity Management: bedrest  VTE Prevention/Management:   bilateral   sequential compression devices on  Intervention: Prevent Infection  Recent Flowsheet Documentation  Taken 1/1/2024 0400 by Fantasma Coronel RN  Infection Prevention:   environmental surveillance performed   equipment surfaces disinfected   hand hygiene promoted   personal protective equipment  utilized   rest/sleep promoted   single patient room provided  Taken 1/1/2024 0000 by Fantasma Coronel RN  Infection Prevention:   environmental surveillance performed   hand hygiene promoted   equipment surfaces disinfected   rest/sleep promoted   personal protective equipment utilized   single patient room provided  Taken 12/31/2023 2000 by Fantasma Coronel RN  Infection Prevention:   environmental surveillance performed   equipment surfaces disinfected   hand hygiene promoted   personal protective equipment utilized   rest/sleep promoted   single patient room provided  Goal: Optimal Comfort and Wellbeing  Outcome: Ongoing, Progressing  Intervention: Monitor Pain and Promote Comfort  Recent Flowsheet Documentation  Taken 1/1/2024 0400 by Fantasma Coronel RN  Pain Management Interventions:   care clustered   pillow support provided   position adjusted   quiet environment facilitated  Taken 1/1/2024 0000 by Fantasma Coronel RN  Pain Management Interventions:   care clustered   quiet environment facilitated   see MAR   pillow support provided   position adjusted  Taken 12/31/2023 2000 by Fantasma Coronel RN  Pain Management Interventions:   care clustered   pillow support provided   position adjusted  Intervention: Provide Person-Centered Care  Recent Flowsheet Documentation  Taken 1/1/2024 0400 by Fantasma Coronel RN  Trust Relationship/Rapport:   care explained   choices provided   emotional support provided   empathic listening provided   questions answered   questions encouraged   reassurance provided   thoughts/feelings acknowledged  Taken 1/1/2024 0000 by Fantasma Coronel RN  Trust Relationship/Rapport:   care explained   reassurance provided   choices provided   emotional support provided   empathic listening provided   questions answered   questions encouraged  Taken 12/31/2023 2000 by Fantasma Coronel RN  Trust Relationship/Rapport:   care explained   choices provided   emotional support  provided   empathic listening provided   questions answered   questions encouraged   thoughts/feelings acknowledged   reassurance provided  Goal: Readiness for Transition of Care  Outcome: Ongoing, Progressing     Problem: Adjustment to Illness (Stroke, Ischemic/Transient Ischemic Attack)  Goal: Optimal Coping  Outcome: Ongoing, Progressing  Intervention: Support Psychosocial Response to Stroke  Recent Flowsheet Documentation  Taken 12/31/2023 2000 by Fantasma Coronel RN  Family/Support System Care:   involvement promoted   presence promoted     Problem: Bowel Elimination Impaired (Stroke, Ischemic/Transient Ischemic Attack)  Goal: Effective Bowel Elimination  Outcome: Ongoing, Progressing     Problem: Cerebral Tissue Perfusion (Stroke, Ischemic/Transient Ischemic Attack)  Goal: Optimal Cerebral Tissue Perfusion  Outcome: Ongoing, Progressing  Intervention: Protect and Optimize Cerebral Perfusion  Recent Flowsheet Documentation  Taken 1/1/2024 0400 by Fantasma Coronel RN  Cerebral Perfusion Promotion:   blood pressure monitored   normothermia promoted  Taken 1/1/2024 0000 by Fantasma Coronel RN  Fluid/Electrolyte Management: fluids provided  Taken 12/31/2023 2000 by Fantasma Coronel RN  Cerebral Perfusion Promotion:   blood pressure monitored   normothermia promoted     Problem: Cognitive Impairment (Stroke, Ischemic/Transient Ischemic Attack)  Goal: Optimal Cognitive Function  Outcome: Ongoing, Progressing  Intervention: Optimize Cognitive Function  Recent Flowsheet Documentation  Taken 1/1/2024 0400 by Fantasma Coronel RN  Reorientation Measures: clock in view  Taken 1/1/2024 0000 by Fantasma Coronel RN  Reorientation Measures: clock in view  Taken 12/31/2023 2000 by Fantasma Coronel RN  Reorientation Measures: clock in view     Problem: Communication Impairment (Stroke, Ischemic/Transient Ischemic Attack)  Goal: Improved Communication Skills  Outcome: Ongoing, Progressing  Intervention: Optimize  Communication Skills  Recent Flowsheet Documentation  Taken 1/1/2024 0400 by Fantasma Coronel RN  Communication Enhancement Strategies:   call light answered in person   nonverbal strategies used   one-step directions provided   repetition utilized  Taken 1/1/2024 0000 by Fantasma Coronel RN  Communication Enhancement Strategies: extra time allowed for response  Taken 12/31/2023 2000 by Fantasma Coronel RN  Communication Enhancement Strategies: extra time allowed for response     Problem: Functional Ability Impaired (Stroke, Ischemic/Transient Ischemic Attack)  Goal: Optimal Functional Ability  Outcome: Ongoing, Progressing  Intervention: Optimize Functional Ability  Recent Flowsheet Documentation  Taken 1/1/2024 0400 by Fantasma Coronel RN  Activity Management: bedrest  Taken 1/1/2024 0000 by Fantasma Coronel RN  Activity Management: bedrest  Taken 12/31/2023 2000 by Fantasma Coronel RN  Activity Management: bedrest     Problem: Respiratory Compromise (Stroke, Ischemic/Transient Ischemic Attack)  Goal: Effective Oxygenation and Ventilation  Outcome: Ongoing, Progressing  Intervention: Optimize Oxygenation and Ventilation  Recent Flowsheet Documentation  Taken 1/1/2024 0500 by Fantasma Coronel RN  Head of Bed ([facility]) Positioning: HOB at 30-45 degrees  Taken 1/1/2024 0300 by Fantasma Coronel RN  Head of Bed ([facility]) Positioning: HOB at 30-45 degrees  Taken 1/1/2024 0100 by Fantasma Coronel RN  Head of Bed ([facility]) Positioning: HOB at 30-45 degrees  Taken 12/31/2023 2300 by Fantasma Coronel RN  Head of Bed ([facility]) Positioning: HOB at 30-45 degrees  Taken 12/31/2023 2100 by Fantasma Coronel RN  Head of Bed ([facility]) Positioning: HOB elevated  Taken 12/31/2023 1900 by Fantasma Coronel RN  Head of Bed ([facility]) Positioning: HOB elevated     Problem: Sensorimotor Impairment (Stroke, Ischemic/Transient Ischemic Attack)  Goal: Improved Sensorimotor Function  Outcome: Ongoing, Progressing  Intervention: Optimize Range  of Motion, Motor Control and Function  Recent Flowsheet Documentation  Taken 1/1/2024 0500 by Fantasma Coronel RN  Positioning/Transfer Devices:   pillows   in use  Taken 1/1/2024 0300 by Fantasma Coronel RN  Positioning/Transfer Devices:   pillows   in use  Taken 1/1/2024 0100 by Fantasma Coronel RN  Positioning/Transfer Devices:   pillows   in use  Taken 1/1/2024 0000 by Fantasma Coronel RN  Range of Motion: ROM (range of motion) performed  Taken 12/31/2023 2300 by Fantasma Coronel RN  Positioning/Transfer Devices:   pillows   wedge  Taken 12/31/2023 2100 by Fantasma Coronel RN  Positioning/Transfer Devices:   pillows   in use  Taken 12/31/2023 1900 by Fantasma Coronel RN  Positioning/Transfer Devices:   pillows   in use  Intervention: Optimize Sensory and Perceptual Ability  Recent Flowsheet Documentation  Taken 1/1/2024 0400 by Fantasma Coronel RN  Pressure Reduction Techniques: frequent weight shift encouraged  Pressure Reduction Devices: pressure-redistributing mattress utilized  Taken 1/1/2024 0000 by Fantasma Coronel RN  Pressure Reduction Techniques:   frequent weight shift encouraged   heels elevated off bed   positioned off wounds   pressure points protected   weight shift assistance provided  Pressure Reduction Devices:   pressure-redistributing mattress utilized   positioning supports utilized   heel offloading device utilized  Taken 12/31/2023 2000 by Fantasma Coronel RN  Pressure Reduction Techniques: weight shift assistance provided  Pressure Reduction Devices:   pressure-redistributing mattress utilized   positioning supports utilized   heel offloading device utilized     Problem: Swallowing Impairment (Stroke, Ischemic/Transient Ischemic Attack)  Goal: Optimal Eating and Swallowing without Aspiration  Outcome: Ongoing, Progressing  Intervention: Optimize Eating and Swallowing  Recent Flowsheet Documentation  Taken 1/1/2024 0000 by Fantasma Coronel RN  Aspiration Precautions:    awake/alert before oral intake   respiratory status monitored  Swallowing Interventions: Dysphagia: upright position maintained 30 mins after intake  Feeding/Eating Techniques: feeding assistance provided     Problem: Urinary Elimination Impaired (Stroke, Ischemic/Transient Ischemic Attack)  Goal: Effective Urinary Elimination  Outcome: Ongoing, Progressing     Problem: Seizure Disorder Comorbidity  Goal: Maintenance of Seizure Control  Outcome: Ongoing, Progressing  Intervention: Maintain Seizure-Symptom Control  Recent Flowsheet Documentation  Taken 1/1/2024 0000 by Fantasma Coronel RN  Seizure Precautions:   activity supervised   clutter-free environment maintained   emergency equipment at bedside   side rails padded     Problem: Restraint, Nonviolent  Goal: Absence of Harm or Injury  Outcome: Ongoing, Progressing  Intervention: Implement Least Restrictive Safety Strategies  Recent Flowsheet Documentation  Taken 1/1/2024 0600 by Fantasma Coronel RN  Medical Device Protection: tubing secured  Less Restrictive Alternative:   bed alarm in use   calming techniques promoted   counseling provided   emotional support provided   environment adjusted   positive reinforcement provided   safety enhancements provided   security enhancements provided   self-care activities encouraged   sensory stimulation limited   surveillance provided  De-Escalation Techniques:   medication administered   reoriented   verbally redirected  Diversional Activities: television  Taken 1/1/2024 0400 by Fantasma Coronel, RN  Medical Device Protection: tubing secured  Less Restrictive Alternative:   bed alarm in use   calming techniques promoted   counseling provided   emotional support provided   environment adjusted   positive reinforcement provided   safety enhancements provided   security enhancements provided   self-care activities encouraged   sensory stimulation limited   surveillance provided  De-Escalation Techniques:   appropriate  behavior reinforced   quiet time facilitated   reoriented   medication administered  Diversional Activities: television  Taken 1/1/2024 0200 by Fantasma Coronel RN  Medical Device Protection: tubing secured  Less Restrictive Alternative:   bed alarm in use   calming techniques promoted   counseling provided   emotional support provided   environment adjusted   positive reinforcement provided   safety enhancements provided   security enhancements provided   self-care activities encouraged   sensory stimulation limited   surveillance provided  De-Escalation Techniques:   appropriate behavior reinforced   verbally redirected   stimulation decreased   reoriented   medication administered  Diversional Activities: television  Taken 1/1/2024 0000 by Fantasma Coronel RN  Medical Device Protection: tubing secured  Less Restrictive Alternative:   bed alarm in use   calming techniques promoted   counseling provided   emotional support provided   environment adjusted   positive reinforcement provided   safety enhancements provided   security enhancements provided   self-care activities encouraged   sensory stimulation limited   surveillance provided  De-Escalation Techniques:   appropriate behavior reinforced   reoriented  Diversional Activities: television  Taken 12/31/2023 2200 by Fantasma Coronel RN  Medical Device Protection: tubing secured  Less Restrictive Alternative:   bed alarm in use   calming techniques promoted   counseling provided   emotional support provided   environment adjusted   positive reinforcement provided   safety enhancements provided   security enhancements provided   self-care activities encouraged   sensory stimulation limited   surveillance provided  De-Escalation Techniques:   appropriate behavior reinforced   reoriented   medication administered   verbally redirected   stimulation decreased  Diversional Activities: television  Taken 12/31/2023 2000 by Fantasma Coronel RN  Medical Device  Protection: tubing secured  Less Restrictive Alternative:   bed alarm in use   calming techniques promoted   counseling provided   emotional support provided   environment adjusted   positive reinforcement provided   safety enhancements provided   security enhancements provided   self-care activities encouraged   sensory stimulation limited   surveillance provided  De-Escalation Techniques:   reoriented   appropriate behavior reinforced  Diversional Activities: television  Intervention: Protect Dignity, Rights, and Personal Wellbeing  Recent Flowsheet Documentation  Taken 1/1/2024 0400 by Fantasma Coronel RN  Trust Relationship/Rapport:   care explained   choices provided   emotional support provided   empathic listening provided   questions answered   questions encouraged   reassurance provided   thoughts/feelings acknowledged  Taken 1/1/2024 0000 by Fantasma Coronel RN  Trust Relationship/Rapport:   care explained   reassurance provided   choices provided   emotional support provided   empathic listening provided   questions answered   questions encouraged  Taken 12/31/2023 2000 by Fantasma Coronel RN  Trust Relationship/Rapport:   care explained   choices provided   emotional support provided   empathic listening provided   questions answered   questions encouraged   thoughts/feelings acknowledged   reassurance provided  Intervention: Protect Skin and Joint Integrity  Recent Flowsheet Documentation  Taken 1/1/2024 0500 by Fantasma Coronel RN  Body Position:   sitting up in bed   supine, legs elevated  Taken 1/1/2024 0300 by Fantasma Coronel RN  Body Position:   weight shifting   position changed independently  Taken 1/1/2024 0100 by Fantasma Coronel RN  Body Position:   weight shifting   position changed independently  Taken 1/1/2024 0000 by Fantasma Coronel RN  Range of Motion: ROM (range of motion) performed  Taken 12/31/2023 2300 by Fantasma Coronel RN  Body Position: position changed  independently  Taken 12/31/2023 2100 by Fantasma Coronel RN  Body Position: position changed independently  Taken 12/31/2023 1900 by Fantasma Coronel RN  Body Position:   position changed independently   sitting up in bed     Problem: Pain Acute  Goal: Acceptable Pain Control and Functional Ability  Outcome: Ongoing, Progressing  Intervention: Prevent or Manage Pain  Recent Flowsheet Documentation  Taken 1/1/2024 0400 by Fantasma Coronel RN  Bowel Elimination Promotion: adequate fluid intake promoted  Intervention: Develop Pain Management Plan  Recent Flowsheet Documentation  Taken 1/1/2024 0400 by Fantasma Coronel RN  Pain Management Interventions:   care clustered   pillow support provided   position adjusted   quiet environment facilitated  Taken 1/1/2024 0000 by Fantasma Coronel RN  Pain Management Interventions:   care clustered   quiet environment facilitated   see MAR   pillow support provided   position adjusted  Taken 12/31/2023 2000 by Fantasma Coronel RN  Pain Management Interventions:   care clustered   pillow support provided   position adjusted  Intervention: Optimize Psychosocial Wellbeing  Recent Flowsheet Documentation  Taken 1/1/2024 0600 by Fantasma Coronel RN  Diversional Activities: television  Taken 1/1/2024 0400 by Fantasma Coronel RN  Diversional Activities: television  Taken 1/1/2024 0200 by Fantasma Coronel RN  Diversional Activities: television  Taken 1/1/2024 0000 by Fantasma Coronel RN  Diversional Activities: television  Taken 12/31/2023 2200 by Fantasma Coronel RN  Diversional Activities: television  Taken 12/31/2023 2000 by Fantasma Coronel RN  Diversional Activities: television     Problem: Adult Inpatient Plan of Care  Goal: Absence of Hospital-Acquired Illness or Injury  Intervention: Prevent Skin Injury  Recent Flowsheet Documentation  Taken 1/1/2024 0500 by Fantasma Coronel RN  Body Position:   sitting up in bed   supine, legs elevated  Taken 1/1/2024  0400 by Fantasma Coronel RN  Skin Protection:   adhesive use limited   tubing/devices free from skin contact  Taken 1/1/2024 0300 by Fantasma Coronel RN  Body Position:   weight shifting   position changed independently  Taken 1/1/2024 0100 by Fantasma Coronel RN  Body Position:   weight shifting   position changed independently  Taken 1/1/2024 0000 by Fantasma Coronel RN  Skin Protection:   adhesive use limited   incontinence pads utilized   tubing/devices free from skin contact   transparent dressing maintained   skin-to-skin areas padded   skin-to-device areas padded   skin sealant/moisture barrier applied   silicone foam dressing in place   pulse oximeter probe site changed  Taken 12/31/2023 2300 by Fantasma Coronel RN  Body Position: position changed independently  Taken 12/31/2023 2100 by Fantasma Coronel RN  Body Position: position changed independently  Taken 12/31/2023 2000 by Fantasma Coronel RN  Skin Protection:   adhesive use limited   tubing/devices free from skin contact   transparent dressing maintained  Taken 12/31/2023 1900 by Fantasma Coronel RN  Body Position:   position changed independently   sitting up in bed     Problem: Adult Inpatient Plan of Care  Goal: Absence of Hospital-Acquired Illness or Injury  Intervention: Prevent and Manage VTE (Venous Thromboembolism) Risk  Recent Flowsheet Documentation  Taken 1/1/2024 0400 by Fantasma Coronel RN  Activity Management: bedrest  VTE Prevention/Management:   bilateral   sequential compression devices on  Taken 1/1/2024 0000 by Fantasma Coronel RN  Activity Management: bedrest  VTE Prevention/Management:   bilateral   sequential compression devices on  Range of Motion: ROM (range of motion) performed  Taken 12/31/2023 2000 by Fantasma Coronel RN  Activity Management: bedrest  VTE Prevention/Management:   bilateral   sequential compression devices on   Goal Outcome Evaluation:

## 2024-01-01 NOTE — PROGRESS NOTES
The patient remains confused but he can follow commands. Patient has slurred speech which has been present for a while as per his mother. This normally resolves in 3-4 days. The patient denies any pain currently. No chest pain or SOB. The patient has no nausea or vomiting.  The patient appears in NAD currently.     Physical Exam  Vitals reviewed.   Eyes:      Extraocular Movements: Extraocular movements intact.      Pupils: Pupils are equal, round, and reactive to light.   Cardiovascular:      Rate and Rhythm: Normal rate and regular rhythm.   Pulmonary:      Effort: Pulmonary effort is normal.      Breath sounds: Normal breath sounds.   Abdominal:      General: Abdomen is flat.      Palpations: Abdomen is soft.   Musculoskeletal:         General: Normal range of motion.      Left forearm: Swelling present.      Left wrist: Swelling present.      Left hand: Swelling present. Decreased capillary refill.   Skin:     Capillary Refill: Capillary refill takes less than 2 seconds.      Comments: LUE discoloration likely secondary to handcuffs in place due to patient being in police custody.    Neurological:      Mental Status: He is alert. He is disoriented and confused.      Comments: Patient alert to self, disoriented to time, place, and situation.        [facility] Medicine Services  Consult Note     Patient Name: Neeraj Martin  : 1985  MRN: [id number]  Primary Care Physician:  Nanette Sterling APRN  Referring Physician: MARYCARMEN House  Date of admission: 2023  Date and Time of Care: 2023 at 1250     Inpatient Hospitalist Consult  Consult performed by: Corinna Wick APRN  Consult ordered by: Tavon Morgan MD                 Reason for Consult/ Chief Complaint: Medical Management      Consult Requested By: Dr. Morgan Intensivist      Subjective:      History of Present Illness:   Per the documentation by Dr. Morgan, dated 2023, patient is  a 38 y.o. male with PMH of traumatic brain injury, seizures, alcohol abuse, drug abuse presented to the hospital for altered mental status and was admitted with a principal diagnosis of CVA (cerebral vascular accident).  Presumed to be acute stroke versus seizure, loaded with Keppra.  CT head with areas of encephalomalacia in right parietal lobe, left frontotemporal areas.  CTA with no large vessel occlusion.  Neurology was consulted, s/p tenecteplase.  MRI brain with no evidence of acute infarct but did show multifocal encephalomalacia in the right parietal, left frontotemporal lobes.  Subsequently, repeat CT head in 24 hours showed small left frontal parafalcine subdural hematoma without mass effect.  Neurosurgery was consulted and recommended follow-up in office in 4 weeks.      12/31/2023 patient to be transferred to [facility] and care to be taken over by hospitalist service.     Patient was seen and evaluated, patient is currently bilaterally hand cuffed to bed via bilateral upper extremities and lower extremities with  at bedside. No acute distress. Patient is alert and oriented to self only, disoriented to time, place and situation. Patient denies any complaints.      Review of Systems:   Review of Systems   Unable to perform ROS: Mental status change         Personal History:      Medical History        Past Medical History:   Diagnosis Date    Epilepsy      Headache      Hepatitis C virus infection 4/26/2021    Memory dysfunction following head trauma      Methicillin resistant Staphylococcus aureus infection 9/30/2018    Seizures      Stroke              Surgical History         Past Surgical History:   Procedure Laterality Date    BRAIN SURGERY Right 2020    HERNIA REPAIR                Family History: Family history is unknown by patient. Otherwise pertinent FHx was reviewed and not pertinent to current issue.     Social History:  reports that he has been smoking cigarettes. He has a 15.00  pack-year smoking history. He has been exposed to tobacco smoke. His smokeless tobacco use includes chew. He reports current alcohol use. He reports that he does not currently use drugs.     Home Medications:   Cariprazine HCl, OLANZapine, divalproex, levETIRAcetam, and naloxone     Allergies:  No Known Allergies        Objective:      Vital Signs  Temp:  [98.3 °F (36.8 °C)-98.7 °F (37.1 °C)] 98.3 °F (36.8 °C)  Heart Rate:  [61-92] 67  Resp:  [11-25] 17  BP: ()/() 141/93   Body mass index is 27.91 kg/m².     Physical Exam  Physical Exam  Vitals reviewed.   Eyes:      Extraocular Movements: Extraocular movements intact.      Pupils: Pupils are equal, round, and reactive to light.   Cardiovascular:      Rate and Rhythm: Normal rate and regular rhythm.   Pulmonary:      Effort: Pulmonary effort is normal.      Breath sounds: Normal breath sounds.   Abdominal:      General: Abdomen is flat.      Palpations: Abdomen is soft.   Musculoskeletal:         General: Normal range of motion.      Left forearm: Swelling present.      Left wrist: Swelling present.      Left hand: Swelling present. Decreased capillary refill.   Skin:     Capillary Refill: Capillary refill takes less than 2 seconds.      Comments: LUE discoloration likely secondary to handcuffs in place due to patient being in police custody.    Neurological:      Mental Status: He is alert. He is disoriented and confused.      Comments: Patient alert to self, disoriented to time, place, and situation.             Scheduled Meds   [Held by provider] aspirin, 81 mg, Oral, Daily  atorvastatin, 80 mg, Oral, Nightly  cholecalciferol, 2,000 Units, Oral, Daily  [Held by provider] enoxaparin, 40 mg, Subcutaneous, Daily  levETIRAcetam, 1,500 mg, Intravenous, Q12H  sodium chloride, 10 mL, Intravenous, Q12H  valproate, 750 mg, Oral, Q6H        PRN Meds     midazolam    sodium chloride    sodium chloride    sodium chloride   Infusions                   Diagnostic  Data         Results from last 7 days   Lab Units 12/31/23  0830   WBC 10*3/mm3 8.90   HEMOGLOBIN g/dL 14.5   HEMATOCRIT % 43.0   PLATELETS 10*3/mm3 180   GLUCOSE mg/dL 101*   CREATININE mg/dL 0.66*   BUN mg/dL 8   SODIUM mmol/L 142   POTASSIUM mmol/L 4.0   AST (SGOT) U/L 13   ALT (SGPT) U/L 9   ALK PHOS U/L 45   BILIRUBIN mg/dL 0.5   ANION GAP mmol/L 9.0         CT Head Without Contrast     Result Date: 12/31/2023  Impression: 1.Stable 4 mm subdural hematoma along the left side of the posterior falx. 2.Stable chronic infarcts in the right parietal lobe, left frontal lobe and left temporal lobe. 3.Stable changes of prior right-sided craniectomy and cranioplasty. Electronically Signed: Jah Jiang MD  12/31/2023 4:08 AM EST  Workstation ID: [id number]     CT Head Without Contrast     Result Date: 12/30/2023  Impression: 1.New small posterior left frontal parafalcine subdural hematoma without mass effect. 2.Stable chronic infarcts in the right parietal lobe, left temporal lobe and left inferior frontal lobe. 3.Evidence of prior right convexity craniectomy with cranioplasty. Electronically Signed: Jah Jiang MD  12/30/2023 11:22 PM EST  Workstation ID: [id number]     MRI Brain Without Contrast     Result Date: 12/30/2023  Impression: Limited exam as described. There is no evidence of acute brain ischemia. There is multifocal encephalomalacia involving the right parietal, left frontal, and left temporal lobes Electronically Signed: Afshin Schwartz  12/30/2023 5:00 PM EST  Workstation ID: [id number]     XR Abdomen KUB     Result Date: 12/30/2023  Impression: MRI screening KUB with no evidence of metallic foreign body. Electronically Signed: Robles Dias MD  12/30/2023 12:56 PM EST  Workstation ID: [id number]     XR Chest 1 View     Result Date: 12/29/2023  Impression: Hypoinflated lungs with left basilar airspace disease which may relate to atelectasis, aspiration or pneumonia difficult to exclude. Electronically Signed: Johnson  MD Shannan  12/29/2023 11:23 PM EST  Workstation ID: [id number]     CT Angiogram Head w AI Analysis of LVO     Result Date: 12/29/2023  Impression: 1. No central intracranial large vessel occlusion. 2. Patent bilateral carotid arteries. 3. Patent bilateral vertebral arteries. 4. Question of short segment moderate stenosis of left MCA M2 branch. Intracranial venous contamination and suboptimal contrast opacification limits assessment. 5. Areas of suspected chronic encephalomalacia at right posterior parietal lobe, left frontal lobe and left temporal lobe. Electronically Signed: Johnson Campbell MD  12/29/2023 11:08 PM EST  Workstation ID: [id number]     CT Angiogram Neck     Result Date: 12/29/2023  Impression: 1. No central intracranial large vessel occlusion. 2. Patent bilateral carotid arteries. 3. Patent bilateral vertebral arteries. 4. Question of short segment moderate stenosis of left MCA M2 branch. Intracranial venous contamination and suboptimal contrast opacification limits assessment. 5. Areas of suspected chronic encephalomalacia at right posterior parietal lobe, left frontal lobe and left temporal lobe. Electronically Signed: Johnson Campbell MD  12/29/2023 11:08 PM EST  Workstation ID: [id number]     CT CEREBRAL PERFUSION WITH & WITHOUT CONTRAST     Result Date: 12/29/2023  Impression: 1. No core infarct. 2. Area of prolonged transit time in the left frontal lobe which could be artifactual due to volume averaging with the calvarium given positioning. This may also relate to an area of chronic encephalomalacia secondary to remote infarct seen on noncontrast CT. Consider brain MR follow-up if concern for superimposed ischemia at this site. Electronically Signed: Johnson Campbell MD  12/29/2023 10:45 PM EST  Workstation ID: [id number]     CT Head Without Contrast Stroke Protocol     Result Date: 12/29/2023  Impression: 1. No intracranial hemorrhage. 2. Areas of encephalomalacia involving right posterior parietal lobe, left  frontal lobe and left temporal lobe likely sequela of remote infarcts. 3. Postsurgical changes of right sided craniectomy. Electronically Signed: Johnson Campbell MD  12/29/2023 10:27 PM EST  Workstation ID: [id number]         I reviewed the patient's new clinical results.     Assessment/Plan:      Active and Resolved Problems        Active Hospital Problems     Diagnosis   POA    **CVA (cerebral vascular accident) [I63.9]   Yes       Resolved Hospital Problems   No resolved problems to display.         Acute encephalopathy  Suspected stroke versus Jonn's paralysis versus seizures.  Neurology is primary management.      Seizure disorder - no further seizure today.   On Keppra and Depakote.  Neurology following. Vimpat was added.      Left frontal subdural hematoma  Likely a complication of tenecteplase.  Neurosurgery recommended conservative management and follow-up in their office in 4 weeks.     Traumatic brain injury/history of stroke: Hold aspirin at this time with subdural.  Dyslipidemia: Chronic and stable. Continue statins.     Discoloration of left upper extremity  likely secondary to handcuff placement, length and arm position, patient in police custody and noted to have bilateral upper and lower metal cuffs in place  Police unable to change out cuff to provide for more length that would improve movement         DVT - physical only with the patients recent brain bleed    Case was discussed in detail with the patients mother who is present at bedside. The patient will likely need rehab once everything is completed. PT, OT and speech therapy.      Code status is       Code Status and Medical Interventions:   Ordered at: 12/31/23 0757     Code Status (Patient has no pulse and is not breathing):     CPR (Attempt to Resuscitate)     Medical Interventions (Patient has pulse or is breathing):     Full Support

## 2024-01-01 NOTE — PROGRESS NOTES
LOS: 2 days     Subjective     Has been having multiple partial seizures with versive head turning left, staring spells and left sided shaking that generalized yesterday.  Increased seizure meds, and and no longer with head tonically left, or eyes deviated left.     Overnight, he became very agitated, broke free from restraints and tried toescapes.  Possible hx of ETOH abuse.       Objective     Vital Signs  Temp:  [98.4 °F (36.9 °C)-99 °F (37.2 °C)] 99 °F (37.2 °C)  Heart Rate:  [] 71  Resp:  [12-30] 16  BP: (123-163)/() 139/97  Intake & Output (last day)         12/31 0701  01/01 0700 01/01 0701  01/02 0700    P.O. 560 120    I.V. (mL/kg) 0 (0)     Total Intake(mL/kg) 560 (6.5) 120 (1.4)    Urine (mL/kg/hr) 1675 (0.8)     Stool 0     Total Output 1675     Net -1115 +120          Urine Unmeasured Occurrence 2 x     Stool Unmeasured Occurrence 3 x              Physical Exam:       Cognition: very lethargic, somnolent and mumbling.  Alert and oriented to person     Cranial nerves:  II - Pupils bilaterally briskly reactive to light  No new visual field deficits;  III,IV,VI: Intact  V: Normal facial sensations  VII: Intact   VIII: No new hearing changes  IX, X, XI: Normal gag and shoulder shrug;  XII: Tongue is in the midline.     Sensory:  Intact to light touch in all extremities. Neglects left side to DSS     Motor: Still tends to neglect left side.  Left side can exert good power, but appears weaker than right.     Cerebellar: Deferred     Gait and balance: Deferred     Results Review:     I reviewed the patient's new clinical results.    Lab Results (last 24 hours)       Procedure Component Value Units Date/Time    Comprehensive Metabolic Panel [[id number]]  (Abnormal) Collected: 01/01/24 0633    Specimen: Blood Updated: 01/01/24 0734     Glucose 84 mg/dL      BUN 9 mg/dL      Creatinine 0.69 mg/dL      Sodium 140 mmol/L      Potassium 3.6 mmol/L      Chloride 101 mmol/L      CO2 29.0 mmol/L       Calcium 9.4 mg/dL      Total Protein 7.4 g/dL      Albumin 4.4 g/dL      ALT (SGPT) 17 U/L      AST (SGOT) 18 U/L      Alkaline Phosphatase 51 U/L      Total Bilirubin 0.9 mg/dL      Globulin 3.0 gm/dL      A/G Ratio 1.5 g/dL      BUN/Creatinine Ratio 13.0     Anion Gap 10.0 mmol/L      eGFR 120.7 mL/min/1.73     Narrative:      GFR Normal >60  Chronic Kidney Disease <60  Kidney Failure <15      CBC & Differential [[id number]]  (Abnormal) Collected: 01/01/24 0633    Specimen: Blood Updated: 01/01/24 0705    Narrative:      The following orders were created for panel order CBC & Differential.  Procedure                               Abnormality         Status                     ---------                               -----------         ------                     CBC Auto Differential[[id number]]        Abnormal            Final result                 Please view results for these tests on the individual orders.    CBC Auto Differential [[id number]]  (Abnormal) Collected: 01/01/24 0633    Specimen: Blood Updated: 01/01/24 0705     WBC 10.10 10*3/mm3      RBC 5.45 10*6/mm3      Hemoglobin 16.2 g/dL      Hematocrit 48.7 %      MCV 89.4 fL      MCH 29.7 pg      MCHC 33.2 g/dL      RDW 15.7 %      RDW-SD 48.6 fl      MPV 9.8 fL      Platelets 187 10*3/mm3      Neutrophil % 64.4 %      Lymphocyte % 23.6 %      Monocyte % 9.7 %      Eosinophil % 1.7 %      Basophil % 0.6 %      Neutrophils, Absolute 6.50 10*3/mm3      Lymphocytes, Absolute 2.40 10*3/mm3      Monocytes, Absolute 1.00 10*3/mm3      Eosinophils, Absolute 0.20 10*3/mm3      Basophils, Absolute 0.10 10*3/mm3      nRBC 0.1 /100 WBC     Ammonia [[id number]]  (Normal) Collected: 12/31/23 1916    Specimen: Blood Updated: 12/31/23 1941     Ammonia 24 umol/L     Prolactin [[id number]]  (Normal) Collected: 12/30/23 2246    Specimen: Blood Updated: 12/31/23 1737     Prolactin 9.69 ng/mL     Narrative:      Results may be falsely decreased if patient taking Biotin.             Imaging Results (Last 24 Hours)       ** No results found for the last 24 hours. 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      Neeraj Martin is a 38 y.o. male with hx of known traumatic brain injury, and seizures on keppra 1000 mg BID, presenting for acute left sided weakness.  Pt was brought to ER from [facility] and last known normal 12/23/23 at 8 PM.  Pt had witnessed seizure en route for which he received Versed and Narcan.  Pt later developed left sided weakness.   CT head -ve, save old areas of encephalomalacia (right post parietal, left frontal, left temporal) and right hemicraniectomy.  MRI neg for acute stroke.  Repeat CT showed small left parafalcine SDH.        IMPRESSION:   Stroke Mimic with Jonn's Paralysis    TBI with multiple cortical areas of encephalomalacia    H/o localization related seizures 2ndy #2  4.    Small left parafalcine SDH, s/p TNK     PLAN:    1.   IV keppra 1500 mg BID  2.   IV  q 6h, AST 18, ALT 17, NH3 24.   Intermittently check NH3 on VPA        *  Long term recommend considering switching VPA for Lamictal which is a very good AED and also good mood stabilizer  3.   IV Vimpat 200 mg BID  4.   Start IV thiamine/MVI  5.   Minimize sedating meds, opioids, analgesics, pain meds, anticholinergics.  6.   Delirium precautions.   7.   VTE ppx - SCD           Jacinda Canales MD  01/01/24  09:47 EST      Time: 35 CT 20

## 2024-01-01 NOTE — NURSING NOTE
"Pt confused and became agitated and restless at 2158, escaped restraints, attempted to climb out of bed and could not be redirected, or reoriented. Required the assistance of four nurses to return to bed. When asked about alcohol consumption, Pt stated that he drinks \"anything I can get my hands on.\" And when asked about the amount of alcohol he consumes, he stated, \"As much as I can get.\" Hospitalist notified, [facility] protocol initiated.  "

## 2024-01-02 LAB
ALBUMIN SERPL-MCNC: 3.7 G/DL (ref 3.5–5.2)
ALBUMIN/GLOB SERPL: 1.4 G/DL
ALP SERPL-CCNC: 46 U/L (ref 39–117)
ALT SERPL W P-5'-P-CCNC: 11 U/L (ref 1–41)
ANION GAP SERPL CALCULATED.3IONS-SCNC: 8 MMOL/L (ref 5–15)
AST SERPL-CCNC: 12 U/L (ref 1–40)
BASOPHILS # BLD AUTO: 0 10*3/MM3 (ref 0–0.2)
BASOPHILS NFR BLD AUTO: 0.4 % (ref 0–1.5)
BILIRUB SERPL-MCNC: 0.5 MG/DL (ref 0–1.2)
BUN SERPL-MCNC: 12 MG/DL (ref 6–20)
BUN/CREAT SERPL: 14.3 (ref 7–25)
CALCIUM SPEC-SCNC: 9 MG/DL (ref 8.6–10.5)
CHLORIDE SERPL-SCNC: 105 MMOL/L (ref 98–107)
CO2 SERPL-SCNC: 30 MMOL/L (ref 22–29)
CREAT SERPL-MCNC: 0.84 MG/DL (ref 0.76–1.27)
DEPRECATED RDW RBC AUTO: 47.3 FL (ref 37–54)
EGFRCR SERPLBLD CKD-EPI 2021: 113.8 ML/MIN/1.73
EOSINOPHIL # BLD AUTO: 0.3 10*3/MM3 (ref 0–0.4)
EOSINOPHIL NFR BLD AUTO: 3.5 % (ref 0.3–6.2)
ERYTHROCYTE [DISTWIDTH] IN BLOOD BY AUTOMATED COUNT: 15.5 % (ref 12.3–15.4)
GLOBULIN UR ELPH-MCNC: 2.6 GM/DL
GLUCOSE SERPL-MCNC: 86 MG/DL (ref 65–99)
HCT VFR BLD AUTO: 45.6 % (ref 37.5–51)
HGB BLD-MCNC: 15.2 G/DL (ref 13–17.7)
LYMPHOCYTES # BLD AUTO: 2.2 10*3/MM3 (ref 0.7–3.1)
LYMPHOCYTES NFR BLD AUTO: 27 % (ref 19.6–45.3)
MCH RBC QN AUTO: 29.7 PG (ref 26.6–33)
MCHC RBC AUTO-ENTMCNC: 33.3 G/DL (ref 31.5–35.7)
MCV RBC AUTO: 89.2 FL (ref 79–97)
MONOCYTES # BLD AUTO: 0.9 10*3/MM3 (ref 0.1–0.9)
MONOCYTES NFR BLD AUTO: 10.6 % (ref 5–12)
NEUTROPHILS NFR BLD AUTO: 4.8 10*3/MM3 (ref 1.7–7)
NEUTROPHILS NFR BLD AUTO: 58.5 % (ref 42.7–76)
NRBC BLD AUTO-RTO: 0 /100 WBC (ref 0–0.2)
PLATELET # BLD AUTO: 201 10*3/MM3 (ref 140–450)
PMV BLD AUTO: 9.3 FL (ref 6–12)
POTASSIUM SERPL-SCNC: 4 MMOL/L (ref 3.5–5.2)
PROT SERPL-MCNC: 6.3 G/DL (ref 6–8.5)
RBC # BLD AUTO: 5.11 10*6/MM3 (ref 4.14–5.8)
SODIUM SERPL-SCNC: 143 MMOL/L (ref 136–145)
WBC NRBC COR # BLD AUTO: 8.2 10*3/MM3 (ref 3.4–10.8)

## 2024-01-02 PROCEDURE — 80053 COMPREHEN METABOLIC PANEL: CPT | Performed by: NURSE PRACTITIONER

## 2024-01-02 PROCEDURE — C9254 INJECTION, LACOSAMIDE: HCPCS | Performed by: PSYCHIATRY & NEUROLOGY

## 2024-01-02 PROCEDURE — 99222 1ST HOSP IP/OBS MODERATE 55: CPT

## 2024-01-02 PROCEDURE — 25010000002 MIDAZOLAM PER 1 MG: Performed by: NURSE PRACTITIONER

## 2024-01-02 PROCEDURE — 99233 SBSQ HOSP IP/OBS HIGH 50: CPT | Performed by: NURSE PRACTITIONER

## 2024-01-02 PROCEDURE — 97162 PT EVAL MOD COMPLEX 30 MIN: CPT

## 2024-01-02 PROCEDURE — 25010000002 LACOSAMIDE 200 MG/20ML SOLUTION: Performed by: PSYCHIATRY & NEUROLOGY

## 2024-01-02 PROCEDURE — 85025 COMPLETE CBC W/AUTO DIFF WBC: CPT | Performed by: NURSE PRACTITIONER

## 2024-01-02 PROCEDURE — 25010000002 LEVETRIRACETAM PER 10 MG: Performed by: PSYCHIATRY & NEUROLOGY

## 2024-01-02 PROCEDURE — 97167 OT EVAL HIGH COMPLEX 60 MIN: CPT

## 2024-01-02 PROCEDURE — 25010000002 THIAMINE HCL 200 MG/2ML SOLUTION: Performed by: NURSE PRACTITIONER

## 2024-01-02 RX ORDER — MULTIVITAMIN WITH IRON
100 TABLET ORAL DAILY
Status: DISCONTINUED | OUTPATIENT
Start: 2024-01-02 | End: 2024-01-04 | Stop reason: HOSPADM

## 2024-01-02 RX ADMIN — FOLIC ACID 1 MG: 1 TABLET ORAL at 08:46

## 2024-01-02 RX ADMIN — Medication 2000 UNITS: at 08:46

## 2024-01-02 RX ADMIN — MIDAZOLAM 4 MG: 1 INJECTION INTRAMUSCULAR; INTRAVENOUS at 19:26

## 2024-01-02 RX ADMIN — Medication 10 ML: at 20:57

## 2024-01-02 RX ADMIN — VALPROIC ACID 750 MG: 250 SOLUTION ORAL at 20:55

## 2024-01-02 RX ADMIN — LEVETIRACETAM 1500 MG: 100 INJECTION, SOLUTION INTRAVENOUS at 20:56

## 2024-01-02 RX ADMIN — THIAMINE HYDROCHLORIDE 200 MG: 100 INJECTION, SOLUTION INTRAMUSCULAR; INTRAVENOUS at 13:43

## 2024-01-02 RX ADMIN — Medication 100 MG: at 14:01

## 2024-01-02 RX ADMIN — Medication 10 ML: at 07:37

## 2024-01-02 RX ADMIN — LACOSAMIDE 200 MG: 10 INJECTION, SOLUTION INTRAVENOUS at 05:44

## 2024-01-02 RX ADMIN — MIDAZOLAM 2 MG: 1 INJECTION INTRAMUSCULAR; INTRAVENOUS at 03:22

## 2024-01-02 RX ADMIN — LEVOCARNITINE 500 MG: 1 SOLUTION ORAL at 05:44

## 2024-01-02 RX ADMIN — MIDAZOLAM 4 MG: 1 INJECTION INTRAMUSCULAR; INTRAVENOUS at 13:44

## 2024-01-02 RX ADMIN — Medication 1 PATCH: at 08:48

## 2024-01-02 RX ADMIN — LACOSAMIDE 200 MG: 10 INJECTION, SOLUTION INTRAVENOUS at 17:55

## 2024-01-02 RX ADMIN — MIDAZOLAM 2 MG: 1 INJECTION INTRAMUSCULAR; INTRAVENOUS at 10:15

## 2024-01-02 RX ADMIN — THIAMINE HYDROCHLORIDE 200 MG: 100 INJECTION, SOLUTION INTRAMUSCULAR; INTRAVENOUS at 05:44

## 2024-01-02 RX ADMIN — LEVETIRACETAM 1500 MG: 100 INJECTION, SOLUTION INTRAVENOUS at 08:46

## 2024-01-02 RX ADMIN — MIDAZOLAM 4 MG: 1 INJECTION INTRAMUSCULAR; INTRAVENOUS at 17:55

## 2024-01-02 RX ADMIN — Medication 5 MG: at 20:56

## 2024-01-02 RX ADMIN — THIAMINE HYDROCHLORIDE 200 MG: 100 INJECTION, SOLUTION INTRAMUSCULAR; INTRAVENOUS at 23:28

## 2024-01-02 RX ADMIN — VALPROIC ACID 750 MG: 250 SOLUTION ORAL at 08:46

## 2024-01-02 RX ADMIN — VALPROIC ACID 750 MG: 250 SOLUTION ORAL at 03:22

## 2024-01-02 RX ADMIN — ATORVASTATIN CALCIUM 80 MG: 40 TABLET, FILM COATED ORAL at 20:56

## 2024-01-02 RX ADMIN — VALPROIC ACID 750 MG: 250 SOLUTION ORAL at 14:00

## 2024-01-02 RX ADMIN — MIDAZOLAM 4 MG: 1 INJECTION INTRAMUSCULAR; INTRAVENOUS at 23:29

## 2024-01-02 RX ADMIN — MIDAZOLAM 4 MG: 1 INJECTION INTRAMUSCULAR; INTRAVENOUS at 20:56

## 2024-01-02 RX ADMIN — LEVOCARNITINE 500 MG: 1 SOLUTION ORAL at 17:55

## 2024-01-02 NOTE — PLAN OF CARE
Goal Outcome Evaluation:  Plan of Care Reviewed With: patient           Outcome Evaluation: Pt is a 39 yo male admitted 12/30/23 s/p multiple seizures and AMS. CTH (-) ICH.    Pt received TNK 12/29/23 at 22:49. 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  PMHx significant for Hep C, seizure d/o, memory dysfunction following head trauma, PEG (2018).  Pt is only oriented to self during evaluation.  Pt was apperantly in penitentiary, but penitentiary has since relased due to medical issues.  Prior to patient was living with his wife and was independent with ADLs and mobility.  To note, patient's wife is in [facility] now as well, but patient has a supportive mother.  Pt unable to follow commands on this date.  Pt completed bed mobility with MIN-MOD A.  Pt completed sit to stand transfer with MIN-MOD A.  Pt very impulsive and unsafe.  Pt presents with deficits in balance, strength, transfers, ADLs, and increased falls risk indicating need for skilled OT services.  OT recommending SNF prior to returning home.      Anticipated Discharge Disposition (OT): skilled nursing facility

## 2024-01-02 NOTE — PROGRESS NOTES
LOS: 3 days     Chief Complaint:  Seizures       SUBJECTIVE:    History taken from: patient chart RN    Interval History: Neeraj Martin was admitted on 12/29/2023 Neeraj Martin is a 38 y.o. male with hx of known traumatic brain injury, and seizures on keppra 1000 mg BID, presenting for acute left sided weakness.  [facility] and last known normal 12/23/23 at 8 PM.  Pt had witnessed seizure en route for which he received Versed and Narcan.  Pt later developed left sided weakness.   CT head negative, old areas of encephalomalacia (right post parietal, left frontal, left temporal) and right hemicraniectomy.  MRI neg for acute stroke.  Repeat CT showed small left parafalcine SDH.   1/01: Per Dr. Canales, Had been having multiple partial seizures with versive head turning left, staring spells and left sided shaking that generalized on 12/31. Seizure meds were increased and no longer with head tonically left, or eyes deviated left.     Overnight on 12/31, he became very agitated, broke free from restraints and tried toescapes.    - Portions of the above HPI were copied from previous encounters and edited as appropriate.    Pt's mother is at . She states he does not have any hx of alcohol abuse and he rarely drinks. He has used illicit drugs in the past, but was on probation and had to take drug screens so he was not using. He did, however, attempt suicide a few weeks ago and took several substances including meth at that time. Because of this, he failed his most recent drug screen and was arrested. He had been in prison for 2 weeks prior to his admission so he was not drinking. His mother is concerned that he may have not been getting his Keppra and Depakote at the prison, but she is not sure.     His mother states that he has been hospitalized 3 other times this year for seizures with similar course. He took nearly a month to recover last time and continued having hallucinations for several weeks. She  is concerned the Keppra is contributing to his behavior changes over the past several weeks.     Patient Complaints: Pt is currently sleeping. He received Versed after he became agitated this morning.       Review of Systems   Unable to perform ROS: Other      Unable to obtain    Pertinent PMH:  has a past medical history of Epilepsy, Headache, Hepatitis C virus infection (4/26/2021), Memory dysfunction following head trauma, Methicillin resistant Staphylococcus aureus infection (9/30/2018), Seizures, and Stroke.   ________________________________________________     OBJECTIVE:  Pt is sleeping, snoring slightly. Mother at  requests to let pt sleep.   VSS  No respiratory distress  ___________________________________________   RESULTS REVIEW    VITAL SIGNS:  Temp:  [97.6 °F (36.4 °C)-98.4 °F (36.9 °C)] 98.4 °F (36.9 °C)  Heart Rate:  [] 100  Resp:  [12-16] 16  BP: ()/(42-91) 126/64    LABS:       Lab 01/01/24  0633 12/31/23  0830 12/31/23  0542 12/29/23  2210   WBC 10.10 8.90  --  6.40   HEMOGLOBIN 16.2 14.5  --  14.2   HEMATOCRIT 48.7 43.0  --  43.6   PLATELETS 187 180  --  202   NEUTROS ABS 6.50 5.90  --  3.40   LYMPHS ABS 2.40 1.90  --  2.30   MONOS ABS 1.00* 1.00*  --  0.50   EOS ABS 0.20 0.00  --  0.20   MCV 89.4 90.0  --  89.3   LACTATE  --  1.6 2.5*  --    PROTIME  --   --   --  11.5   APTT  --   --   --  25.1         Lab 01/01/24  0633 12/31/23  0830 12/29/23  2323 12/29/23  2236   SODIUM 140 142  --  140   POTASSIUM 3.6 4.0  --  4.4   CHLORIDE 101 104  --  104   CO2 29.0 29.0  --  28.0   ANION GAP 10.0 9.0  --  8.0   BUN 9 8  --  13   CREATININE 0.69* 0.66*  --  0.82   EGFR 120.7 122.4  --  114.6   GLUCOSE 84 101*  --  116*   CALCIUM 9.4 9.1  --  8.4*   HEMOGLOBIN A1C  --   --  5.60  --          Lab 01/01/24  0633 12/31/23  0830 12/29/23  2236   TOTAL PROTEIN 7.4 7.0 6.0   ALBUMIN 4.4 4.1 3.8   GLOBULIN 3.0 2.9 2.2   ALT (SGPT) 17 9 19   AST (SGOT) 18 13 16   BILIRUBIN 0.9 0.5 <0.2   ALK PHOS 51  45 40         Lab 12/30/23  0649 12/29/23  2210   HSTROP T 8  --    PROTIME  --  11.5   INR  --  1.06         Lab 12/29/23  2323   CHOLESTEROL 182   LDL CHOL 121*   HDL CHOL 47   TRIGLYCERIDES 77         Lab 12/29/23  2236   ABO TYPING O   RH TYPING Positive   ANTIBODY SCREEN Negative         UA          12/30/2023    04:58   Urinalysis   Specific Ismay, UA 1.054    Ketones, UA Negative    Blood, UA Negative    Leukocytes, UA Negative    Nitrite, UA Negative        Lab Results   Component Value Date    TSH 0.946 08/29/2023     (H) 12/29/2023    HGBA1C 5.60 12/29/2023         IMAGING STUDIES:  No radiology results for the last day    I reviewed the patient's new clinical results.    ________________________________________________      PROBLEM LIST:    CVA (cerebral vascular accident)        ASSESSMENT/PLAN:  1. Seizures, stroke mimic with Jonn's paralysis. He has had at least 3 previous hospital admissions this year due to seizures. Family denies alcohol use and states he was in [facility] for 2 weeks until he came to the ED. It is noted that he attempted suicide several weeks ago and reportedly ingested multiple substances, including meth. However, family states, to their knowledge, he has not been using drugs or alcohol otherwise. His mother is concerned he was not receiving his seizure meds at the [facility].   2. TBI with multiple cortical areas of encephalomalacia  3. H/o localization related seizures 2ndy #2  4. Small left parafalcine SDH, s/p TNK--stable     PLAN:     1.   IV keppra 1500 mg BID, add Vitamin B6 100mg daily for agitation   2.   IV  q 6h, AST 18, ALT 17, NH3 24.   Intermittently check NH3 on VPA        *  Long term recommend considering switching VPA for Lamictal which is a very good AED and also good mood stabilizer  3.   IV Vimpat 200 mg BID  4.   Start IV thiamine/MVI  5.   Minimize sedating meds, opioids, analgesics, pain meds, anticholinergics.  6.   Delirium precautions.   7.   VTE ppx  - SCD   8.   Mother has reported agitation at home which could be due to Keppra. Could consider an alternative, but will defer any changes at this time due to risk of status. Discussed recommendations with pt's mother and she will f/u with his neurologist. Will try Vit B6 for now.    **Please refer to previous notes for further details and recommendations.     I discussed the patient's findings and my recommendations with patient, nursing staff, and consulting provider    MARYCARMEN Martinez  01/02/24  10:01 EST

## 2024-01-02 NOTE — CONSULTS
Diabetes Education    Patient Name:  Neeraj Martin  YOB: 1985  MRN: [id number]  Admit Date:  12/29/2023    Consult received per stroke protocol being ordered. Pt does not have hx of diabetes per problem list. Pt does not take diabetes medication at home and is not receiving diabetes medication in hospital. A1c this adm 5.6%. Diabetes education not needed at this time.       Electronically signed by:  Zeynep Albright RN  01/02/24 09:14 EST

## 2024-01-02 NOTE — PROGRESS NOTES
The patient feels well currently. The patients mother is present at bedside today. No nausea or vomiting. No light headedness or dizziness. Patient still has ongoing confusion. No further episodes of seizures over the night. The patient is able to follow commands but does so slowly. The mother states that he is not back to baseline. Tolerating his diet without issue. Discussed with nursing staff. No other changes overnight.     Physical Exam  Vitals reviewed.   Eyes:      Extraocular Movements: Extraocular movements intact.      Pupils: Pupils are equal, round, and reactive to light.   Cardiovascular:      Rate and Rhythm: Normal rate and regular rhythm.   Pulmonary:      Effort: Pulmonary effort is normal.      Breath sounds: Normal breath sounds.   Abdominal:      General: Abdomen is flat.      Palpations: Abdomen is soft.   Musculoskeletal:         General: Normal range of motion.      Left forearm: Swelling present.      Left wrist: Swelling present.      Left hand: Swelling present. Decreased capillary refill.   Skin:     Capillary Refill: Capillary refill takes less than 2 seconds.      Comments: LUE discoloration likely secondary to handcuffs in place due to patient being in police custody.    Neurological:      Mental Status: He is alert. He is disoriented and confused.      Comments: Patient alert to self, disoriented to time, place, and situation.        CT Head Without Contrast     Result Date: 12/31/2023  Impression: 1.Stable 4 mm subdural hematoma along the left side of the posterior falx. 2.Stable chronic infarcts in the right parietal lobe, left frontal lobe and left temporal lobe. 3.Stable changes of prior right-sided craniectomy and cranioplasty. Electronically Signed: Jah Jiang MD  12/31/2023 4:08 AM EST  Workstation ID: [id number]     CT Head Without Contrast     Result Date: 12/30/2023  Impression: 1.New small posterior left frontal parafalcine subdural hematoma without mass effect. 2.Stable  chronic infarcts in the right parietal lobe, left temporal lobe and left inferior frontal lobe. 3.Evidence of prior right convexity craniectomy with cranioplasty. Electronically Signed: Jah Jiang MD  12/30/2023 11:22 PM EST  Workstation ID: [id number]     MRI Brain Without Contrast     Result Date: 12/30/2023  Impression: Limited exam as described. There is no evidence of acute brain ischemia. There is multifocal encephalomalacia involving the right parietal, left frontal, and left temporal lobes Electronically Signed: Afshin Schwartz  12/30/2023 5:00 PM EST  Workstation ID: [id number]     XR Abdomen KUB     Result Date: 12/30/2023  Impression: MRI screening KUB with no evidence of metallic foreign body. Electronically Signed: Robles Dias MD  12/30/2023 12:56 PM EST  Workstation ID: [id number]     XR Chest 1 View     Result Date: 12/29/2023  Impression: Hypoinflated lungs with left basilar airspace disease which may relate to atelectasis, aspiration or pneumonia difficult to exclude. Electronically Signed: Johnson Campbell MD  12/29/2023 11:23 PM EST  Workstation ID: [id number]     CT Angiogram Head w AI Analysis of LVO     Result Date: 12/29/2023  Impression: 1. No central intracranial large vessel occlusion. 2. Patent bilateral carotid arteries. 3. Patent bilateral vertebral arteries. 4. Question of short segment moderate stenosis of left MCA M2 branch. Intracranial venous contamination and suboptimal contrast opacification limits assessment. 5. Areas of suspected chronic encephalomalacia at right posterior parietal lobe, left frontal lobe and left temporal lobe. Electronically Signed: Johnson Campbell MD  12/29/2023 11:08 PM EST  Workstation ID: [id number]     CT Angiogram Neck     Result Date: 12/29/2023  Impression: 1. No central intracranial large vessel occlusion. 2. Patent bilateral carotid arteries. 3. Patent bilateral vertebral arteries. 4. Question of short segment moderate stenosis of left MCA M2 branch. Intracranial  venous contamination and suboptimal contrast opacification limits assessment. 5. Areas of suspected chronic encephalomalacia at right posterior parietal lobe, left frontal lobe and left temporal lobe. Electronically Signed: Johnson Campbell MD  12/29/2023 11:08 PM EST  Workstation ID: [id number]     CT CEREBRAL PERFUSION WITH & WITHOUT CONTRAST     Result Date: 12/29/2023  Impression: 1. No core infarct. 2. Area of prolonged transit time in the left frontal lobe which could be artifactual due to volume averaging with the calvarium given positioning. This may also relate to an area of chronic encephalomalacia secondary to remote infarct seen on noncontrast CT. Consider brain MR follow-up if concern for superimposed ischemia at this site. Electronically Signed: Johnson Campbell MD  12/29/2023 10:45 PM EST  Workstation ID: [id number]     CT Head Without Contrast Stroke Protocol     Result Date: 12/29/2023  Impression: 1. No intracranial hemorrhage. 2. Areas of encephalomalacia involving right posterior parietal lobe, left frontal lobe and left temporal lobe likely sequela of remote infarcts. 3. Postsurgical changes of right sided craniectomy. Electronically Signed: Johnson Campbell MD  12/29/2023 10:27 PM EST  Workstation ID: [id number]         I reviewed the patient's new clinical results.     Assessment/Plan:      Active and Resolved Problems            Active Hospital Problems     Diagnosis   POA    **CVA (cerebral vascular accident) [I63.9]   Yes       Resolved Hospital Problems   No resolved problems to display.         Acute encephalopathy  Suspected stroke versus Jonn's paralysis versus seizures.  Neurology is primary management.      Seizure disorder - no further seizure today.   On Keppra and Depakote.  Neurology following. Vimpat was added.      Left frontal subdural hematoma  Likely a complication of tenecteplase.  Neurosurgery recommended conservative management and follow-up in their office in 4 weeks.     Traumatic brain  injury/history of stroke: Hold aspirin at this time with subdural.  Dyslipidemia: Chronic and stable. Continue statins.     Discoloration of left upper extremity  likely secondary to handcuff placement, length and arm position, patient in police custody and noted to have bilateral upper and lower metal cuffs in place        DVT - physical only with the patients recent brain bleed     Case was discussed in detail with the patients mother who is present at bedside. The patient will likely need rehab once everything is completed. PT, OT and speech therapy.     1/2/2024 - patient is doing ok. Seizure medications as being adjusted by neurology. Patient is going to need placement. Rehab would be ideal but I am not sure if that is an option with his current insurance. Case management to work on placement. No other changes today.      Code status is         Code Status and Medical Interventions:   Ordered at: 12/31/23 0757     Code Status (Patient has no pulse and is not breathing):     CPR (Attempt to Resuscitate)     Medical Interventions (Patient has pulse or is breathing):     Full Support

## 2024-01-02 NOTE — PAYOR COMM NOTE
"PA FORM WITH CLINICALS FOR INPATIENT PRECERT:                  AUTHORIZATION PENDING:   PLEASE CALL OR FAX DETERMINATION TO CONTACT BELOW. THANK YOU.        Clarisa Anna RN MSN  /UR  [facility]  [phone] office  [phone] fax  [email or web address]    [facility]  NPI: [id number]  Tax: [phone]          Neeraj Martin (38 y.o. Male)       Date of Birth   1985    Social Security Number       [place]   [address] [facility] [place] [address]    Home Phone       MRN   [id number]       Episcopalian   None    Marital Status                               Admission Date   12/29/23    Admission Type   Emergency    Admitting Provider   Milo Dalal DO    Attending Provider   Babak Miller DO    Department, Room/Bed   [facility] INTENSIVE CARE UNIT, 2314/1       Discharge Date       Discharge Disposition       Discharge Destination                                 Attending Provider: Babak Miller DO    Allergies: No Known Allergies    Isolation: None   Infection: None   Code Status: CPR    Ht: 175.3 cm (69\")   Wt: 85.7 kg (189 lb)    Admission Cmt: None   Principal Problem: CVA (cerebral vascular accident) [I63.9]                   Active Insurance as of 12/29/2023       Primary Coverage       Payor Plan Insurance Group Employer/Plan Group    CORRECTIONAL FACILITY [facility]       Coverage Address Coverage Phone Number Coverage Fax Number Effective Dates    [address] [phone]  12/29/2023 - None Entered    [place] [address]         Subscriber Name Subscriber Birth Date Member ID       NEERAJ MARTIN 1985 [id number]               Secondary Coverage       Payor Plan Insurance Group Employer/Plan Group    [facility]       Payor Plan Address Payor Plan Phone Number Payor Plan Fax Number Effective Dates    [address]   7/1/2023 - None Entered    [facility] [id number]         Subscriber Name " Subscriber Birth Date Member ID       NEERAJ MARTIN 1985 [id number]                     Emergency Contacts        (Rel.) Home Phone Work Phone Mobile Phone    CARLO MARTIN (Spouse) -- -- [phone]    Audrey Edouard (Mother) -- -- [phone]                 History & Physical        Charles JacindaMARYCARMEN Pat at 23 0022       Attestation signed by Tavon Morgan MD at 23 1702      Attending Addendum     Subjective: Feels okay, has longstanding left-sided weakness due to history of prior stroke from trauma.  Last seizure was many years ago.    Exam: Alert and awake, oriented to time place and person.  Motor and sensory are grossly intact except for 4/5 strength on left upper and lower extremity.    Assessment / Plan:   Acute encephalopathy: s/p tenecteplase.  Suspected stroke versus stroke mimic from seizures.  Increased Keppra to 1500 mg twice daily.  Start aspirin in AM.  Added statins.  Follow MRI of the brain.    Dr. Tavon Morgan MD MPH  Staff Intensivist  23   17:01 EST                          Critical Care History and Physical     Neeraj Martin : 1985 MRN:[id number] LOS:0 ROOM: [facility]     Reason for admission: CVA (cerebral vascular accident)     Assessment / Plan     Altered Mental Status- resolved  R/o Cerebral Vascular accident  H/o seizures  -S/p TNK   -CT negative   -Neurology consulted  -Frequent neurochecks/NIHSS per protocol  -Bleeding precautions  -Repeat CT 24 hours post TNK administration  -Echo pending  -BP control, keep SBP < 180  -NPO until seen by SLP  -Lipid panel WNL  -A1c 5.6  -PT/OT      Nutrition:   NPO Diet NPO Type: Strict NPO     DVT prophylaxis:  Mechanical DVT prophylaxis orders are present.     History of Present illness     Neeraj Martin is a 38 y.o. male with PMH of seizure, TBI, alcohol abuse, and drug abuse who presented to the hospital for altered mental status, and was admitted with a principal diagnosis of CVA  (cerebral vascular accident).  Patient was brought to emergency room via EMS after he was found unresponsive by his cell mate.  Patient was found down by a officers and it was reported that he had possible seizure-like activity.  He was postictal, and when he came to the emergency room he was waking up but unable to move his left side.  A code stroke was initiated, CT was negative and patient was given TNK.    ACP: Patient does not have advance directive on file at this facility.  Full code, full interventions.    Patient was seen and examined on 12/30/23 at 04:31 EST .    Subjective / Review of systems     Review of Systems   Constitutional:  Negative for chills and fever.   HENT:  Negative for congestion and sore throat.    Respiratory:  Negative for cough and shortness of breath.    Cardiovascular:  Negative for chest pain and palpitations.   Gastrointestinal:  Negative for abdominal pain and nausea.   Endocrine: Negative for heat intolerance and polyuria.   Genitourinary:  Negative for dysuria and urgency.   Musculoskeletal:  Negative for arthralgias and myalgias.   Skin:  Negative for rash and wound.   Neurological:  Positive for seizures, speech difficulty, weakness and numbness.   Psychiatric/Behavioral:  Negative for suicidal ideas. The patient is not nervous/anxious.         Past Medical/Surgical/Social/Family History & Allergies     Past Medical History:   Diagnosis Date    Epilepsy     Headache     Hepatitis C virus infection 4/26/2021    Memory dysfunction following head trauma     Methicillin resistant Staphylococcus aureus infection 9/30/2018    Seizures     Stroke       Past Surgical History:   Procedure Laterality Date    BRAIN SURGERY Right 2020    HERNIA REPAIR        Social History     Socioeconomic History    Marital status:    Tobacco Use    Smoking status: Every Day     Packs/day: 1.00     Years: 15.00     Additional pack years: 0.00     Total pack years: 15.00     Types: Cigarettes      Passive exposure: Current    Smokeless tobacco: Current     Types: Chew   Vaping Use    Vaping Use: Every day    Substances: Nicotine    Devices: Disposable   Substance and Sexual Activity    Alcohol use: Yes     Comment: 1-2 drinks p/ wk    Drug use: Not Currently     Comment: 2022    Sexual activity: Yes      Family History   Family history unknown: Yes      No Known Allergies   Social Determinants of Health     Tobacco Use: High Risk (12/29/2023)    Patient History     Smoking Tobacco Use: Every Day     Smokeless Tobacco Use: Current     Passive Exposure: Current   Alcohol Use: Not on file   Financial Resource Strain: Not on file   Food Insecurity: Not on file   Transportation Needs: Not on file   Physical Activity: Not on file   Stress: Not on file   Social Connections: Unknown (10/9/2023)    Family and Community Support     Help with Day-to-Day Activities: Not on file     Lonely or Isolated: Not on file   Interpersonal Safety: Not At Risk (12/29/2023)    Abuse Screen     Unsafe at Home or Work/School: no     Feels Threatened by Someone?: no     Does Anyone Keep You from Contacting Others or Doint Things Outside the Home?: no     Physical Sign of Abuse Present: no   Depression: At risk (11/15/2023)    PHQ-2     PHQ-2 Score: 22   Housing Stability: Unknown (10/9/2023)    Housing Stability     Current Living Arrangements: Not on file     Potentially Unsafe Housing Conditions: Not on file   Utilities: Not on file   Health Literacy: Unknown (10/9/2023)    Education     Help with school or training?: Not on file     Preferred Language: Not on file   Employment: Unknown (10/9/2023)    Employment     Do you want help finding or keeping work or a job?: Not on file   Disabilities: Unknown (10/9/2023)    Disabilities     Concentrating, Remembering, or Making Decisions Difficulty: Not on file     Doing Errands Independently Difficulty: Not on file        Home Medications     Prior to Admission medications    Medication Sig  Start Date End Date Taking? Authorizing Provider   Cariprazine HCl (Vraylar) 4.5 MG capsule capsule Take 1 capsule by mouth Daily. 11/16/23   Fiona Grace APRN   levETIRAcetam (KEPPRA) 1000 MG tablet Take 1 tablet by mouth twice daily 12/25/23   Nanette Sterling APRN   Narcan 4 MG/0.1ML nasal spray 2 (Two) Times a Day As Needed. 4/6/21   Provider, MD Fouzia        Objective / Physical Exam     Vital signs:  Temp: 98.6 °F (37 °C)  BP: 115/78  Heart Rate: 97  Resp: 21  SpO2: 100 %  Weight: 86 kg (189 lb 9.5 oz)    Admission Weight: Weight: 86.2 kg (190 lb)    Physical Exam  Constitutional:       Appearance: Normal appearance. He is normal weight.   HENT:      Head: Normocephalic.      Nose: Nose normal.      Mouth/Throat:      Mouth: Mucous membranes are moist.   Eyes:      General: Visual field deficit present.      Extraocular Movements: Extraocular movements intact.      Pupils: Pupils are equal, round, and reactive to light.   Cardiovascular:      Rate and Rhythm: Normal rate and regular rhythm.      Pulses: Normal pulses.      Heart sounds: Normal heart sounds.   Pulmonary:      Effort: Pulmonary effort is normal.      Breath sounds: Normal breath sounds.   Abdominal:      General: Abdomen is flat. Bowel sounds are normal.      Palpations: Abdomen is soft.   Musculoskeletal:         General: Normal range of motion.      Cervical back: Normal range of motion.   Skin:     General: Skin is warm.   Neurological:      Mental Status: He is easily aroused. He is lethargic.      Cranial Nerves: Dysarthria present.      Motor: Weakness present.      Comments: Patient has noted dysarthria  Patient able to move all 4 extremities  He has decreased sensation on his left leg which has improved after TNK  Some visual deficits on the left side  He is lethargic but easily arousable   Psychiatric:         Mood and Affect: Mood normal.         Behavior: Behavior normal.          Labs     Results from last 7 days    Lab Units 12/29/23  2210   WBC 10*3/mm3 6.40   HEMATOCRIT % 43.6   PLATELETS 10*3/mm3 202      Results from last 7 days   Lab Units 12/29/23  2236   SODIUM mmol/L 140   POTASSIUM mmol/L 4.4   CHLORIDE mmol/L 104   CO2 mmol/L 28.0   BUN mg/dL 13   CREATININE mg/dL 0.82        Imaging     Chest X ray: My independent assessment showed no infiltrates or effusions    EKG: My independent evaluation showed normal sinus rhythm, no ST -T changes    Current Medications     Scheduled Meds:  atorvastatin, 80 mg, Oral, Nightly  levETIRAcetam, 500 mg, Intravenous, Q12H  sodium chloride, 10 mL, Intravenous, Q12H         Continuous Infusions:        Patient continues to be critically ill, remains at risk of clinical deterioration or death and needed high complexity decision making. I have spent a total of 35 minutes providing critical care services to this patient including but not limited to: review of labs/ microbiology/imaging/medications, serial monitoring of vital signs,  review of other consultant's notes, review of events in the last 24 hrs, monitoring input/output, review of treatment plan with bedside nurse, RT and other treatment team, management of life support and nutrition needs. No family at bedside.    Time spent in performing separately billable procedures and updating family is not included in the critical care time.       MARYCARMEN Reyes   Critical Care  12/30/23   04:31 EST      Electronically signed by Tavon Morgan MD at 12/30/23 1702          Emergency Department Notes        Victoriano Cruz MD at 12/29/23 2245          Subjective   History of Present Illness  38-year-old male presents for multiple seizures, altered mental status.  Last known normal per officer was 8 PM.  Reportedly has history of seizures.  Med list includes Keppra, olanzapine, Divalproex.  History limited by patient status.  Review of Systems  Positive for seizure activity.  Past Medical History:   Diagnosis Date     "Epilepsy     Headache     Hepatitis C virus infection 4/26/2021    Memory dysfunction following head trauma     Methicillin resistant Staphylococcus aureus infection 9/30/2018    Seizures     Stroke        No Known Allergies    Past Surgical History:   Procedure Laterality Date    BRAIN SURGERY Right 2020    HERNIA REPAIR         Family History   Family history unknown: Yes       Social History     Socioeconomic History    Marital status:    Tobacco Use    Smoking status: Every Day     Packs/day: 1.00     Years: 15.00     Additional pack years: 0.00     Total pack years: 15.00     Types: Cigarettes     Passive exposure: Current    Smokeless tobacco: Current     Types: Chew   Vaping Use    Vaping Use: Every day    Substances: Nicotine    Devices: Disposable   Substance and Sexual Activity    Alcohol use: Yes     Comment: 1-2 drinks p/ wk    Drug use: Not Currently     Comment: 2022    Sexual activity: Yes           Objective   Physical Exam  Significant distress, tachycardic rate and regular rhythm, shallow diminished breath sounds bilaterally, will follow some basic commands with his right arm and leg, has bilateral nystagmus, flaccid paralysis on the left, not crossing midline with left eyes with extinction to that side.    Procedures          ED Course      /87   Pulse 83   Temp 98.3 °F (36.8 °C) (Rectal)   Resp 18   Ht 175.3 cm (69\")   Wt 86.2 kg (190 lb)   SpO2 97%   BMI 28.06 kg/m²   Labs Reviewed   COMPREHENSIVE METABOLIC PANEL - Abnormal; Notable for the following components:       Result Value    Glucose 116 (*)     Calcium 8.4 (*)     All other components within normal limits    Narrative:     GFR Normal >60  Chronic Kidney Disease <60  Kidney Failure <15     CBC WITH AUTO DIFFERENTIAL - Abnormal; Notable for the following components:    RDW 16.0 (*)     All other components within normal limits   LIPID PANEL - Abnormal; Notable for the following components:    LDL Cholesterol  121 (*)  "    All other components within normal limits    Narrative:     Cholesterol Reference Ranges  (U.S. Department of Health and Human Services ATP III Classifications)    Desirable          <200 mg/dL  Borderline High    200-239 mg/dL  High Risk          >240 mg/dL      Triglyceride Reference Ranges  (U.S. Department of Health and Human Services ATP III Classifications)    Normal           <150 mg/dL  Borderline High  150-199 mg/dL  High             200-499 mg/dL  Very High        >500 mg/dL    HDL Reference Ranges  (U.S. Department of Health and Human Services ATP III Classifications)    Low     <40 mg/dl (major risk factor for CHD)  High    >60 mg/dl ('negative' risk factor for CHD)        LDL Reference Ranges  (U.S. Department of Health and Human Services ATP III Classifications)    Optimal          <100 mg/dL  Near Optimal     100-129 mg/dL  Borderline High  130-159 mg/dL  High             160-189 mg/dL  Very High        >189 mg/dL   PROTIME-INR - Normal   APTT - Normal   HEMOGLOBIN A1C - Normal   RAINBOW DRAW    Narrative:     The following orders were created for panel order [place] Draw.  Procedure                               Abnormality         Status                     ---------                               -----------         ------                     Green Top (Gel)[[id number]]                                  Final result               Lavender Top[[id number]]                                     Final result               Gold Top - SST[[id number]]                                                              Light Blue Top[[id number]]                                   Final result                 Please view results for these tests on the individual orders.   RAINBOW DRAW    Narrative:     The following orders were created for panel order [place] Draw.  Procedure                               Abnormality         Status                     ---------                               -----------         ------                      Green Top (Gel)[[id number]]                                  Final result               Lavender Top[[id number]]                                     Final result               Gold Top - SST[[id number]]                                   Final result               Light Blue Top[[id number]]                                   Final result                 Please view results for these tests on the individual orders.   URINE DRUG SCREEN   URINALYSIS W/ MICROSCOPIC IF INDICATED (NO CULTURE)   SINGLE HSTROPONIN T   POCT GLUCOSE FINGERSTICK   POCT GLUCOSE FINGERSTICK   POCT GLUCOSE FINGERSTICK   POCT GLUCOSE FINGERSTICK   POCT GLUCOSE FINGERSTICK   POCT GLUCOSE FINGERSTICK   TYPE AND SCREEN   GREEN TOP   LAVENDER TOP   LIGHT BLUE TOP   CBC AND DIFFERENTIAL    Narrative:     The following orders were created for panel order CBC & Differential.  Procedure                               Abnormality         Status                     ---------                               -----------         ------                     CBC Auto Differential[[id number]]        Abnormal            Final result                 Please view results for these tests on the individual orders.   GREEN TOP   LAVENDER TOP   GOLD TOP - SST   LIGHT BLUE TOP     Medications   sodium chloride 0.9 % flush 10 mL (has no administration in time range)   sodium chloride 0.9 % flush 10 mL (has no administration in time range)     Followed by   tenecteplase for stroke (TNKASE) injection 22 mg (22 mg Intravenous Given 12/29/23 2248)     Followed by   sodium chloride 0.9 % flush 10 mL (10 mL Intravenous Given 12/29/23 2257)   sodium chloride 0.9 % flush 10 mL (has no administration in time range)   sodium chloride 0.9 % flush 10 mL (has no administration in time range)   sodium chloride 0.9 % infusion 40 mL (has no administration in time range)   atorvastatin (LIPITOR) tablet 80 mg (has no administration in time range)   levETIRAcetam (KEPPRA) 2,000 mg in  sodium chloride 0.9 % 250 mL IVPB (2,000 mg Intravenous Given 12/29/23 2235)   iopamidol (ISOVUE-370) 76 % injection 150 mL (150 mL Intravenous Given 12/29/23 2234)     XR Chest 1 View    Result Date: 12/29/2023  Impression: Hypoinflated lungs with left basilar airspace disease which may relate to atelectasis, aspiration or pneumonia difficult to exclude. Electronically Signed: Johnson Campbell MD  12/29/2023 11:23 PM EST  Workstation ID: [id number]    CT Angiogram Head w AI Analysis of LVO    Result Date: 12/29/2023  Impression: 1. No central intracranial large vessel occlusion. 2. Patent bilateral carotid arteries. 3. Patent bilateral vertebral arteries. 4. Question of short segment moderate stenosis of left MCA M2 branch. Intracranial venous contamination and suboptimal contrast opacification limits assessment. 5. Areas of suspected chronic encephalomalacia at right posterior parietal lobe, left frontal lobe and left temporal lobe. Electronically Signed: Johnson Campbell MD  12/29/2023 11:08 PM EST  Workstation ID: [id number]    CT Angiogram Neck    Result Date: 12/29/2023  Impression: 1. No central intracranial large vessel occlusion. 2. Patent bilateral carotid arteries. 3. Patent bilateral vertebral arteries. 4. Question of short segment moderate stenosis of left MCA M2 branch. Intracranial venous contamination and suboptimal contrast opacification limits assessment. 5. Areas of suspected chronic encephalomalacia at right posterior parietal lobe, left frontal lobe and left temporal lobe. Electronically Signed: Johnson Campbell MD  12/29/2023 11:08 PM EST  Workstation ID: [id number]    CT CEREBRAL PERFUSION WITH & WITHOUT CONTRAST    Result Date: 12/29/2023  Impression: 1. No core infarct. 2. Area of prolonged transit time in the left frontal lobe which could be artifactual due to volume averaging with the calvarium given positioning. This may also relate to an area of chronic encephalomalacia secondary to remote infarct seen on  noncontrast CT. Consider brain MR follow-up if concern for superimposed ischemia at this site. Electronically Signed: Johnson Campbell MD  12/29/2023 10:45 PM EST  Workstation ID: [id number]    CT Head Without Contrast Stroke Protocol    Result Date: 12/29/2023  Impression: 1. No intracranial hemorrhage. 2. Areas of encephalomalacia involving right posterior parietal lobe, left frontal lobe and left temporal lobe likely sequela of remote infarcts. 3. Postsurgical changes of right sided craniectomy. Electronically Signed: Johnson Campbell MD  12/29/2023 10:27 PM EST  Workstation ID: [id number]                       Total (NIH Stroke Scale): 24                  Medical Decision Making  Problems Addressed:  Acute left-sided weakness: complicated acute illness or injury  Aphasia: complicated acute illness or injury  Seizure: complicated acute illness or injury    Amount and/or Complexity of Data Reviewed  Labs: ordered.  Radiology: ordered.  ECG/medicine tests: ordered.    Risk  Prescription drug management.  Decision regarding hospitalization.      Critical Care Time     The high probability of sudden, clinically significant deterioration in the patient's condition required the highest level of my preparedness to intervene urgently.  The services I provided to this patient were to treat and/or prevent clinically significant deterioration that could result in: Neurologic collapse and death. Services included the following: chart data review, reviewing nurses notes and/or old charts, documentation time, consultant collaboration regarding findings and treatment options, vital sign assessments and ordering, interpreting and reviewing diagnostic studies/lab tests.  Aggregate critical care time was 41 minutes, which includes only time during which I was engaged in work directly related to the patient's care, as described above, whether at the bedside or elsewhere in the Emergency Department. It did not include time spent performing  other reported procedures or the services of residents, students, nurses or physician assistants.    EKG interpretation: 10:31 PM, rate 136, sinus tachycardia, normal intervals, normal axis, nonspecific likely rate related ST changes.    My interpretation of CT head is negative for acute intracranial hemorrhage.  See above radiology interpretation.      Patient initially not responsive.  Then after examination began following commands with right upper extremity and right lower extremity.  Still not speaking.  Having bilateral nystagmus.    Code stroke called.  Teleneurology advising TNK.  This was ordered.    Patient beginning to have movement on left side at 12:15 AM.  Speech improving.  Will admit to ICU.  Final diagnoses:   Seizure   Aphasia   Acute left-sided weakness       ED Disposition  ED Disposition       ED Disposition   Decision to Admit    Condition   --    Comment   Level of Care: Critical Care [6]   Admitting Physician: KALYN BURDEN [[id number]]   Attending Physician: KALYN BURDEN [[id number]]                 No follow-up provider specified.       Medication List      No changes were made to your prescriptions during this visit.            Victoriano Cruz MD  12/30/23 0035      Electronically signed by Victoriano Cruz MD at 12/30/23 0035       Caroline Cruz, RN at 12/29/23 2202          Pt given 10 mg versed IN and 2 mg narcan IN     Electronically signed by Caroline Cruz RN at 12/29/23 2202       Operative/Procedure Notes (all)    No notes of this type exist for this encounter.          Physician Progress Notes (all)        Babak Miller,  at 01/02/24 1240          The patient feels well currently. The patients mother is present at bedside today. No nausea or vomiting. No light headedness or dizziness. Patient still has ongoing confusion. No further episodes of seizures over the night. The patient is able to follow commands but does so slowly. The mother states that he is not back to  baseline. Tolerating his diet without issue. Discussed with nursing staff. No other changes overnight.     Physical Exam  Vitals reviewed.   Eyes:      Extraocular Movements: Extraocular movements intact.      Pupils: Pupils are equal, round, and reactive to light.   Cardiovascular:      Rate and Rhythm: Normal rate and regular rhythm.   Pulmonary:      Effort: Pulmonary effort is normal.      Breath sounds: Normal breath sounds.   Abdominal:      General: Abdomen is flat.      Palpations: Abdomen is soft.   Musculoskeletal:         General: Normal range of motion.      Left forearm: Swelling present.      Left wrist: Swelling present.      Left hand: Swelling present. Decreased capillary refill.   Skin:     Capillary Refill: Capillary refill takes less than 2 seconds.      Comments: LUE discoloration likely secondary to handcuffs in place due to patient being in police custody.    Neurological:      Mental Status: He is alert. He is disoriented and confused.      Comments: Patient alert to self, disoriented to time, place, and situation.        CT Head Without Contrast     Result Date: 12/31/2023  Impression: 1.Stable 4 mm subdural hematoma along the left side of the posterior falx. 2.Stable chronic infarcts in the right parietal lobe, left frontal lobe and left temporal lobe. 3.Stable changes of prior right-sided craniectomy and cranioplasty. Electronically Signed: Jah Jiang MD  12/31/2023 4:08 AM EST  Workstation ID: [id number]     CT Head Without Contrast     Result Date: 12/30/2023  Impression: 1.New small posterior left frontal parafalcine subdural hematoma without mass effect. 2.Stable chronic infarcts in the right parietal lobe, left temporal lobe and left inferior frontal lobe. 3.Evidence of prior right convexity craniectomy with cranioplasty. Electronically Signed: Jah Jiang MD  12/30/2023 11:22 PM EST  Workstation ID: [id number]     MRI Brain Without Contrast     Result Date: 12/30/2023  Impression:  Limited exam as described. There is no evidence of acute brain ischemia. There is multifocal encephalomalacia involving the right parietal, left frontal, and left temporal lobes Electronically Signed: Afshin Schwartz  12/30/2023 5:00 PM EST  Workstation ID: [id number]     XR Abdomen KUB     Result Date: 12/30/2023  Impression: MRI screening KUB with no evidence of metallic foreign body. Electronically Signed: Robles Dias MD  12/30/2023 12:56 PM EST  Workstation ID: [id number]     XR Chest 1 View     Result Date: 12/29/2023  Impression: Hypoinflated lungs with left basilar airspace disease which may relate to atelectasis, aspiration or pneumonia difficult to exclude. Electronically Signed: Johnson Campbell MD  12/29/2023 11:23 PM EST  Workstation ID: [id number]     CT Angiogram Head w AI Analysis of LVO     Result Date: 12/29/2023  Impression: 1. No central intracranial large vessel occlusion. 2. Patent bilateral carotid arteries. 3. Patent bilateral vertebral arteries. 4. Question of short segment moderate stenosis of left MCA M2 branch. Intracranial venous contamination and suboptimal contrast opacification limits assessment. 5. Areas of suspected chronic encephalomalacia at right posterior parietal lobe, left frontal lobe and left temporal lobe. Electronically Signed: Johnson Campbell MD  12/29/2023 11:08 PM EST  Workstation ID: [id number]     CT Angiogram Neck     Result Date: 12/29/2023  Impression: 1. No central intracranial large vessel occlusion. 2. Patent bilateral carotid arteries. 3. Patent bilateral vertebral arteries. 4. Question of short segment moderate stenosis of left MCA M2 branch. Intracranial venous contamination and suboptimal contrast opacification limits assessment. 5. Areas of suspected chronic encephalomalacia at right posterior parietal lobe, left frontal lobe and left temporal lobe. Electronically Signed: Johnson Campbell MD  12/29/2023 11:08 PM EST  Workstation ID: [id number]     CT CEREBRAL PERFUSION WITH &  WITHOUT CONTRAST     Result Date: 12/29/2023  Impression: 1. No core infarct. 2. Area of prolonged transit time in the left frontal lobe which could be artifactual due to volume averaging with the calvarium given positioning. This may also relate to an area of chronic encephalomalacia secondary to remote infarct seen on noncontrast CT. Consider brain MR follow-up if concern for superimposed ischemia at this site. Electronically Signed: Johnson Campbell MD  12/29/2023 10:45 PM EST  Workstation ID: [id number]     CT Head Without Contrast Stroke Protocol     Result Date: 12/29/2023  Impression: 1. No intracranial hemorrhage. 2. Areas of encephalomalacia involving right posterior parietal lobe, left frontal lobe and left temporal lobe likely sequela of remote infarcts. 3. Postsurgical changes of right sided craniectomy. Electronically Signed: Johnson Campbell MD  12/29/2023 10:27 PM EST  Workstation ID: [id number]         I reviewed the patient's new clinical results.     Assessment/Plan:      Active and Resolved Problems            Active Hospital Problems     Diagnosis   POA    **CVA (cerebral vascular accident) [I63.9]   Yes       Resolved Hospital Problems   No resolved problems to display.         Acute encephalopathy  Suspected stroke versus Jonn's paralysis versus seizures.  Neurology is primary management.      Seizure disorder - no further seizure today.   On Keppra and Depakote.  Neurology following. Vimpat was added.      Left frontal subdural hematoma  Likely a complication of tenecteplase.  Neurosurgery recommended conservative management and follow-up in their office in 4 weeks.     Traumatic brain injury/history of stroke: Hold aspirin at this time with subdural.  Dyslipidemia: Chronic and stable. Continue statins.     Discoloration of left upper extremity  likely secondary to handcuff placement, length and arm position, patient in police custody and noted to have bilateral upper and lower metal cuffs in place         DVT - physical only with the patients recent brain bleed     Case was discussed in detail with the patients mother who is present at bedside. The patient will likely need rehab once everything is completed. PT, OT and speech therapy.     1/2/2024 - patient is doing ok. Seizure medications as being adjusted by neurology. Patient is going to need placement. Rehab would be ideal but I am not sure if that is an option with his current insurance. Case management to work on placement. No other changes today.      Code status is         Code Status and Medical Interventions:   Ordered at: 12/31/23 0757     Code Status (Patient has no pulse and is not breathing):     CPR (Attempt to Resuscitate)     Medical Interventions (Patient has pulse or is breathing):     Full Support       Electronically signed by Babak Miller DO at 01/02/24 1248       Lourdes Anderson APRN at 01/02/24 1001               LOS: 3 days     Chief Complaint:  Seizures    Subjective   SUBJECTIVE:    History taken from: patient chart RN    Interval History: Neeraj Martin was admitted on 12/29/2023 Neeraj Martin is a 38 y.o. male with hx of known traumatic brain injury, and seizures on keppra 1000 mg BID, presenting for acute left sided weakness.  [facility] and last known normal 12/23/23 at 8 PM.  Pt had witnessed seizure en route for which he received Versed and Narcan.  Pt later developed left sided weakness.   CT head negative, old areas of encephalomalacia (right post parietal, left frontal, left temporal) and right hemicraniectomy.  MRI neg for acute stroke.  Repeat CT showed small left parafalcine SDH.   1/01: Per Dr. Canales, Had been having multiple partial seizures with versive head turning left, staring spells and left sided shaking that generalized on 12/31. Seizure meds were increased and no longer with head tonically left, or eyes deviated left.     Overnight on 12/31, he became very agitated, broke free from restraints  and tried toescapes.    - Portions of the above HPI were copied from previous encounters and edited as appropriate.    Pt's mother is at . She states he does not have any hx of alcohol abuse and he rarely drinks. He has used illicit drugs in the past, but was on probation and had to take drug screens so he was not using. He did, however, attempt suicide a few weeks ago and took several substances including meth at that time. Because of this, he failed his most recent drug screen and was arrested. He had been in skilled nursing for 2 weeks prior to his admission so he was not drinking. His mother is concerned that he may have not been getting his Keppra and Depakote at the skilled nursing, but she is not sure.     His mother states that he has been hospitalized 3 other times this year for seizures with similar course. He took nearly a month to recover last time and continued having hallucinations for several weeks. She is concerned the Keppra is contributing to his behavior changes over the past several weeks.     Patient Complaints: Pt is currently sleeping. He received Versed after he became agitated this morning.       Review of Systems   Unable to perform ROS: Other      Unable to obtain    Pertinent PMH:  has a past medical history of Epilepsy, Headache, Hepatitis C virus infection (4/26/2021), Memory dysfunction following head trauma, Methicillin resistant Staphylococcus aureus infection (9/30/2018), Seizures, and Stroke.   ________________________________________________  Objective   OBJECTIVE:  Pt is sleeping, snoring slightly. Mother at  requests to let pt sleep.   VSS  No respiratory distress  ___________________________________________   RESULTS REVIEW    VITAL SIGNS:  Temp:  [97.6 °F (36.4 °C)-98.4 °F (36.9 °C)] 98.4 °F (36.9 °C)  Heart Rate:  [] 100  Resp:  [12-16] 16  BP: ()/(42-91) 126/64    LABS:       Lab 01/01/24  0633 12/31/23  0830 12/31/23  0542 12/29/23  2210   WBC 10.10 8.90  --  6.40   HEMOGLOBIN  16.2 14.5  --  14.2   HEMATOCRIT 48.7 43.0  --  43.6   PLATELETS 187 180  --  202   NEUTROS ABS 6.50 5.90  --  3.40   LYMPHS ABS 2.40 1.90  --  2.30   MONOS ABS 1.00* 1.00*  --  0.50   EOS ABS 0.20 0.00  --  0.20   MCV 89.4 90.0  --  89.3   LACTATE  --  1.6 2.5*  --    PROTIME  --   --   --  11.5   APTT  --   --   --  25.1         Lab 01/01/24  0633 12/31/23  0830 12/29/23  2323 12/29/23 2236   SODIUM 140 142  --  140   POTASSIUM 3.6 4.0  --  4.4   CHLORIDE 101 104  --  104   CO2 29.0 29.0  --  28.0   ANION GAP 10.0 9.0  --  8.0   BUN 9 8  --  13   CREATININE 0.69* 0.66*  --  0.82   EGFR 120.7 122.4  --  114.6   GLUCOSE 84 101*  --  116*   CALCIUM 9.4 9.1  --  8.4*   HEMOGLOBIN A1C  --   --  5.60  --          Lab 01/01/24  0633 12/31/23  0830 12/29/23 2236   TOTAL PROTEIN 7.4 7.0 6.0   ALBUMIN 4.4 4.1 3.8   GLOBULIN 3.0 2.9 2.2   ALT (SGPT) 17 9 19   AST (SGOT) 18 13 16   BILIRUBIN 0.9 0.5 <0.2   ALK PHOS 51 45 40         Lab 12/30/23  0649 12/29/23  2210   HSTROP T 8  --    PROTIME  --  11.5   INR  --  1.06         Lab 12/29/23 2323   CHOLESTEROL 182   LDL CHOL 121*   HDL CHOL 47   TRIGLYCERIDES 77         Lab 12/29/23 2236   ABO TYPING O   RH TYPING Positive   ANTIBODY SCREEN Negative         UA          12/30/2023    04:58   Urinalysis   Specific [place], UA 1.054    Ketones, UA Negative    Blood, UA Negative    Leukocytes, UA Negative    Nitrite, UA Negative        Lab Results   Component Value Date    TSH 0.946 08/29/2023     (H) 12/29/2023    HGBA1C 5.60 12/29/2023         IMAGING STUDIES:  No radiology results for the last day    I reviewed the patient's new clinical results.    ________________________________________________      PROBLEM LIST:    CVA (cerebral vascular accident)        ASSESSMENT/PLAN:  1. Seizures, stroke mimic with Jonn's paralysis. He has had at least 3 previous hospital admissions this year due to seizures. Family denies alcohol use and states he was in [facility] for 2 weeks until  he came to the ED. It is noted that he attempted suicide several weeks ago and reportedly ingested multiple substances, including meth. However, family states, to their knowledge, he has not been using drugs or alcohol otherwise. His mother is concerned he was not receiving his seizure meds at the senior living.   2. TBI with multiple cortical areas of encephalomalacia  3. H/o localization related seizures 2ndy #2  4. Small left parafalcine SDH, s/p TNK--stable     PLAN:     1.   IV keppra 1500 mg BID, add Vitamin B6 100mg daily for agitation   2.   IV  q 6h, AST 18, ALT 17, NH3 24.   Intermittently check NH3 on VPA        *  Long term recommend considering switching VPA for Lamictal which is a very good AED and also good mood stabilizer  3.   IV Vimpat 200 mg BID  4.   Start IV thiamine/MVI  5.   Minimize sedating meds, opioids, analgesics, pain meds, anticholinergics.  6.   Delirium precautions.   7.   VTE ppx - SCD   8.   Mother has reported agitation at home which could be due to Keppra. Could consider an alternative, but will defer any changes at this time due to risk of status. Discussed recommendations with pt's mother and she will f/u with his neurologist. Will try Vit B6 for now.    **Please refer to previous notes for further details and recommendations.     I discussed the patient's findings and my recommendations with patient, nursing staff, and consulting provider    MARYCARMEN Martinez  01/02/24  10:01 EST        Electronically signed by Lourdes Anderson APRN at 01/02/24 1354       Babak Miller DO at 01/01/24 1249          The patient remains confused but he can follow commands. Patient has slurred speech which has been present for a while as per his mother. This normally resolves in 3-4 days. The patient denies any pain currently. No chest pain or SOB. The patient has no nausea or vomiting.  The patient appears in NAD currently.     Physical Exam  Vitals reviewed.   Eyes:      Extraocular  Movements: Extraocular movements intact.      Pupils: Pupils are equal, round, and reactive to light.   Cardiovascular:      Rate and Rhythm: Normal rate and regular rhythm.   Pulmonary:      Effort: Pulmonary effort is normal.      Breath sounds: Normal breath sounds.   Abdominal:      General: Abdomen is flat.      Palpations: Abdomen is soft.   Musculoskeletal:         General: Normal range of motion.      Left forearm: Swelling present.      Left wrist: Swelling present.      Left hand: Swelling present. Decreased capillary refill.   Skin:     Capillary Refill: Capillary refill takes less than 2 seconds.      Comments: LUE discoloration likely secondary to handcuffs in place due to patient being in police custody.    Neurological:      Mental Status: He is alert. He is disoriented and confused.      Comments: Patient alert to self, disoriented to time, place, and situation.        [facility] Medicine Services  Consult Note     Patient Name: Neeraj Martin  : 1985  MRN: [id number]  Primary Care Physician:  Nanette Sterling APRN  Referring Physician: MARYCARMEN House  Date of admission: 2023  Date and Time of Care: 2023 at 1250     Inpatient Hospitalist Consult  Consult performed by: Corinna Wick APRN  Consult ordered by: Tavon Morgan MD                 Reason for Consult/ Chief Complaint: Medical Management      Consult Requested By: Dr. Morgan Intensivist      Subjective:      History of Present Illness:   Per the documentation by Dr. Morgan, dated 2023, patient is a 38 y.o. male with PMH of traumatic brain injury, seizures, alcohol abuse, drug abuse presented to the hospital for altered mental status and was admitted with a principal diagnosis of CVA (cerebral vascular accident).  Presumed to be acute stroke versus seizure, loaded with Keppra.  CT head with areas of encephalomalacia in right parietal lobe, left frontotemporal  areas.  CTA with no large vessel occlusion.  Neurology was consulted, s/p tenecteplase.  MRI brain with no evidence of acute infarct but did show multifocal encephalomalacia in the right parietal, left frontotemporal lobes.  Subsequently, repeat CT head in 24 hours showed small left frontal parafalcine subdural hematoma without mass effect.  Neurosurgery was consulted and recommended follow-up in office in 4 weeks.      12/31/2023 patient to be transferred to [facility] and care to be taken over by hospitalist service.     Patient was seen and evaluated, patient is currently bilaterally hand cuffed to bed via bilateral upper extremities and lower extremities with  at bedside. No acute distress. Patient is alert and oriented to self only, disoriented to time, place and situation. Patient denies any complaints.      Review of Systems:   Review of Systems   Unable to perform ROS: Mental status change         Personal History:      Medical History        Past Medical History:   Diagnosis Date    Epilepsy      Headache      Hepatitis C virus infection 4/26/2021    Memory dysfunction following head trauma      Methicillin resistant Staphylococcus aureus infection 9/30/2018    Seizures      Stroke              Surgical History         Past Surgical History:   Procedure Laterality Date    BRAIN SURGERY Right 2020    HERNIA REPAIR                Family History: Family history is unknown by patient. Otherwise pertinent FHx was reviewed and not pertinent to current issue.     Social History:  reports that he has been smoking cigarettes. He has a 15.00 pack-year smoking history. He has been exposed to tobacco smoke. His smokeless tobacco use includes chew. He reports current alcohol use. He reports that he does not currently use drugs.     Home Medications:   Cariprazine HCl, OLANZapine, divalproex, levETIRAcetam, and naloxone     Allergies:  No Known Allergies        Objective:      Vital Signs  Temp:  [98.3 °F (36.8  °C)-98.7 °F (37.1 °C)] 98.3 °F (36.8 °C)  Heart Rate:  [61-92] 67  Resp:  [11-25] 17  BP: ()/() 141/93   Body mass index is 27.91 kg/m².     Physical Exam  Physical Exam  Vitals reviewed.   Eyes:      Extraocular Movements: Extraocular movements intact.      Pupils: Pupils are equal, round, and reactive to light.   Cardiovascular:      Rate and Rhythm: Normal rate and regular rhythm.   Pulmonary:      Effort: Pulmonary effort is normal.      Breath sounds: Normal breath sounds.   Abdominal:      General: Abdomen is flat.      Palpations: Abdomen is soft.   Musculoskeletal:         General: Normal range of motion.      Left forearm: Swelling present.      Left wrist: Swelling present.      Left hand: Swelling present. Decreased capillary refill.   Skin:     Capillary Refill: Capillary refill takes less than 2 seconds.      Comments: LUE discoloration likely secondary to handcuffs in place due to patient being in police custody.    Neurological:      Mental Status: He is alert. He is disoriented and confused.      Comments: Patient alert to self, disoriented to time, place, and situation.             Scheduled Meds   [Held by provider] aspirin, 81 mg, Oral, Daily  atorvastatin, 80 mg, Oral, Nightly  cholecalciferol, 2,000 Units, Oral, Daily  [Held by provider] enoxaparin, 40 mg, Subcutaneous, Daily  levETIRAcetam, 1,500 mg, Intravenous, Q12H  sodium chloride, 10 mL, Intravenous, Q12H  valproate, 750 mg, Oral, Q6H        PRN Meds     midazolam    sodium chloride    sodium chloride    sodium chloride   Infusions                   Diagnostic Data         Results from last 7 days   Lab Units 12/31/23  0830   WBC 10*3/mm3 8.90   HEMOGLOBIN g/dL 14.5   HEMATOCRIT % 43.0   PLATELETS 10*3/mm3 180   GLUCOSE mg/dL 101*   CREATININE mg/dL 0.66*   BUN mg/dL 8   SODIUM mmol/L 142   POTASSIUM mmol/L 4.0   AST (SGOT) U/L 13   ALT (SGPT) U/L 9   ALK PHOS U/L 45   BILIRUBIN mg/dL 0.5   ANION GAP mmol/L 9.0         CT Head  Without Contrast     Result Date: 12/31/2023  Impression: 1.Stable 4 mm subdural hematoma along the left side of the posterior falx. 2.Stable chronic infarcts in the right parietal lobe, left frontal lobe and left temporal lobe. 3.Stable changes of prior right-sided craniectomy and cranioplasty. Electronically Signed: Jah Jiang MD  12/31/2023 4:08 AM EST  Workstation ID: [id number]     CT Head Without Contrast     Result Date: 12/30/2023  Impression: 1.New small posterior left frontal parafalcine subdural hematoma without mass effect. 2.Stable chronic infarcts in the right parietal lobe, left temporal lobe and left inferior frontal lobe. 3.Evidence of prior right convexity craniectomy with cranioplasty. Electronically Signed: Jah Jiang MD  12/30/2023 11:22 PM EST  Workstation ID: [id number]     MRI Brain Without Contrast     Result Date: 12/30/2023  Impression: Limited exam as described. There is no evidence of acute brain ischemia. There is multifocal encephalomalacia involving the right parietal, left frontal, and left temporal lobes Electronically Signed: Afshin Schwartz  12/30/2023 5:00 PM EST  Workstation ID: [id number]     XR Abdomen KUB     Result Date: 12/30/2023  Impression: MRI screening KUB with no evidence of metallic foreign body. Electronically Signed: Robles Dias MD  12/30/2023 12:56 PM EST  Workstation ID: [id number]     XR Chest 1 View     Result Date: 12/29/2023  Impression: Hypoinflated lungs with left basilar airspace disease which may relate to atelectasis, aspiration or pneumonia difficult to exclude. Electronically Signed: Johnson Campbell MD  12/29/2023 11:23 PM EST  Workstation ID: [id number]     CT Angiogram Head w AI Analysis of LVO     Result Date: 12/29/2023  Impression: 1. No central intracranial large vessel occlusion. 2. Patent bilateral carotid arteries. 3. Patent bilateral vertebral arteries. 4. Question of short segment moderate stenosis of left MCA M2 branch. Intracranial venous  contamination and suboptimal contrast opacification limits assessment. 5. Areas of suspected chronic encephalomalacia at right posterior parietal lobe, left frontal lobe and left temporal lobe. Electronically Signed: Johnson Campbell MD  12/29/2023 11:08 PM EST  Workstation ID: [id number]     CT Angiogram Neck     Result Date: 12/29/2023  Impression: 1. No central intracranial large vessel occlusion. 2. Patent bilateral carotid arteries. 3. Patent bilateral vertebral arteries. 4. Question of short segment moderate stenosis of left MCA M2 branch. Intracranial venous contamination and suboptimal contrast opacification limits assessment. 5. Areas of suspected chronic encephalomalacia at right posterior parietal lobe, left frontal lobe and left temporal lobe. Electronically Signed: Johnson Campbell MD  12/29/2023 11:08 PM EST  Workstation ID: [id number]     CT CEREBRAL PERFUSION WITH & WITHOUT CONTRAST     Result Date: 12/29/2023  Impression: 1. No core infarct. 2. Area of prolonged transit time in the left frontal lobe which could be artifactual due to volume averaging with the calvarium given positioning. This may also relate to an area of chronic encephalomalacia secondary to remote infarct seen on noncontrast CT. Consider brain MR follow-up if concern for superimposed ischemia at this site. Electronically Signed: Johnson Campbell MD  12/29/2023 10:45 PM EST  Workstation ID: [id number]     CT Head Without Contrast Stroke Protocol     Result Date: 12/29/2023  Impression: 1. No intracranial hemorrhage. 2. Areas of encephalomalacia involving right posterior parietal lobe, left frontal lobe and left temporal lobe likely sequela of remote infarcts. 3. Postsurgical changes of right sided craniectomy. Electronically Signed: Johnson Campbell MD  12/29/2023 10:27 PM EST  Workstation ID: [id number]         I reviewed the patient's new clinical results.     Assessment/Plan:      Active and Resolved Problems        Active Hospital Problems      Diagnosis   POA    **CVA (cerebral vascular accident) [I63.9]   Yes       Resolved Hospital Problems   No resolved problems to display.         Acute encephalopathy  Suspected stroke versus Jonn's paralysis versus seizures.  Neurology is primary management.      Seizure disorder - no further seizure today.   On Keppra and Depakote.  Neurology following. Vimpat was added.      Left frontal subdural hematoma  Likely a complication of tenecteplase.  Neurosurgery recommended conservative management and follow-up in their office in 4 weeks.     Traumatic brain injury/history of stroke: Hold aspirin at this time with subdural.  Dyslipidemia: Chronic and stable. Continue statins.     Discoloration of left upper extremity  likely secondary to handcuff placement, length and arm position, patient in police custody and noted to have bilateral upper and lower metal cuffs in place  Police unable to change out cuff to provide for more length that would improve movement         DVT - physical only with the patients recent brain bleed    Case was discussed in detail with the patients mother who is present at bedside. The patient will likely need rehab once everything is completed. PT, OT and speech therapy.      Code status is       Code Status and Medical Interventions:   Ordered at: 12/31/23 0757     Code Status (Patient has no pulse and is not breathing):     CPR (Attempt to Resuscitate)     Medical Interventions (Patient has pulse or is breathing):     Full Support                Electronically signed by Babak Miller DO at 01/01/24 6732       Jacinda Canales MD at 01/01/24 6683               LOS: 2 days     Subjective     Has been having multiple partial seizures with versive head turning left, staring spells and left sided shaking that generalized yesterday.  Increased seizure meds, and and no longer with head tonically left, or eyes deviated left.     Overnight, he became very agitated, broke free from restraints and tried  toescapes.  Possible hx of ETOH abuse.       Objective     Vital Signs  Temp:  [98.4 °F (36.9 °C)-99 °F (37.2 °C)] 99 °F (37.2 °C)  Heart Rate:  [] 71  Resp:  [12-30] 16  BP: (123-163)/() 139/97  Intake & Output (last day)         12/31 0701  01/01 0700 01/01 0701  01/02 0700    P.O. 560 120    I.V. (mL/kg) 0 (0)     Total Intake(mL/kg) 560 (6.5) 120 (1.4)    Urine (mL/kg/hr) 1675 (0.8)     Stool 0     Total Output 1675     Net -1115 +120          Urine Unmeasured Occurrence 2 x     Stool Unmeasured Occurrence 3 x              Physical Exam:       Cognition: very lethargic, somnolent and mumbling.  Alert and oriented to person     Cranial nerves:  II - Pupils bilaterally briskly reactive to light  No new visual field deficits;  III,IV,VI: Intact  V: Normal facial sensations  VII: Intact   VIII: No new hearing changes  IX, X, XI: Normal gag and shoulder shrug;  XII: Tongue is in the midline.     Sensory:  Intact to light touch in all extremities. Neglects left side to DSS     Motor: Still tends to neglect left side.  Left side can exert good power, but appears weaker than right.     Cerebellar: Deferred     Gait and balance: Deferred     Results Review:     I reviewed the patient's new clinical results.    Lab Results (last 24 hours)       Procedure Component Value Units Date/Time    Comprehensive Metabolic Panel [[id number]]  (Abnormal) Collected: 01/01/24 0633    Specimen: Blood Updated: 01/01/24 0734     Glucose 84 mg/dL      BUN 9 mg/dL      Creatinine 0.69 mg/dL      Sodium 140 mmol/L      Potassium 3.6 mmol/L      Chloride 101 mmol/L      CO2 29.0 mmol/L      Calcium 9.4 mg/dL      Total Protein 7.4 g/dL      Albumin 4.4 g/dL      ALT (SGPT) 17 U/L      AST (SGOT) 18 U/L      Alkaline Phosphatase 51 U/L      Total Bilirubin 0.9 mg/dL      Globulin 3.0 gm/dL      A/G Ratio 1.5 g/dL      BUN/Creatinine Ratio 13.0     Anion Gap 10.0 mmol/L      eGFR 120.7 mL/min/1.73     Narrative:      GFR Normal  >60  Chronic Kidney Disease <60  Kidney Failure <15      CBC & Differential [[id number]]  (Abnormal) Collected: 01/01/24 0633    Specimen: Blood Updated: 01/01/24 0705    Narrative:      The following orders were created for panel order CBC & Differential.  Procedure                               Abnormality         Status                     ---------                               -----------         ------                     CBC Auto Differential[[id number]]        Abnormal            Final result                 Please view results for these tests on the individual orders.    CBC Auto Differential [[id number]]  (Abnormal) Collected: 01/01/24 0633    Specimen: Blood Updated: 01/01/24 0705     WBC 10.10 10*3/mm3      RBC 5.45 10*6/mm3      Hemoglobin 16.2 g/dL      Hematocrit 48.7 %      MCV 89.4 fL      MCH 29.7 pg      MCHC 33.2 g/dL      RDW 15.7 %      RDW-SD 48.6 fl      MPV 9.8 fL      Platelets 187 10*3/mm3      Neutrophil % 64.4 %      Lymphocyte % 23.6 %      Monocyte % 9.7 %      Eosinophil % 1.7 %      Basophil % 0.6 %      Neutrophils, Absolute 6.50 10*3/mm3      Lymphocytes, Absolute 2.40 10*3/mm3      Monocytes, Absolute 1.00 10*3/mm3      Eosinophils, Absolute 0.20 10*3/mm3      Basophils, Absolute 0.10 10*3/mm3      nRBC 0.1 /100 WBC     Ammonia [[id number]]  (Normal) Collected: 12/31/23 1916    Specimen: Blood Updated: 12/31/23 1941     Ammonia 24 umol/L     Prolactin [[id number]]  (Normal) Collected: 12/30/23 2246    Specimen: Blood Updated: 12/31/23 1737     Prolactin 9.69 ng/mL     Narrative:      Results may be falsely decreased if patient taking Biotin.            Imaging Results (Last 24 Hours)       ** No results found for the last 24 hours. 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      Neeraj Martin is a 38 y.o. male with hx of known traumatic brain injury, and seizures on keppra 1000 mg BID, presenting for acute left sided  weakness.  Pt was brought to ER from custodial and last known normal 23 at 8 PM.  Pt had witnessed seizure en route for which he received Versed and Narcan.  Pt later developed left sided weakness.   CT head -ve, save old areas of encephalomalacia (right post parietal, left frontal, left temporal) and right hemicraniectomy.  MRI neg for acute stroke.  Repeat CT showed small left parafalcine SDH.        IMPRESSION:   Stroke Mimic with Jonn's Paralysis    TBI with multiple cortical areas of encephalomalacia    H/o localization related seizures 2ndy #2  4.    Small left parafalcine SDH, s/p TNK     PLAN:    1.   IV keppra 1500 mg BID  2.   IV  q 6h, AST 18, ALT 17, NH3 24.   Intermittently check NH3 on VPA        *  Long term recommend considering switching VPA for Lamictal which is a very good AED and also good mood stabilizer  3.   IV Vimpat 200 mg BID  4.   Start IV thiamine/MVI  5.   Minimize sedating meds, opioids, analgesics, pain meds, anticholinergics.  6.   Delirium precautions.   7.   VTE ppx - SCD           Jacinda Canales MD  24  09:47 EST      Time: 35 CT 20    Electronically signed by Jacinda Canales MD at 24 1404       Tavon Morgan MD at 23 1137            Critical Care Progress Note   Neeraj Martin : 1985 MRN:[id number] LOS:1     Principal Problem: CVA (cerebral vascular accident)     Reason for follow up: All the medical problems listed below    Summary     A 38 y.o. male with PMH of traumatic brain injury, seizures, alcohol abuse, drug abuse presented to the hospital for altered mental status and was admitted with a principal diagnosis of CVA (cerebral vascular accident).  Presumed to be acute stroke versus seizure, loaded with Keppra.  CT head with areas of encephalomalacia in right parietal lobe, left frontotemporal areas.  CTA with no large vessel occlusion.  Neurology was consulted, s/p tenecteplase.  MRI brain with no evidence of acute infarct but did  show multifocal encephalomalacia in the right parietal, left frontotemporal lobes.  Subsequently, repeat CT head in 24 hours showed small left frontal parafalcine subdural hematoma without mass effect.  Neurosurgery was consulted and recommended follow-up in office in 4 weeks.    Significant events     12/31/23 : Transfer to [facility] under hospitalist service.    Assessment / Plan     Acute encephalopathy  Suspected stroke versus Jonn's paralysis versus seizures.  Neurology following, continue with neurochecks every 2 hours.    Seizure disorder  On Keppra and Depakote.  Neurology following.    Left frontal subdural hematoma  Likely a complication of tenecteplase.  Neurosurgery recommended conservative management and follow-up in their office in 4 weeks.    Traumatic brain injury/history of stroke: Hold aspirin at this time.  Dyslipidemia: Chronic and stable. Continue statins.    Code status:   Code Status (Patient has no pulse and is not breathing): CPR (Attempt to Resuscitate)  Medical Interventions (Patient has pulse or is breathing): Full Support       Nutrition: Diet: Regular/House Diet; Safe Tray; Texture: Regular Texture (IDDSI 7); Fluid Consistency: Thin (IDDSI 0)   Patient isn't on Tube Feeding    DVT prophylaxis:  Medical and mechanical DVT prophylaxis orders are present.     Subjective / Review of systems     Review of Systems   More confused today, able to tell his name but thinks he is at home.  Objective / Physical Exam   Vital signs:  Temp: 98.3 °F (36.8 °C)  BP: 155/94  Heart Rate: 61  Resp: 15  SpO2: 97 %  Weight: 85.7 kg (189 lb)    Admission Weight: Weight: 86.2 kg (190 lb)  Current Weight: Weight: 85.7 kg (189 lb)    Input/Output in last 24 hours:    Intake/Output Summary (Last 24 hours) at 12/31/2023 1143  Last data filed at 12/31/2023 0600  Gross per 24 hour   Intake 1180 ml   Output 2100 ml   Net -920 ml      Physical Exam  Vitals and nursing note reviewed.   Constitutional:       General: He is  awake. He is not in acute distress.     Appearance: Normal appearance.   HENT:      Mouth/Throat:      Mouth: Mucous membranes are moist.      Pharynx: Oropharynx is clear.   Eyes:      General: No scleral icterus.     Extraocular Movements: Extraocular movements intact.      Conjunctiva/sclera: Conjunctivae normal.   Cardiovascular:      Rate and Rhythm: Normal rate.      Heart sounds: No murmur heard.     No gallop.   Pulmonary:      Breath sounds: Normal breath sounds. No wheezing or rales.   Abdominal:      General: Bowel sounds are normal.      Palpations: Abdomen is soft.      Tenderness: There is no abdominal tenderness.   Musculoskeletal:         General: No tenderness.      Right lower leg: No edema.      Left lower leg: No edema.   Neurological:      Mental Status: He is alert.      Comments: Alert, awake, oriented only to his name.  Confused, able to lift all 4 extremities against gravity, on 4 point restraints.   Psychiatric:         Behavior: Behavior is cooperative.        Radiology and Labs     Results from last 7 days   Lab Units 12/31/23  0830 12/29/23  2210   WBC 10*3/mm3 8.90 6.40   HEMATOCRIT % 43.0 43.6   PLATELETS 10*3/mm3 180 202      Results from last 7 days   Lab Units 12/31/23  0830 12/29/23  2236   SODIUM mmol/L 142 140   POTASSIUM mmol/L 4.0 4.4   CHLORIDE mmol/L 104 104   CO2 mmol/L 29.0 28.0   BUN mg/dL 8 13   CREATININE mg/dL 0.66* 0.82      Current medications   Scheduled Meds: [Held by provider] aspirin, 81 mg, Oral, Daily  atorvastatin, 80 mg, Oral, Nightly  cholecalciferol, 2,000 Units, Oral, Daily  [Held by provider] enoxaparin, 40 mg, Subcutaneous, Daily  levETIRAcetam, 1,500 mg, Intravenous, Q12H  sodium chloride, 10 mL, Intravenous, Q12H  valproate, 750 mg, Oral, Q6H      Continuous Infusions:      Plan discussed with RN. Reviewed all other data in the last 24 hours, including but not limited to vitals, labs, microbiology, imaging and pertinent notes from other providers.      Tavon Morgan MD   Critical Care  12/31/23   11:43 EST       Electronically signed by Tavon Morgan MD at 12/31/23 1143       Jacinda Canales MD at 12/31/23 1132               LOS: 1 day       Chief Complaint:  Jonn's paralysis after witnessed convulsive seizure    Subjective     Has been having multiple partial seizures with versive head turning left, staring spells and left sided shaking that generalized.     Objective     Vital Signs  Temp:  [98.3 °F (36.8 °C)-98.7 °F (37.1 °C)] 98.3 °F (36.8 °C)  Heart Rate:  [61-97] 61  Resp:  [11-25] 15  BP: ()/(53-98) 155/94  Intake & Output (last day)         12/30 0701  12/31 0700 12/31 0701  01/01 0700    P.O. 1080     I.V. (mL/kg) 100 (1.2)     Total Intake(mL/kg) 1180 (13.8)     Urine (mL/kg/hr) 2100 (1)     Total Output 2100     Net -920                    Physical Exam:       Cognition:   Alert and oriented.  Fund of knowledge preserved.  Concentration and attention normal.   Language normal with normal comprehension, fluent speech     Cranial nerves:     II - Pupils bilaterally briskly reactive to light  No new visual field deficits;  III,IV,VI: Intact  V: Normal facial sensations  VII: Intact   VIII: No new hearing changes  IX, X, XI: Normal gag and shoulder shrug;  XII: Tongue is in the midline.     Sensory:  Intact to light touch in all extremities. Neglects left side to DSS     Motor: Still tends to turn head left and neglects left side though has good power when he tries on left  Reflexes: Plantars are flexor.     Cerebellar: FTN intact     Gait and balance: Deferred     Results Review:     I reviewed the patient's new clinical results.    Lab Results (last 24 hours)       Procedure Component Value Units Date/Time    STAT Lactic Acid, Reflex [[id number]]  (Normal) Collected: 12/31/23 0830    Specimen: Blood Updated: 12/31/23 0904     Lactate 1.6 mmol/L     Comprehensive Metabolic Panel [[id number]]  (Abnormal) Collected: 12/31/23 0830     Specimen: Blood Updated: 12/31/23 0859     Glucose 101 mg/dL      BUN 8 mg/dL      Creatinine 0.66 mg/dL      Sodium 142 mmol/L      Potassium 4.0 mmol/L      Chloride 104 mmol/L      CO2 29.0 mmol/L      Calcium 9.1 mg/dL      Total Protein 7.0 g/dL      Albumin 4.1 g/dL      ALT (SGPT) 9 U/L      AST (SGOT) 13 U/L      Alkaline Phosphatase 45 U/L      Total Bilirubin 0.5 mg/dL      Globulin 2.9 gm/dL      A/G Ratio 1.4 g/dL      BUN/Creatinine Ratio 12.1     Anion Gap 9.0 mmol/L      eGFR 122.4 mL/min/1.73     Narrative:      GFR Normal >60  Chronic Kidney Disease <60  Kidney Failure <15      CBC & Differential [[id number]]  (Abnormal) Collected: 12/31/23 0830    Specimen: Blood Updated: 12/31/23 0842    Narrative:      The following orders were created for panel order CBC & Differential.  Procedure                               Abnormality         Status                     ---------                               -----------         ------                     CBC Auto Differential[[id number]]        Abnormal            Final result                 Please view results for these tests on the individual orders.    CBC Auto Differential [[id number]]  (Abnormal) Collected: 12/31/23 0830    Specimen: Blood Updated: 12/31/23 0842     WBC 8.90 10*3/mm3      RBC 4.77 10*6/mm3      Hemoglobin 14.5 g/dL      Hematocrit 43.0 %      MCV 90.0 fL      MCH 30.4 pg      MCHC 33.8 g/dL      RDW 15.8 %      RDW-SD 48.6 fl      MPV 9.4 fL      Platelets 180 10*3/mm3      Neutrophil % 66.4 %      Lymphocyte % 21.3 %      Monocyte % 11.7 %      Eosinophil % 0.3 %      Basophil % 0.3 %      Neutrophils, Absolute 5.90 10*3/mm3      Lymphocytes, Absolute 1.90 10*3/mm3      Monocytes, Absolute 1.00 10*3/mm3      Eosinophils, Absolute 0.00 10*3/mm3      Basophils, Absolute 0.00 10*3/mm3      nRBC 0.0 /100 WBC     Lactic Acid, Plasma [[id number]]  (Abnormal) Collected: 12/31/23 0542    Specimen: Blood Updated: 12/31/23 0707     Lactate 2.5  mmol/L     Prolactin [[id number]] Collected: 12/30/23 2246    Specimen: Blood Updated: 12/30/23 2249           Imaging Results (Last 24 Hours)       Procedure Component Value Units Date/Time    CT Head Without Contrast [[id number]] Collected: 12/31/23 0406     Updated: 12/31/23 0410    Narrative:      CT HEAD WO CONTRAST    Date of Exam: 12/31/2023 4:00 AM EST    Indication: Seizure disorder, clinical change.    Comparison: 12/30/2023.    Technique: Axial CT images were obtained of the head without contrast administration.  Coronal reconstructions were performed.  Automated exposure control and iterative reconstruction methods were used.      Findings:  There is redemonstration of L3-4 mm small subdural hematoma along the left side of the posterior falx. There are stable chronic infarcts in the right parietal lobe, left frontal lobe and left temporal lobe. There is no new hypoattenuating lesion in the   brain. No new hemorrhage. Stable findings of prior right-sided craniectomy with cranioplasty device in place. No mass effect or midline shift. Orbits appear unremarkable. The paranasal sinuses and mastoids appear clear. The calvarium appears intact.   Superficial soft tissues are unremarkable.      Impression:      Impression:  1.Stable 4 mm subdural hematoma along the left side of the posterior falx.  2.Stable chronic infarcts in the right parietal lobe, left frontal lobe and left temporal lobe.  3.Stable changes of prior right-sided craniectomy and cranioplasty.        Electronically Signed: Jah Jiang MD    12/31/2023 4:08 AM EST    Workstation ID: [id number]    CT Head Without Contrast [[id number]] Collected: 12/30/23 2317     Updated: 12/30/23 2324    Narrative:      CT HEAD WO CONTRAST    Date of Exam: 12/30/2023 10:54 PM EST    Indication: Stroke, follow up  24 Hours Post Thrombolytic Administration.    Comparison: Brain MRI 12/30/2023.    Technique: Axial CT images were obtained of the head without contrast  administration.  Coronal reconstructions were performed.  Automated exposure control and iterative reconstruction methods were used.      Findings:  There is a new small posterior left frontal parafalcine subdural hematoma measuring up to 4 mm thick. No mass effect. There are stable chronic infarcts in the right parietal lobe, left temporal lobe and left inferior frontal lobe. No new hypoattenuating   lesions in the brain. No midline shift or herniation. The brainstem, basal ganglia and cerebellum appear unremarkable. The orbits appear within normal limits. There is evidence of prior right convexity craniectomy with cranioplasty. The remainder of the   calvarium appears intact. Mastoids and paranasal sinuses appear clear. Superficial soft tissues are unremarkable.      Impression:      Impression:  1.New small posterior left frontal parafalcine subdural hematoma without mass effect.  2.Stable chronic infarcts in the right parietal lobe, left temporal lobe and left inferior frontal lobe.  3.Evidence of prior right convexity craniectomy with cranioplasty.        Electronically Signed: Jah Jiang MD    12/30/2023 11:22 PM EST    Workstation ID: [id number]    MRI Brain Without Contrast [[id number]] Collected: 12/30/23 1656     Updated: 12/30/23 1702    Narrative:      MRI BRAIN WO CONTRAST    Date of Exam: 12/30/2023 4:17 PM EST    Indication: Stroke, follow up.     Comparison: None available.    Technique:  Routine multiplanar/multisequence sequence images of the brain were obtained without contrast administration.      Findings:  Exam limited by motion, and full complement of sequences was unable to be obtained. There is no restricted diffusion. There is no evidence of edema or mass effect. There are findings of encephalomalacia in the right parietal, inferior left frontal, and   left temporal lobes. There is no abnormal extra-axial fluid collection. Major intracranial flow voids are present there are postoperative  "changes of right craniotomy.      Impression:      Impression:  Limited exam as described. There is no evidence of acute brain ischemia. There is multifocal encephalomalacia involving the right parietal, left frontal, and left temporal lobes        Electronically Signed: Afshin Schwartz    12/30/2023 5:00 PM EST    Workstation ID: [id number]    XR Abdomen KUB [[id number]] Collected: 12/30/23 1254     Updated: 12/30/23 1259    Narrative:      XR ABDOMEN KUB    Date of Exam: 12/30/2023 12:51 PM EST    Indication: MRI compatiablilty    Comparison: None available.    Findings:  Abdomen appears somewhat \"gassy\", but no abnormally dilated bowel loops are seen. There are monitoring leads, but no evidence of metallic device or foreign body in the abdomen or pelvis that would preclude an MRI. The hemidiaphragms are incompletely   included on this exam but yesterday's chest radiograph shows no evidence of metallic foreign body here.      Impression:      Impression:  MRI screening KUB with no evidence of metallic foreign body.      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      Neeraj Martin is a 38 y.o. male with hx of known traumatic brain injury, and seizures on keppra 1000 mg BID, presenting for acute left sided weakness.  Pt was [facility] ER from shelter and last known normal 12/23/23 at 8 PM.  Pt had witnessed seizure en route for which he received Versed and Narcan.  Pt later developed left sided weakness.   CT head -ve, save old areas of encephalomalacia (right post parietal, left frontal, left temporal) and right hemicraniectomy.         IMPRESSION:   Stroke Mimic with Jonn's Paralysis    TBI with multiple cortical areas of encephalomalacia    H/o localization related seizures 2ndy #2     1.   MRI brain w/o - neg for acute CVA  2.   CT head showed small left " parafalcine SDH with no mass effect or midline shift    3.   Increase keppra 1500 mg BID  4.   Pt had been on home VPA.  Increased to IV  q 6 but continues to stare left, with versive head movements left and still neglecting right side. Start IV Vimpat 200 mg BID  5.   VTE ppx - SCD           Jacinda Canales MD  23  11:33 EST      Time: 35 CT 20    Electronically signed by Jacinda Canales MD at 23 1612          Consult Notes (all)        Corinna Wick APRN at 23 1203        Consult Orders    1. Inpatient Hospitalist Consult [[id number]] ordered by Tavon Morgan MD              Attestation signed by Wero Dill MD at 24 0744    I have reviewed this documentation and agree.                      [facility] Medicine Services  Consult Note    Patient Name: Neeraj Martin  : 1985  MRN: [id number]  Primary Care Physician:  Nanette Sterling APRN  Referring Physician: MARYCARMEN House  Date of admission: 2023  Date and Time of Care: 2023 at 1250    Inpatient Hospitalist Consult  Consult performed by: Corinna iWck APRN  Consult ordered by: Tavon Morgan MD            Reason for Consult/ Chief Complaint: Medical Management     Consult Requested By: Dr. Morgan Intensivist     Subjective:     History of Present Illness:   Per the documentation by Dr. Morgan, dated 2023, patient is a 38 y.o. male with PMH of traumatic brain injury, seizures, alcohol abuse, drug abuse presented to the hospital for altered mental status and was admitted with a principal diagnosis of CVA (cerebral vascular accident).  Presumed to be acute stroke versus seizure, loaded with Keppra.  CT head with areas of encephalomalacia in right parietal lobe, left frontotemporal areas.  CTA with no large vessel occlusion.  Neurology was consulted, s/p tenecteplase.  MRI brain with no evidence of acute infarct but did show multifocal encephalomalacia in the right parietal,  left frontotemporal lobes.  Subsequently, repeat CT head in 24 hours showed small left frontal parafalcine subdural hematoma without mass effect.  Neurosurgery was consulted and recommended follow-up in office in 4 weeks.     12/31/2023 patient to be transferred to [facility] and care to be taken over by hospitalist service.    Patient was seen and evaluated, patient is currently bilaterally hand cuffed to bed via bilateral upper extremities and lower extremities with  at bedside. No acute distress. Patient is alert and oriented to self only, disoriented to time, place and situation. Patient denies any complaints.     Review of Systems:   Review of Systems   Unable to perform ROS: Mental status change       Personal History:     Past Medical History:   Diagnosis Date    Epilepsy     Headache     Hepatitis C virus infection 4/26/2021    Memory dysfunction following head trauma     Methicillin resistant Staphylococcus aureus infection 9/30/2018    Seizures     Stroke        Past Surgical History:   Procedure Laterality Date    BRAIN SURGERY Right 2020    HERNIA REPAIR         Family History: Family history is unknown by patient. Otherwise pertinent FHx was reviewed and not pertinent to current issue.    Social History:  reports that he has been smoking cigarettes. He has a 15.00 pack-year smoking history. He has been exposed to tobacco smoke. His smokeless tobacco use includes chew. He reports current alcohol use. He reports that he does not currently use drugs.    Home Medications:   Cariprazine HCl, OLANZapine, divalproex, levETIRAcetam, and naloxone    Allergies:  No Known Allergies      Objective:     Vital Signs  Temp:  [98.3 °F (36.8 °C)-98.7 °F (37.1 °C)] 98.3 °F (36.8 °C)  Heart Rate:  [61-92] 67  Resp:  [11-25] 17  BP: ()/() 141/93   Body mass index is 27.91 kg/m².    Physical Exam  Physical Exam  Vitals reviewed.   Eyes:      Extraocular Movements: Extraocular movements intact.      Pupils:  Pupils are equal, round, and reactive to light.   Cardiovascular:      Rate and Rhythm: Normal rate and regular rhythm.   Pulmonary:      Effort: Pulmonary effort is normal.      Breath sounds: Normal breath sounds.   Abdominal:      General: Abdomen is flat.      Palpations: Abdomen is soft.   Musculoskeletal:         General: Normal range of motion.      Left forearm: Swelling present.      Left wrist: Swelling present.      Left hand: Swelling present. Decreased capillary refill.   Skin:     Capillary Refill: Capillary refill takes less than 2 seconds.      Comments: LUE discoloration likely secondary to handcuffs in place due to patient being in police custody.    Neurological:      Mental Status: He is alert. He is disoriented and confused.      Comments: Patient alert to self, disoriented to time, place, and situation.          Scheduled Meds   [Held by provider] aspirin, 81 mg, Oral, Daily  atorvastatin, 80 mg, Oral, Nightly  cholecalciferol, 2,000 Units, Oral, Daily  [Held by provider] enoxaparin, 40 mg, Subcutaneous, Daily  levETIRAcetam, 1,500 mg, Intravenous, Q12H  sodium chloride, 10 mL, Intravenous, Q12H  valproate, 750 mg, Oral, Q6H       PRN Meds     midazolam    sodium chloride    sodium chloride    sodium chloride   Infusions         Diagnostic Data    Results from last 7 days   Lab Units 12/31/23  0830   WBC 10*3/mm3 8.90   HEMOGLOBIN g/dL 14.5   HEMATOCRIT % 43.0   PLATELETS 10*3/mm3 180   GLUCOSE mg/dL 101*   CREATININE mg/dL 0.66*   BUN mg/dL 8   SODIUM mmol/L 142   POTASSIUM mmol/L 4.0   AST (SGOT) U/L 13   ALT (SGPT) U/L 9   ALK PHOS U/L 45   BILIRUBIN mg/dL 0.5   ANION GAP mmol/L 9.0       CT Head Without Contrast    Result Date: 12/31/2023  Impression: 1.Stable 4 mm subdural hematoma along the left side of the posterior falx. 2.Stable chronic infarcts in the right parietal lobe, left frontal lobe and left temporal lobe. 3.Stable changes of prior right-sided craniectomy and cranioplasty.  Electronically Signed: Jah Jiang MD  12/31/2023 4:08 AM EST  Workstation ID: [id number]    CT Head Without Contrast    Result Date: 12/30/2023  Impression: 1.New small posterior left frontal parafalcine subdural hematoma without mass effect. 2.Stable chronic infarcts in the right parietal lobe, left temporal lobe and left inferior frontal lobe. 3.Evidence of prior right convexity craniectomy with cranioplasty. Electronically Signed: Jah Jiang MD  12/30/2023 11:22 PM EST  Workstation ID: [id number]    MRI Brain Without Contrast    Result Date: 12/30/2023  Impression: Limited exam as described. There is no evidence of acute brain ischemia. There is multifocal encephalomalacia involving the right parietal, left frontal, and left temporal lobes Electronically Signed: Afshin Schwartz  12/30/2023 5:00 PM EST  Workstation ID: [id number]    XR Abdomen KUB    Result Date: 12/30/2023  Impression: MRI screening KUB with no evidence of metallic foreign body. Electronically Signed: Robles Dias MD  12/30/2023 12:56 PM EST  Workstation ID: [id number]    XR Chest 1 View    Result Date: 12/29/2023  Impression: Hypoinflated lungs with left basilar airspace disease which may relate to atelectasis, aspiration or pneumonia difficult to exclude. Electronically Signed: Johnson Campbell MD  12/29/2023 11:23 PM EST  Workstation ID: [id number]    CT Angiogram Head w AI Analysis of LVO    Result Date: 12/29/2023  Impression: 1. No central intracranial large vessel occlusion. 2. Patent bilateral carotid arteries. 3. Patent bilateral vertebral arteries. 4. Question of short segment moderate stenosis of left MCA M2 branch. Intracranial venous contamination and suboptimal contrast opacification limits assessment. 5. Areas of suspected chronic encephalomalacia at right posterior parietal lobe, left frontal lobe and left temporal lobe. Electronically Signed: Johnson Campbell MD  12/29/2023 11:08 PM EST  Workstation ID: [id number]    CT Angiogram Neck    Result  Date: 12/29/2023  Impression: 1. No central intracranial large vessel occlusion. 2. Patent bilateral carotid arteries. 3. Patent bilateral vertebral arteries. 4. Question of short segment moderate stenosis of left MCA M2 branch. Intracranial venous contamination and suboptimal contrast opacification limits assessment. 5. Areas of suspected chronic encephalomalacia at right posterior parietal lobe, left frontal lobe and left temporal lobe. Electronically Signed: Johnson Campbell MD  12/29/2023 11:08 PM EST  Workstation ID: [id number]    CT CEREBRAL PERFUSION WITH & WITHOUT CONTRAST    Result Date: 12/29/2023  Impression: 1. No core infarct. 2. Area of prolonged transit time in the left frontal lobe which could be artifactual due to volume averaging with the calvarium given positioning. This may also relate to an area of chronic encephalomalacia secondary to remote infarct seen on noncontrast CT. Consider brain MR follow-up if concern for superimposed ischemia at this site. Electronically Signed: Johnson Campbell MD  12/29/2023 10:45 PM EST  Workstation ID: [id number]    CT Head Without Contrast Stroke Protocol    Result Date: 12/29/2023  Impression: 1. No intracranial hemorrhage. 2. Areas of encephalomalacia involving right posterior parietal lobe, left frontal lobe and left temporal lobe likely sequela of remote infarcts. 3. Postsurgical changes of right sided craniectomy. Electronically Signed: Johnson Campbell MD  12/29/2023 10:27 PM EST  Workstation ID: [id number]       I reviewed the patient's new clinical results.    Assessment/Plan:     Active and Resolved Problems  Active Hospital Problems    Diagnosis  POA    **CVA (cerebral vascular accident) [I63.9]  Yes      Resolved Hospital Problems   No resolved problems to display.       Acute encephalopathy  Suspected stroke versus Jonn's paralysis versus seizures.  Neurology following, continue with neurochecks every 2 hours.     Seizure disorder  On Keppra and Depakote.  Neurology  following.     Left frontal subdural hematoma  Likely a complication of tenecteplase.  Neurosurgery recommended conservative management and follow-up in their office in 4 weeks.     Traumatic brain injury/history of stroke: Hold aspirin at this time.  Dyslipidemia: Chronic and stable. Continue statins.    Discoloration of left upper extremity  likely secondary to handcuff placement, length and arm position, patient in police custody and noted to have bilateral upper and lower metal cuffs in place  Police unable to change out cuff to provide for more length that would improve movement   Discussing alternative restraint options with attending Dr. Dill, Dr. Dill to evaluate patient.     DVT prophylaxis:  Medical and mechanical DVT prophylaxis orders are present.     Code status is   Code Status and Medical Interventions:   Ordered at: 12/31/23 0757     Code Status (Patient has no pulse and is not breathing):    CPR (Attempt to Resuscitate)     Medical Interventions (Patient has pulse or is breathing):    Full Support       Plan for disposition:pending clinical improvement and possible placement needs.     Time: 30 minutes        Signature: Electronically signed by MARYCARMEN Perea, 12/31/23, 13:33 EST.  [facility]      Electronically signed by Wero Dill MD at 01/01/24 0744       To Echeverria MD at 12/31/23 0910        Consult Orders    1. Inpatient Neurosurgery Consult [[id number]] ordered by Jacinda Canales MD at 12/31/23 0455                 NEUROSURGERY CONSULT      Neeraj Martin  1985  [id number]    Referring Provider: Nanette Sterling APRN  [address] [place] [address]  Reason for Consultation/Chief Complaint: Subdural hematoma    Patient Care Team:  Nanette Sterling APRN as PCP - General (Nurse Practitioner)    Subjective .     History of Present Illness:    Duration: 1 day  Severity: Moderate to severe  Timing: Acute  Associated Symptoms: Jonn's paralysis,  seizure  Narrative: Patient has a history of TBI and is currently incarcerated.  Found down after possible seizure-like activity.  Significantly postictal with gradual improvement in left-sided Jonn's paralysis.  Worked up for stroke which was negative.    While in the room and during my examination of the patient I wore a mask and eye protection.  I washed my hands before and after this patient encounter.  The patient was also wearing a mask.    Review of Systems: All 14 systems are reviewed and are negative except for what is documented above in the HPI  Review of Systems    History: I have reviewed and agree with the following documented PMH, PSH, FH and there no additions or changes.  Past Medical History:   Diagnosis Date    Epilepsy     Headache     Hepatitis C virus infection 4/26/2021    Memory dysfunction following head trauma     Methicillin resistant Staphylococcus aureus infection 9/30/2018    Seizures     Stroke     and   Past Surgical History:   Procedure Laterality Date    BRAIN SURGERY Right 2020    HERNIA REPAIR      and The patient has a family history of    Objective     Physical Exam:  CON:  Appears stated age.  Slightly postictal  HEENT:  Atraumatic and normocephalic.  PULM:  Breathing nonlabored on room air  CARDIO:  RRR, Pulses 2+  MSK:  Limbs intact. Musculoskeletal pain is absent  PSYCH:  Appears within normal limits.  SKIN:  No noticeable skin lesions or abrasions  VITALS:  Temp:  [98.3 °F (36.8 °C)-98.7 °F (37.1 °C)] 98.3 °F (36.8 °C)  Heart Rate:  [63-97] 66  Resp:  [11-27] 15  BP: ()/(53-98) 143/90, Body mass index is 27.91 kg/m².  NEURO: AOx3.  Facial asymmetry and dysarthria.  Generalized left-sided 3/5 weakness and numbness.  Slow to mobilize left side.  Follows commands in the right upper extremity and right lower extremity normally.      Results Review: I reviewed the patient's new clinical results.    CT: CT of the head was reviewed and shows stable small parafalcine subdural  hematoma without any evidence of mass effect.  Encephalomalacia in the left frontal and temporal lobes.  Evidence of a right-sided peek implant reconstruction.  MR: MRI of the brain w/wo contrast was reviewed and shows no evidence of acute ischemia with diffusion restriction    I personally reviewed the images from the following radiographic studies.    Lab Results (last 24 hours)       Procedure Component Value Units Date/Time    Prolactin [[id number]] Collected: 12/30/23 2246    Specimen: Blood Updated: 12/30/23 2249    Lactic Acid, Plasma [[id number]]  (Abnormal) Collected: 12/31/23 0542    Specimen: Blood Updated: 12/31/23 0707     Lactate 2.5 mmol/L     STAT Lactic Acid, Reflex [[id number]]  (Normal) Collected: 12/31/23 0830    Specimen: Blood Updated: 12/31/23 0904     Lactate 1.6 mmol/L     Comprehensive Metabolic Panel [[id number]]  (Abnormal) Collected: 12/31/23 0830    Specimen: Blood Updated: 12/31/23 0859     Glucose 101 mg/dL      BUN 8 mg/dL      Creatinine 0.66 mg/dL      Sodium 142 mmol/L      Potassium 4.0 mmol/L      Chloride 104 mmol/L      CO2 29.0 mmol/L      Calcium 9.1 mg/dL      Total Protein 7.0 g/dL      Albumin 4.1 g/dL      ALT (SGPT) 9 U/L      AST (SGOT) 13 U/L      Alkaline Phosphatase 45 U/L      Total Bilirubin 0.5 mg/dL      Globulin 2.9 gm/dL      A/G Ratio 1.4 g/dL      BUN/Creatinine Ratio 12.1     Anion Gap 9.0 mmol/L      eGFR 122.4 mL/min/1.73     Narrative:      GFR Normal >60  Chronic Kidney Disease <60  Kidney Failure <15      CBC & Differential [[id number]]  (Abnormal) Collected: 12/31/23 0830    Specimen: Blood Updated: 12/31/23 0842    Narrative:      The following orders were created for panel order CBC & Differential.  Procedure                               Abnormality         Status                     ---------                               -----------         ------                     CBC Auto Differential[[id number]]        Abnormal            Final result                  Please view results for these tests on the individual orders.    CBC Auto Differential [[id number]]  (Abnormal) Collected: 12/31/23 0830    Specimen: Blood Updated: 12/31/23 0842     WBC 8.90 10*3/mm3      RBC 4.77 10*6/mm3      Hemoglobin 14.5 g/dL      Hematocrit 43.0 %      MCV 90.0 fL      MCH 30.4 pg      MCHC 33.8 g/dL      RDW 15.8 %      RDW-SD 48.6 fl      MPV 9.4 fL      Platelets 180 10*3/mm3      Neutrophil % 66.4 %      Lymphocyte % 21.3 %      Monocyte % 11.7 %      Eosinophil % 0.3 %      Basophil % 0.3 %      Neutrophils, Absolute 5.90 10*3/mm3      Lymphocytes, Absolute 1.90 10*3/mm3      Monocytes, Absolute 1.00 10*3/mm3      Eosinophils, Absolute 0.00 10*3/mm3      Basophils, Absolute 0.00 10*3/mm3      nRBC 0.0 /100 WBC             Assessment & Plan :     Assessment: Neeraj Martin is a 38 y.o. male who presents with seizure, Jonn's paralysis, subdural hematoma  Additional workup: None  Surgery planned: None  Additional plan: The patient has improving Jonn's paralysis on the left side.  Still has some facial asymmetry and left-sided weakness and numbness but it is gradually resolving.  His head CT shows a small parafalcine subdural hematoma which is stable compared to his MRI from 12 hours prior.  No plan for any neurosurgical intervention at this time.  Please follow-up with Dr. Cobb in 4 weeks with another head CT.      CVA (cerebral vascular accident)      I discussed the patient's findings and my recommendations with patient and nursing staff    To Echeverria MD  12/31/23  09:10 EST      Electronically signed by To Echeverria MD at 12/31/23 0914       Jacinda Canales MD at 12/30/23 1111        Consult Orders    1. Inpatient Neurology Consult Stroke [[id number]] ordered by Husam Khan MD at 12/29/23 5744                 Primary Care Provider: Nanette Sterling APRN     Consult requested by:  Dr. Morgan    Reason for Consultation: Neurological evaluation for left sided  paralysis    History of present illness: Neeraj Martin is a 38 y.o. male with hx of known traumatic brain injury, and seizures on keppra 1000 mg BID, presenting for acute left sided weakness.  Pt was brought to ER from [facility] and last known normal 12/23/23 at 8 PM.  Pt had witnessed seizure en route for which he received Versed and Narcan.  There was no improvement, and pt developed left sided weakness.  CODE STOKE was activated in ER.  NIHSS 24 CT head showed left frontal, left temporal and right posterior parietal regions of encephalomalacia with post surgical changes of right craniectomy.  CTA h/n neg for LVO.    Given profound neuro deficits and short window for lytic tx, pt received IV TNK, and was subsequently admitted to ICU.       Review of Systems   As per HPI    PATIENT HISTORY:  Past Medical History:   Diagnosis Date    Epilepsy     Headache     Hepatitis C virus infection 4/26/2021    Memory dysfunction following head trauma     Methicillin resistant Staphylococcus aureus infection 9/30/2018    Seizures     Stroke    ,   Past Surgical History:   Procedure Laterality Date    BRAIN SURGERY Right 2020    HERNIA REPAIR     ,   Family History   Family history unknown: Yes   ,   Social History     Tobacco Use    Smoking status: Every Day     Packs/day: 1.00     Years: 15.00     Additional pack years: 0.00     Total pack years: 15.00     Types: Cigarettes     Passive exposure: Current    Smokeless tobacco: Current     Types: Chew   Vaping Use    Vaping Use: Every day    Substances: Nicotine    Devices: Disposable   Substance Use Topics    Alcohol use: Yes     Comment: 1-2 drinks p/ wk    Drug use: Not Currently     Comment: 2022   ,   Prior to Admission medications    Medication Sig Start Date End Date Taking? Authorizing Provider   Cariprazine HCl (Vraylar) 4.5 MG capsule capsule Take 1 capsule by mouth Daily. 11/16/23  Yes Fiona Grace APRN   levETIRAcetam (KEPPRA) 1000 MG tablet Take 1 tablet by  mouth twice daily 12/25/23  Yes Nanette Sterling, MARYCARMEN   Narcan 4 MG/0.1ML nasal spray 2 (Two) Times a Day As Needed. 4/6/21   Provider, MD Fouzia    Allergies:  Patient has no known allergies.    Current Facility-Administered Medications   Medication Dose Route Frequency Provider Last Rate Last Admin    [START ON 12/31/2023] aspirin EC tablet 81 mg  81 mg Oral Daily Tavon Morgan MD        atorvastatin (LIPITOR) tablet 80 mg  80 mg Oral Nightly Husam Khan MD        [START ON 12/31/2023] Enoxaparin Sodium (LOVENOX) syringe 40 mg  40 mg Subcutaneous Daily Tavon Morgan MD        levETIRAcetam (KEPPRA) injection 1,000 mg  1,000 mg Intravenous Q12H Tavon Morgan MD        sodium chloride 0.9 % flush 10 mL  10 mL Intravenous PRN Victoriano Cruz MD        sodium chloride 0.9 % flush 10 mL  10 mL Intravenous Q12H Husam Khan MD   10 mL at 12/30/23 0822    sodium chloride 0.9 % flush 10 mL  10 mL Intravenous PRN Husam Khan MD        sodium chloride 0.9 % infusion 40 mL  40 mL Intravenous PRN Husam Khan MD            ________________________________________________________     Objective   OBJECTIVE:    VITAL SIGNS:   Temp:  [98.3 °F (36.8 °C)-98.9 °F (37.2 °C)] 98.9 °F (37.2 °C)  Heart Rate:  [] 85  Resp:  [16-27] 27  BP: ()/(60-97) 120/74        General Exam:     Constitutional: The patient is in no apparent distress, bright awake and alert.    Head: Normocephalic, atraumatic.   Neck: No nuchal rigidity, ROM normal, supple  Resp: Breathing unlabored, breath sounds are normal  Cardiac: Regular rate and rhythm.   Extremities/MSK:  No clubbing, cyanosis or edema.  Psychiatric: Mood/affect normal, judgement normal, appropriate     Neuro exam:    Cognition:   Alert and oriented.  Fund of knowledge preserved.  Concentration and attention normal.   Language normal with normal comprehension, fluent speech     Cranial nerves:     II - Pupils bilaterally  briskly reactive to light  No new visual field deficits;  III,IV,VI: Intact  V: Normal facial sensations  VII: Intact   VIII: No new hearing changes  IX, X, XI: Normal gag and shoulder shrug;  XII: Tongue is in the midline.     Sensory:  Intact to light touch in all extremities. Neglects left side to DSS     Motor: Strength 5/5 bilaterally upper and lower extremities. No involuntary movements present. Normal tone and bulk.  Reflexes: Plantars are flexor.     Cerebellar: FTN intact     Gait and balance: Deferred       ________________________________________________________   RESULTS REVIEW:      LABS:      Lab 12/29/23 2210   WBC 6.40   HEMOGLOBIN 14.2   HEMATOCRIT 43.6   PLATELETS 202   NEUTROS ABS 3.40   LYMPHS ABS 2.30   MONOS ABS 0.50   EOS ABS 0.20   MCV 89.3   PROTIME 11.5   APTT 25.1         Lab 12/29/23 2323 12/29/23 2236   SODIUM  --  140   POTASSIUM  --  4.4   CHLORIDE  --  104   CO2  --  28.0   ANION GAP  --  8.0   BUN  --  13   CREATININE  --  0.82   EGFR  --  114.6   GLUCOSE  --  116*   CALCIUM  --  8.4*   HEMOGLOBIN A1C 5.60  --          Lab 12/29/23 2236   TOTAL PROTEIN 6.0   ALBUMIN 3.8   GLOBULIN 2.2   ALT (SGPT) 19   AST (SGOT) 16   BILIRUBIN <0.2   ALK PHOS 40         Lab 12/30/23  0649 12/29/23 2210   HSTROP T 8  --    PROTIME  --  11.5   INR  --  1.06         Lab 12/29/23 2323   CHOLESTEROL 182   LDL CHOL 121*   HDL CHOL 47   TRIGLYCERIDES 77         Lab 12/29/23 2236   ABO TYPING O   RH TYPING Positive   ANTIBODY SCREEN Negative         UA          12/30/2023    04:58   Urinalysis   Specific [place], UA 1.054    Ketones, UA Negative    Blood, UA Negative    Leukocytes, UA Negative    Nitrite, UA Negative        Lab Results   Component Value Date    TSH 0.946 08/29/2023     (H) 12/29/2023    HGBA1C 5.60 12/29/2023       IMAGING STUDIES:  XR Chest 1 View    Result Date: 12/29/2023  Impression: Hypoinflated lungs with left basilar airspace disease which may relate to atelectasis,  aspiration or pneumonia difficult to exclude. Electronically Signed: Johnson Campbell MD  12/29/2023 11:23 PM EST  Workstation ID: [id number]    CT Angiogram Head w AI Analysis of LVO    Result Date: 12/29/2023  Impression: 1. No central intracranial large vessel occlusion. 2. Patent bilateral carotid arteries. 3. Patent bilateral vertebral arteries. 4. Question of short segment moderate stenosis of left MCA M2 branch. Intracranial venous contamination and suboptimal contrast opacification limits assessment. 5. Areas of suspected chronic encephalomalacia at right posterior parietal lobe, left frontal lobe and left temporal lobe. Electronically Signed: Johnson Campbell MD  12/29/2023 11:08 PM EST  Workstation ID: [id number]    CT Angiogram Neck    Result Date: 12/29/2023  Impression: 1. No central intracranial large vessel occlusion. 2. Patent bilateral carotid arteries. 3. Patent bilateral vertebral arteries. 4. Question of short segment moderate stenosis of left MCA M2 branch. Intracranial venous contamination and suboptimal contrast opacification limits assessment. 5. Areas of suspected chronic encephalomalacia at right posterior parietal lobe, left frontal lobe and left temporal lobe. Electronically Signed: Johnson Campbell MD  12/29/2023 11:08 PM EST  Workstation ID: [id number]    CT CEREBRAL PERFUSION WITH & WITHOUT CONTRAST    Result Date: 12/29/2023  Impression: 1. No core infarct. 2. Area of prolonged transit time in the left frontal lobe which could be artifactual due to volume averaging with the calvarium given positioning. This may also relate to an area of chronic encephalomalacia secondary to remote infarct seen on noncontrast CT. Consider brain MR follow-up if concern for superimposed ischemia at this site. Electronically Signed: Johnson Campbell MD  12/29/2023 10:45 PM EST  Workstation ID: [id number]    CT Head Without Contrast Stroke Protocol    Result Date: 12/29/2023  Impression: 1. No intracranial hemorrhage. 2. Areas of  encephalomalacia involving right posterior parietal lobe, left frontal lobe and left temporal lobe likely sequela of remote infarcts. 3. Postsurgical changes of right sided craniectomy. Electronically Signed: Johnson Campbell MD  2023 10:27 PM EST  Workstation ID: [id number]     I reviewed the patient's new clinical results.    ________________________________________________________     PROBLEM LIST:    CVA (cerebral vascular accident)            ASSESSMENT/PLAN:    harshal Martin is a 38 y.o. male with hx of known traumatic brain injury, and seizures on keppra 1000 mg BID, presenting for acute left sided weakness.  Pt was brought to ER from [facility] and last known normal 23 at 8 PM.  Pt had witnessed seizure en route for which he received Versed and Narcan.  Pt later developed left sided weakness.   CT head -ve, save old areas of encephalomalacia (right post parietal, left frontal, left temporal) and right hemicraniectomy.       IMPRESSION:   Stroke vs. Stroke Mimic with Jonn's Paralysis    TBI with multiple cortical areas of encephalomalacia    H/o localization related seizures 2ndy #2    1.   NPO until pt passes swallow eval.   2.   MRI brain w/o  3.   Max RF modification: Blood pressure should be less than 180/105, HbA1c less than 6.5, LDL less than 70 - start high intensity STATIN  4.   Inpt telemetry  5.  TTE with bubble  6.   Fasting lipids, HbA1c, TSH  7.   PT/OT, and fall precautions    8.   Start bASA after 24 hrs  9.   Increase keppra 1500 mg BID  10.   VTE ppx - SCD's x 24 hrs        I discussed the patient's findings and my recommendations with patient and answered all his questions.    Jacinda Canales MD  23  11:13 EST           Electronically signed by Jacinda Canales MD at 23 2358       Husam Khan MD at 23 6300          [facility]   Teleneurology Note    Patient Name: Neeraj Martin  : 1985  MRN: [id number]  Primary Care Physician: Nanette Sterling  MARYCARMEN  Referring Site: [place]  Location of Neurologist: [place]    Subjective   Teleneurology Initial Data     Arrival Date Telestroke Site: 12/29/23     Neurologist Evaluation Date: 12/29/23 Neurologist Evaluation Time: 2246   Date Last Known Well: 12/29/23 Time Last Known Well: 2000     History     Chief Complaint: left sided paralysis  HPI: 38 years old white male with known diagnosis of traumatic brain injury, seizures on Keppra 1 g twice daily presented to the hospital via EMS from alf his last known normal 8 PM at that time he was seen by one of his friends who reported that he was at baseline without weakness, 10 minutes later became again and noticed him to have seizures and route he received Versed and Narcan and EMS noticed left-sided weakness, code stroke was called CT head showed old encephalomalacia's on the right and left MCA's, no acute findings, CT perfusion showed small penumbra on the left frontal, CT angio head and neck showed no large vessel occlusion.  Patient on exam has NIH score of 24 which include left side paralysis, gaze preference to the right side, left facial droop, left-sided neglect, dysarthria and altered mental status.  His presentation could be a Jonn's paralysis after seizures but giving that the patient presented within the window and stroke cannot be ruled out tnk was given at 1049 PM. I reviewed his prior neurological note from care everywhere 1 not on September 2023 mentioned that at baseline he has no focal weakness.    Stroke Risk Factors/ Pertinent Data           Scoring Scales     Intracerebral Hemmorhage (ICH) Score  Age>=80: no    NIH Stroke Scale     NIHSS Performed Date: 12/29/23     Interval: baseline  1a. Level of Consciousness: 1-->Not alert, but arousable by minor stimulation to obey, answer, or respond  1b. LOC Questions: 2-->Answers neither question correctly  1c. LOC Commands: 2-->Performs neither task correctly  2. Best Gaze: 1-->Partial gaze palsy, gaze is  abnormal in one or both eyes, but forced deviation or total gaze paresis is not present  3. Visual: 2-->Complete hemianopia  4. Facial Palsy: 2-->Partial paralysis (total or near-total paralysis of lower face)  5a. Motor Arm, Left: 4-->No movement  5b. Motor Arm, Right: 0-->No drift, limb holds 90 (or 45) degrees for full 10 secs  6a. Motor Leg, Left: 4-->No movement  6b. Motor Leg, Right: 0-->No drift, leg holds 30 degree position for full 5 secs  7. Limb Ataxia: 0-->Absent  8. Sensory: 2-->Severe to total sensory loss, patient is not aware of being touched in the face, arm, and leg  9. Best Language: 0-->No aphasia, normal  10. Dysarthria: 2-->Severe dysarthria, patients speech is so slurred as to be unintelligible in the absence of or out of proportion to any dysphasia, or is mute/anarthric  11. Extinction and Inattention (formerly Neglect): 2-->Profound nash-inattention/extinction more than 1 modality  Total (NIH Stroke Scale): 24     Review of Systems     Review of Systems  All reviewed and they are negative except what mentioned in HPI  Objective   Exam     Exam performed with the help of support staff from the referring site  Neurological Exam  Lethargic, alert to self but not to place or time does not follow commands, no aphasia  Right gaze preference, left lower facial droop, left hemianopsia  Left hemiplegia  Decreased sensation on the left compared to the right  Left-sided neglect  Result Review    Results          Personal review of CNS imaging:(Official report by radiologist pending)  Imaging  CT Imaging Review: CT Imaging reviewed, POSITIVE for abnormal finding  CTA Imaging Review: CTA Imaging reviewed, NO large vessel occlusion or severe stenosis seen  CT Perfusion Review: CT perfusion reviewed, ABNORMAL  CT Perfusion Details: Small left frontal penumbra    Thrombolytic   Thrombolytics: thrombolytic given  Date Thrombolytic Therapy Given PTA: 12/29/23  Time Thrombolytic Therapy Given PTA: 9867      Assessment & Plan   Assessment/ Plan     Assessment:  -Acute Stroke Evaluation: Suspected ACUTE ischemic stroke versus Jonn's paralysis  -Seizures      Plan:  1.  Patient received TNK for significant disabling left-sided weakness   2.  Repeat CT head 24 hours post TNK  3.  No aspirin or anticoagulation until 24-hour CT head negative for bleed  4.  Stroke labs  5.  2D echo with bubble study  6.  MRI brain without contrast  7.  Load the patient with 2 g of IV Keppra then start 1 g twice daily  8.  Use Ativan 2 mg as needed for seizures lasting more than 3 minutes  9.  Keep blood pressure less than 180/105, currently running at 144 systolic.  10.  Keep blood sugar less than 180, currently blood sugar 150    Patient is critically ill, discussed with nursing staff and ED physician.         Disposition          Medical Decision Making  Medical Data Reviewed: Data reviewed including: clinical labs, radiology and/or medical tests, Obtaining/ reviewing old medical records, Obtaining case history from another source, Independent review of CNS images  Length of visit: 60 minutes    IHusam MD, saw the patient on 12/29/23 at 2246 for an initial in-patient or emergency room telememedicine face to face consult using interactive technology for 60 minutes. The location of the patient was [facility]. I was located at [place].    I have proceeded with this evaluation at the request of the referring practitioner as it is felt to be an emergency setting and no appropriate specialist is available to perform this evaluation. The originating hospital has reported that this is the correct patient and has obtained consent from the patient/surrogate to perform this telemedicine evaluation(including obtaining history, performing examination and reviewing data provided by the patient an/or originating site of care provider)    I have introduced myself to the patient, provided my credentials, disclosed my location, and  determined that, based on review of the patient's chart and discussion with the patient's primary team, telemedicine via a HIPAA compliant, real-time, face-to-face two-way, interactive audio and video platform is an appropriate and effective means of providing the service.    The patient/surrogate has a right to refuse this evaluation as they have been explained risks including potential loss of confidentiality, benefits, alternatives, and the potential need for subsequent face-to-face care. In this evaluation, we will be providing recommendations only.  The ultimate decision to follow or not to follow these recommendations will be left to the bedside treating/requesting practitioner.    The patient/surrogate has been notified that other healthcare professionals including technical person may be involved in this A/V evaluation.  All laws concerning confidentiality and patient access to medical records and copies of medical records apply to telemedicine.  The patient/surrogate has received the originating site's Health Notice of Privacy Practices.    Husam Khan MD    Electronically signed by Husam Khan MD at 12/29/23 4342

## 2024-01-02 NOTE — PLAN OF CARE
Goal Outcome Evaluation:  Plan of Care Reviewed With: patient        Progress: improving   Pt is a 39 y/o M admitted to [facility] on 12/29/23 with complaints of acute L-sided weakness combined with witnessed seizure. Pt received TNK 12/29/23 at 22:49. CT Head (+) for encephalomalacia involving right posterior parietal lobe, left frontal lobe and left temporal lobe. MRI Brain (-) for acute brain ischemia. CT Head on 12/29/23 (+) for new small posterior left frontal parafalcine subdural hematoma with repeat CT Head on 12/31/23 showing stable 4 mm subdural hematoma. 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  He is currently incarcerated, but recently released from [facility] as of 12/31/23 due to medical concerns. Apparently he was living with spouse prior to nacho incarcerated ad was IND with ADLs and mobility with no AD. Unsure about current home environment post-dismissal from [facility]. This date, he is only oriented to self and has trouble following commands from PT. He exhibits erratic, spontaneous, impulsive behavior throughout session, although easily correctable. Bed mobility min A and transfers min A, while not safe for ambulation due to poor postural control and poor dynamic balance. HR ranging from 101-142bpm throughout session. He is well below baseline and is not safe to return home regardless of current home environment. Recommending SNF at d/c and PT will follow. If denied from SNF, will req 24/7 assist from family/spouse.    Anticipated Discharge Disposition (PT): skilled nursing facility

## 2024-01-02 NOTE — CASE MANAGEMENT/SOCIAL WORK
Discharge Planning Assessment  [facility]     Patient Name: Neeraj Martin  MRN: [id number]  Today's Date: 1/2/2024    Admit Date: 12/29/2023    Plan: Plan to return home, mother to assist if needed -pending clinical course and PT/OT eval.   Discharge Needs Assessment       Row Name 01/02/24 1221       Living Environment    People in Home spouse    Current Living Arrangements home    Potentially Unsafe Housing Conditions none    Primary Care Provided by self    Provides Primary Care For no one    Family Caregiver if Needed spouse    Family Caregiver Names Razia - spouse , Audrey - mother    Quality of Family Relationships helpful;involved;supportive    Able to Return to Prior Arrangements yes       Resource/Environmental Concerns    Resource/Environmental Concerns none    Transportation Concerns none       Transition Planning    Patient/Family Anticipates Transition to home with family    Patient/Family Anticipated Services at Transition none    Transportation Anticipated car, drives self;family or friend will provide       Discharge Needs Assessment    Readmission Within the Last 30 Days no previous admission in last 30 days    Equipment Currently Used at Home none    Concerns to be Addressed discharge planning    Anticipated Changes Related to Illness none    Equipment Needed After Discharge none                   Discharge Plan       Row Name 01/02/24 1223       Plan    Plan Plan to return home, mother to assist if needed -pending clinical course and PT/OT eval.    Patient/Family in Agreement with Plan yes    Plan Comments Patient lives at home with wife. Patient's motherAudrey will transport at discharge. Patient performs IADLs. PCP and pharmacy confirmed. Agreeable to M2B.  Denies financial assistance needs for medication and/or food. Denies HH and/or rehab needs. CM spoke with mother Audrey via telephone (wife Razia phone dc'd - in correction).  Mother  patient has a 17 yr old son that does not live with him.   Mother states patient has had these seizures in the past and it takes him several days to return to baseline.  DC needs will be pending clinical course and PT/OT evaluations.  DC Barriers:  Confusion, NIH 10, sitter, cardiac monitoring.                  Continued Care and Services - Admitted Since 12/29/2023    Coordination has not been started for this encounter.       Expected Discharge Date and Time       Expected Discharge Date Expected Discharge Time    Jan 5, 2024            Demographic Summary       Row Name 01/02/24 1220       General Information    Admission Type inpatient    Arrived From emergency department    Referral Source admission list    Reason for Consult discharge planning    Preferred Language English      Row Name 01/02/24 1200       General Information    Reason for Consult legal concerns    General Information Comments SW was consulted re: unsure if pt is released from correctional facility. ANGELIQUE called [facility] ([phone], option 2) and spoke with Lisa, who reports pt was released 12.31.2023 @4562 2/2 medical issues. She states pt is not required to report/return to the correctional facility at time of d/c from the hospital. As [employer] is listed as the payor, ANGELIQUE sent EM to MA and FC @ [phone] to inquire of whether pt has any other payor - awaiting response.                   Functional Status       Row Name 01/02/24 1221       Functional Status    Usual Activity Tolerance excellent    Current Activity Tolerance moderate       Functional Status, IADL    Medications independent    Meal Preparation independent    Housekeeping independent    Laundry independent    Shopping independent       Mental Status    General Appearance WDL WDL             GM Crandall RN  SIPS/ICU   O: [phone]  C: [phone]  [email or web address]

## 2024-01-02 NOTE — CASE MANAGEMENT/SOCIAL WORK
Social Work Assessment  [facility]     Patient Name: Neeraj Martin  MRN: [id number]  Today's Date: 1/2/2024    Admit Date: 12/29/2023     Demographic Summary       Row Name 01/02/24 1200       General Information    Reason for Consult legal concerns; insurance      General Information Comments SW was consulted re: unsure if pt is released from correctional facility. ANGELIQUE called [facility] ([phone], option 2) and spoke with Lisa, who reports pt was released 12.31.2023 @1262 2/2 medical issues. She states pt is not required to report/return to the correctional facility at time of d/c from the hospital. As [employer] is listed as the payor, ANGELIQUE sent EM to MA and FC @ 4093 to inquire of whether pt has any other payor - awaiting response.              Demographic Summary       Row Name 01/02/24 1626       General Information    Reason for Consult decision-making      General Information Comments Per , pt has active [employer]. ANGELIQUE and RNCM spoke with pt's mother after unsuccessful attempt at reaching pt's spouse and his mom reports pt' spouse is currently incaracerated. Pt has one child - a 17 y.o. son named Cesar. Due to legal NOK being incarcerated, pt's mother is next in line as alternate decision-maker according to HB 1119.             NADIYA Blanco, [facility]  Medical Social Worker  Ph [phone]  Fax [phone]  [email or web address]

## 2024-01-02 NOTE — THERAPY EVALUATION
Patient Name: Neeraj Martin  : 1985    MRN: [id number]                              Today's Date: 2024       Admit Date: 2023    Visit Dx:     ICD-10-CM ICD-9-CM   1. Seizure  R56.9 780.39   2. Aphasia  R47.01 784.3   3. Acute left-sided weakness  R53.1 728.87     Patient Active Problem List   Diagnosis    Hepatitis C virus infection    Methicillin resistant Staphylococcus aureus infection    CVA (cerebral vascular accident)     Past Medical History:   Diagnosis Date    Epilepsy     Headache     Hepatitis C virus infection 2021    Memory dysfunction following head trauma     Methicillin resistant Staphylococcus aureus infection 2018    Seizures     Stroke      Past Surgical History:   Procedure Laterality Date    BRAIN SURGERY Right 2020    HERNIA REPAIR        General Information       Row Name 24 1259          OT Time and Intention    Document Type evaluation  -MM     Mode of Treatment occupational therapy  -MM       Row Name 24 1259          General Information    Prior Level of Function independent:;ADL's;all household mobility  Pt was previously in jail, reports being independent with ADLs and mobility  -MM     Existing Precautions/Restrictions fall;seizures  -MM     Barriers to Rehab medically complex;previous functional deficit;ineffective coping  -MM       Row Name 24 1259          Living Environment    People in Home spouse;other (see comments)  apperantly lived with spouse prior to incarceration.  Spouse is currently in [facility].  -MM       Row Name 24 1259          Cognition    Orientation Status (Cognition) oriented to;person  -MM       Row Name 24 1259          Safety Issues, Functional Mobility    Impairments Affecting Function (Mobility) cognition;endurance/activity tolerance;coordination;balance;strength;motor planning  -MM               User Key  (r) = Recorded By, (t) = Taken By, (c) = Cosigned By      Initials Name Provider Type    MM  Jayme Moulton OT Occupational Therapist                     Mobility/ADL's       Row Name 01/02/24 1302          Bed Mobility    Bed Mobility bed mobility (all) activities  -MM     All Activities, [place] (Bed Mobility) minimum assist (75% patient effort)  -MM       Row Name 01/02/24 1302          Transfers    Transfers sit-stand transfer  -MM       Row Name 01/02/24 1302          Sit-Stand Transfer    Sit-Stand [place] (Transfers) minimum assist (75% patient effort);moderate assist (50% patient effort)  -MM       Row Name 01/02/24 1302          Activities of Daily Living    BADL Assessment/Intervention lower body dressing  -MM       Row Name 01/02/24 1302          Lower Body Dressing Assessment/Training    [place] Level (Lower Body Dressing) socks;moderate assist (50% patient effort);maximum assist (25% patient effort)  -MM               User Key  (r) = Recorded By, (t) = Taken By, (c) = Cosigned By      Initials Name Provider Type    MM Jayme Moulton OT Occupational Therapist                   Obj/Interventions       Row Name 01/02/24 1303          Range of Motion Comprehensive    General Range of Motion bilateral upper extremity ROM WFL  -MM       Row Name 01/02/24 1303          Strength Comprehensive (MMT)    General Manual Muscle Testing (MMT) Assessment upper extremity strength deficits identified  -MM     Comment, General Manual Muscle Testing (MMT) Assessment BUE strength grossly 4/5  -MM               User Key  (r) = Recorded By, (t) = Taken By, (c) = Cosigned By      Initials Name Provider Type    MM Jayme Moulton OT Occupational Therapist                   Goals/Plan       Row Name 01/02/24 1310          Bathing Goal 1 (OT)    Activity/Device (Bathing Goal 1, OT) bathing skills, all  -MM     [place] Level/Cues Needed (Bathing Goal 1, OT) contact guard required  -MM     Time Frame (Bathing Goal 1, OT) 2 weeks  -MM       Row Name 01/02/24 1310          Dressing Goal 1 (OT)     Activity/Device (Dressing Goal 1, OT) dressing skills, all  -MM     [place]/Cues Needed (Dressing Goal 1, OT) contact guard required  -MM     Time Frame (Dressing Goal 1, OT) 2 weeks  -MM       Row Name 01/02/24 1310          Toileting Goal 1 (OT)    Activity/Device (Toileting Goal 1, OT) toileting skills, all  -MM     [place] Level/Cues Needed (Toileting Goal 1, OT) contact guard required  -MM     Time Frame (Toileting Goal 1, OT) 2 weeks  -MM       Row Name 01/02/24 1310          Therapy Assessment/Plan (OT)    Planned Therapy Interventions (OT) activity tolerance training;functional balance retraining;BADL retraining;neuromuscular control/coordination retraining;patient/caregiver education/training;transfer/mobility retraining;strengthening exercise;ROM/therapeutic exercise  -MM               User Key  (r) = Recorded By, (t) = Taken By, (c) = Cosigned By      Initials Name Provider Type    MM Jayme Moulton, OT Occupational Therapist                   Clinical Impression       Row Name 01/02/24 1303          Pain Assessment    Additional Documentation Pain Scale: FACES Pre/Post-Treatment (Group)  -MM       Row Name 01/02/24 1303          Pain Scale: FACES Pre/Post-Treatment    Pain: FACES Scale, Pretreatment 0-->no hurt  -MM     Posttreatment Pain Rating 0-->no hurt  -MM       Row Name 01/02/24 1303          Plan of Care Review    Plan of Care Reviewed With patient  -MM     Outcome Evaluation Pt is a 37 yo male admitted 12/30/23 s/p multiple seizures and AMS. CTH (-) ICH.    Pt received TNK 12/29/23 at 22:49. 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  PMHx significant for Hep C, seizure d/o, memory dysfunction following head trauma, PEG (2018).  Pt is only oriented to self during evaluation.  Pt was apperantly in jail, but jail has since relased due to medical issues.  Prior to  patient was living with his wife and was independent with ADLs and mobility.  To note, patient's wife is in skilled nursing now as well, but patient has a supportive mother.  Pt unable to follow commands on this date.  Pt completed bed mobility with MIN-MOD A.  Pt completed sit to stand transfer with MIN-MOD A.  Pt very impulsive and unsafe.  Pt presents with deficits in balance, strength, transfers, ADLs, and increased falls risk indicating need for skilled OT services.  OT recommending SNF prior to returning home.  -MM       Row Name 01/02/24 1303          Therapy Assessment/Plan (OT)    Criteria for Skilled Therapeutic Interventions Met (OT) yes;meets criteria  -MM     Therapy Frequency (OT) 5 times/wk  -MM     Predicted Duration of Therapy Intervention (OT) until dc  -MM       Row Name 01/02/24 1303          Therapy Plan Review/Discharge Plan (OT)    Anticipated Discharge Disposition (OT) skilled nursing facility  -MM       Row Name 01/02/24 1303          Vital Signs    Pre Systolic BP Rehab 126  -MM     Pre Treatment Diastolic BP 64  -MM     Pretreatment Heart Rate (beats/min) 101  -MM     Intratreatment Heart Rate (beats/min) 142  -MM     Posttreatment Heart Rate (beats/min) 105  -MM       Row Name 01/02/24 1303          Positioning and Restraints    Pre-Treatment Position in bed  -MM     Post Treatment Position bed  -MM     In Bed notified nsg;call light within reach;encouraged to call for assist;exit alarm on;with nsg  -MM               User Key  (r) = Recorded By, (t) = Taken By, (c) = Cosigned By      Initials Name Provider Type    MM Jayme Moulton, OT Occupational Therapist                   Outcome Measures       Row Name 01/02/24 1310          How much help from another is currently needed...    Putting on and taking off regular lower body clothing? 2  -MM     Bathing (including washing, rinsing, and drying) 2  -MM     Toileting (which includes using toilet bed pan or urinal) 2  -MM     Putting on and taking off  regular upper body clothing 2  -MM     Taking care of personal grooming (such as brushing teeth) 2  -MM     Eating meals 3  -MM     AM-PAC 6 Clicks Score (OT) 13  -MM       Row Name 01/02/24 0800 01/02/24 0500       How much help from another person do you currently need...    Turning from your back to your side while in flat bed without using bedrails? 2  -DP 2  -JL    Moving from lying on back to sitting on the side of a flat bed without bedrails? 2  -DP 2  -JL    Moving to and from a bed to a chair (including a wheelchair)? 2  -DP 2  -JL    Standing up from a chair using your arms (e.g., wheelchair, bedside chair)? 2  -DP 2  -JL    Climbing 3-5 steps with a railing? 2  -DP 2  -JL    To walk in hospital room? 2  -DP 2  -JL    AM-PAC 6 Clicks Score (PT) 12  -DP 12  -JL    Highest Level of Mobility Goal 4 --> Transfer to chair/commode  -DP 4 --> Transfer to chair/commode  -JL      Row Name 01/02/24 1310          Modified [place] Scale    Modified [place] Scale 5 - Severe disability.  Bedridden, incontinent, and requiring constant nursing care and attention.  -MM       Row Name 01/02/24 1310          Functional Assessment    Outcome Measure Options AM-PAC 6 Clicks Daily Activity (OT);Modified Dariel  -MM               User Key  (r) = Recorded By, (t) = Taken By, (c) = Cosigned By      Initials Name Provider Type    Yessenia Silverio, RN Registered Nurse    Jayme Haines OT Occupational Therapist    Amaya Casillas, RN Registered Nurse                    Occupational Therapy Education       Title: PT OT SLP Therapies (In Progress)       Topic: Occupational Therapy (In Progress)       Point: ADL training (Done)       Description:   Instruct learner(s) on proper safety adaptation and remediation techniques during self care or transfers.   Instruct in proper use of assistive devices.                  Learning Progress Summary             Patient Acceptance, E, VU by RAMSES at 1/2/2024 1311                          Point: Home exercise program (Not Started)       Description:   Instruct learner(s) on appropriate technique for monitoring, assisting and/or progressing therapeutic exercises/activities.                  Learner Progress:  Not documented in this visit.              Point: Precautions (Not Started)       Description:   Instruct learner(s) on prescribed precautions during self-care and functional transfers.                  Learner Progress:  Not documented in this visit.              Point: Body mechanics (Not Started)       Description:   Instruct learner(s) on proper positioning and spine alignment during self-care, functional mobility activities and/or exercises.                  Learner Progress:  Not documented in this visit.                              User Key       Initials Effective Dates Name Provider Type Discipline     12/21/23 -  Jayme Moulton OT Occupational Therapist OT                  OT Recommendation and Plan  Planned Therapy Interventions (OT): activity tolerance training, functional balance retraining, BADL retraining, neuromuscular control/coordination retraining, patient/caregiver education/training, transfer/mobility retraining, strengthening exercise, ROM/therapeutic exercise  Therapy Frequency (OT): 5 times/wk  Plan of Care Review  Plan of Care Reviewed With: patient  Outcome Evaluation: Pt is a 39 yo male admitted 12/30/23 s/p multiple seizures and AMS. CTH (-) ICH.    Pt received TNK 12/29/23 at 22:49. 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  PMHx significant for Hep C, seizure d/o, memory dysfunction following head trauma, PEG (2018).  Pt is only oriented to self during evaluation.  [facility], but [facility] has since [facility] due to medical issues.  Prior to patient was living with his wife and was independent with ADLs and mobility.  To note,  patient's wife is in penitentiary now as well, but patient has a supportive mother.  Pt unable to follow commands on this date.  Pt completed bed mobility with MIN-MOD A.  Pt completed sit to stand transfer with MIN-MOD A.  Pt very impulsive and unsafe.  Pt presents with deficits in balance, strength, transfers, ADLs, and increased falls risk indicating need for skilled OT services.  OT recommending SNF prior to returning home.     Time Calculation:         Time Calculation- OT       Row Name 01/02/24 1312             Time Calculation- OT    OT Start Time 0924  -MM      OT Stop Time 0944  -MM      OT Time Calculation (min) 20 min  -MM      Total Timed Code Minutes- OT 0 minute(s)  -MM      OT Received On 01/02/24  -MM      OT - Next Appointment 01/03/24  -MM      OT Goal Re-Cert Due Date 01/16/24  -MM                User Key  (r) = Recorded By, (t) = Taken By, (c) = Cosigned By      Initials Name Provider Type    MM Jayme Moulton OT Occupational Therapist                  Therapy Charges for Today       Code Description Service Date Service Provider Modifiers Qty    [id number]  OT EVAL HIGH COMPLEXITY 4 1/2/2024 Jayme Moulton OT GO 1                 Jayme Moulton OT  1/2/2024

## 2024-01-02 NOTE — PLAN OF CARE
Goal Outcome Evaluation:  Plan of Care Reviewed With: patient        Progress: improving   Pt has Q12 NIHSS assessment, Q2 Neuro checks and CIWA per assessment checks. Pt is BRIDGETTE downgrade. Pt is on room air with no drips. Pt is on seizure precautions. Pt has had slightly soft pressures overnight but recovers quick each time. Pt is NSR. Oriented only to self. Pt has had a sitter. Pt scored a 10 for 1900 CIWA, but a 0 and 1 for the other 2. Pt was sleepy very well for last 2 CIWA assessments.

## 2024-01-02 NOTE — THERAPY EVALUATION
Patient Name: Neeraj Martin  : 1985    MRN: [id number]                              Today's Date: 2024       Admit Date: 2023    Visit Dx:     ICD-10-CM ICD-9-CM   1. Seizure  R56.9 780.39   2. Aphasia  R47.01 784.3   3. Acute left-sided weakness  R53.1 728.87     Patient Active Problem List   Diagnosis    Hepatitis C virus infection    Methicillin resistant Staphylococcus aureus infection    CVA (cerebral vascular accident)     Past Medical History:   Diagnosis Date    Epilepsy     Headache     Hepatitis C virus infection 2021    Memory dysfunction following head trauma     Methicillin resistant Staphylococcus aureus infection 2018    Seizures     Stroke      Past Surgical History:   Procedure Laterality Date    BRAIN SURGERY Right 2020    HERNIA REPAIR        General Information       Row Name 24 1327          Physical Therapy Time and Intention    Document Type evaluation  -MB     Mode of Treatment physical therapy  -MB       Row Name 24 1327          General Information    Prior Level of Function independent:;gait;community mobility;transfer;ADL's  -MB     Existing Precautions/Restrictions fall;seizures  -MB     Barriers to Rehab medically complex;previous functional deficit  -MB       Row Name 24 1327          Living Environment    People in Home other (see comments)  Recently incarcerated, unsure prior living arrangements  -MB       Row Name 24 1327          Home Main Entrance    Number of Stairs, Main Entrance none  -MB       Row Name 24 1327          Stairs Within Home, Primary    Number of Stairs, Within Home, Primary none  -MB       Row Name 24 1327          Cognition    Orientation Status (Cognition) oriented to;person  -MB       Row Name 24 1327          Safety Issues, Functional Mobility    Safety Issues Affecting Function (Mobility) ability to follow commands;at risk behavior observed;awareness of need for assistance;insight into  deficits/self-awareness;judgment;safety precaution awareness;safety precautions follow-through/compliance  -MB     Impairments Affecting Function (Mobility) balance;cognition;endurance/activity tolerance;motor control;postural/trunk control;strength  -MB     Cognitive Impairments, Mobility Safety/Performance attention;awareness, need for assistance;insight into deficits/self-awareness;problem-solving/reasoning;safety precaution awareness;safety precaution follow-through  -MB               User Key  (r) = Recorded By, (t) = Taken By, (c) = Cosigned By      Initials Name Provider Type    Kunal Jamil PT Physical Therapist                   Mobility       Row Name 01/02/24 1329          Bed Mobility    Bed Mobility bed mobility (all) activities  -MB     All Activities, [place] (Bed Mobility) minimum assist (75% patient effort)  -MB     Assistive Device (Bed Mobility) bed rails;head of bed elevated  -MB       Row Name 01/02/24 1329          Sit-Stand Transfer    Sit-Stand Gaines (Transfers) minimum assist (75% patient effort);moderate assist (50% patient effort)  -MB     Comment, (Sit-Stand Transfer) no AD  -MB       Row Name 01/02/24 1329          Gait/Stairs (Locomotion)    Patient was able to Ambulate no, other medical factors prevent ambulation  -MB     Reason Patient was unable to Ambulate Excessive Weakness;Dizziness  -MB     Comment, (Gait/Stairs) gait not assess this date due to very poor postural control and impulsive behavior  -MB               User Key  (r) = Recorded By, (t) = Taken By, (c) = Cosigned By      Initials Name Provider Type    Kunal Jamil PT Physical Therapist                   Obj/Interventions       Row Name 01/02/24 1330          Range of Motion Comprehensive    General Range of Motion no range of motion deficits identified  -MB       Row Name 01/02/24 1330          Strength Comprehensive (MMT)    Comment, General Manual Muscle Testing (MMT) Assessment BLE Strength 4/5  grossly  -MB       Row Name 01/02/24 1330          Balance    Balance Assessment sitting static balance;sitting dynamic balance;sit to stand dynamic balance;standing static balance;standing dynamic balance  -MB     Static Sitting Balance standby assist  -MB     Dynamic Sitting Balance contact guard;minimal assist  -MB     Position, Sitting Balance unsupported;sitting edge of bed  -MB     Sit to Stand Dynamic Balance contact guard  -MB     Static Standing Balance contact guard;minimal assist  -MB     Dynamic Standing Balance minimal assist;moderate assist  -MB       Row Name 01/02/24 1330          Sensory Assessment (Somatosensory)    Sensory Assessment (Somatosensory) sensation intact  -MB               User Key  (r) = Recorded By, (t) = Taken By, (c) = Cosigned By      Initials Name Provider Type    MB Kunal Meyer, PT Physical Therapist                   Goals/Plan       Row Name 01/02/24 1331          Bed Mobility Goal 1 (PT)    Activity/Assistive Device (Bed Mobility Goal 1, PT) bed mobility activities, all  -MB     [place] Level/Cues Needed (Bed Mobility Goal 1, PT) independent  -MB     Time Frame (Bed Mobility Goal 1, PT) long term goal (LTG);2 weeks  -MB       Row Name 01/02/24 1331          Transfer Goal 1 (PT)    Activity/Assistive Device (Transfer Goal 1, PT) transfers, all  -MB     [place] Level/Cues Needed (Transfer Goal 1, PT) standby assist  -MB     Time Frame (Transfer Goal 1, PT) long term goal (LTG);2 weeks  -MB       Row Name 01/02/24 1331          Gait Training Goal 1 (PT)    Activity/Assistive Device (Gait Training Goal 1, PT) gait (walking locomotion)  -MB     [place] Level (Gait Training Goal 1, PT) contact guard required  -MB     Distance (Gait Training Goal 1, PT) 50  -MB     Time Frame (Gait Training Goal 1, PT) long term goal (LTG);2 weeks  -MB       Row Name 01/02/24 1331          Therapy Assessment/Plan (PT)    Planned Therapy Interventions (PT) balance training;bed  mobility training;gait training;home exercise program;neuromuscular re-education;patient/family education;strengthening;transfer training  -MB               User Key  (r) = Recorded By, (t) = Taken By, (c) = Cosigned By      Initials Name Provider Type    Kunal Jamil, PT Physical Therapist                   Clinical Impression       Row Name 01/02/24 1330          Pain Scale: FACES Pre/Post-Treatment    Pain: FACES Scale, Pretreatment 0-->no hurt  -MB     Posttreatment Pain Rating 0-->no hurt  -MB       Row Name 01/02/24 1340 01/02/24 1330       Plan of Care Review    Plan of Care Reviewed With -- patient  -MB    Progress -- improving  -MB    Outcome Evaluation Pt is a 39 y/o M admitted to [facility] on 12/29/23 with complaints of acute L-sided weakness combined with witnessed seizure. Pt received TNK 12/29/23 at 22:49. CT Head (+) for encephalomalacia involving right posterior parietal lobe, left frontal lobe and left temporal lobe. MRI Brain (-) for acute brain ischemia. CT Head on 12/29/23 (+) for new small posterior left frontal parafalcine subdural hematoma with repeat CT Head on 12/31/23 showing stable 4 mm subdural hematoma. 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  He is currently incarcerated, but recently released from residential as of 12/31/23 due to medical concerns. Apparently he was living with spouse prior to nacho incarcerated ad was IND with ADLs and mobility with no AD. Unsure about current home environment post-dismissal from residential. This date, he is only oriented to self and has trouble following commands from PT. He exhibits erratic, spontaneous, impulsive behavior throughout session, although easily correctable. Bed mobility min A and transfers min A, while not safe for ambulation due to poor postural control and poor dynamic balance. HR ranging from 101-142bpm throughout session. He  is well below baseline and is not safe to return home regardless of current home environment. Recommending SNF at d/c and PT will follow. If denied from SNF, will req 24/7 assist from family/spouse.  -MB --      Row Name 01/02/24 1330          Therapy Assessment/Plan (PT)    Rehab Potential (PT) good, to achieve stated therapy goals  -MB     Criteria for Skilled Interventions Met (PT) yes;meets criteria  -MB     Therapy Frequency (PT) 5 times/wk  -MB     Predicted Duration of Therapy Intervention (PT) until d/c  -MB       Row Name 01/02/24 1330          Vital Signs    Pre Systolic BP Rehab 126  -MB     Pre Treatment Diastolic BP 64  -MB     Pretreatment Heart Rate (beats/min) 101  -MB     Intratreatment Heart Rate (beats/min) 142  -MB     Posttreatment Heart Rate (beats/min) 105  -MB     O2 Delivery Pre Treatment room air  -MB     O2 Delivery Intra Treatment room air  -MB     O2 Delivery Post Treatment room air  -MB     Pre Patient Position Supine  -MB     Intra Patient Position Standing  -MB     Post Patient Position Supine  -MB       Row Name 01/02/24 1330          Positioning and Restraints    Pre-Treatment Position in bed  -MB     Post Treatment Position bed  -MB     In Bed notified nsg;supine;call light within reach;encouraged to call for assist;exit alarm on  -MB               User Key  (r) = Recorded By, (t) = Taken By, (c) = Cosigned By      Initials Name Provider Type    Kunal Jamil, PT Physical Therapist                   Outcome Measures       Row Name 01/02/24 1332 01/02/24 0800       How much help from another person do you currently need...    Turning from your back to your side while in flat bed without using bedrails? 3  -MB 2  -DP    Moving from lying on back to sitting on the side of a flat bed without bedrails? 3  -MB 2  -DP    Moving to and from a bed to a chair (including a wheelchair)? 3  -MB 2  -DP    Standing up from a chair using your arms (e.g., wheelchair, bedside chair)? 3  -MB 2  -DP     Climbing 3-5 steps with a railing? 2  -MB 2  -DP    To walk in hospital room? 2  -MB 2  -DP    AM-PAC 6 Clicks Score (PT) 16  -MB 12  -DP    Highest Level of Mobility Goal 5 --> Static standing  -MB 4 --> Transfer to chair/commode  -DP      Row Name 01/02/24 0500          How much help from another person do you currently need...    Turning from your back to your side while in flat bed without using bedrails? 2  -JL     Moving from lying on back to sitting on the side of a flat bed without bedrails? 2  -JL     Moving to and from a bed to a chair (including a wheelchair)? 2  -JL     Standing up from a chair using your arms (e.g., wheelchair, bedside chair)? 2  -JL     Climbing 3-5 steps with a railing? 2  -JL     To walk in hospital room? 2  -JL     AM-PAC 6 Clicks Score (PT) 12  -JL     Highest Level of Mobility Goal 4 --> Transfer to chair/commode  -JL       Row Name 01/02/24 1332 01/02/24 1310       Modified Dariel Scale    Modified [place] Scale 4 - Moderately severe disability.  Unable to walk without assistance, and unable to attend to own bodily needs without assistance.  -MB 5 - Severe disability.  Bedridden, incontinent, and requiring constant nursing care and attention.  -MM      Row Name 01/02/24 1310          Functional Assessment    Outcome Measure Options AM-PAC 6 Clicks Daily Activity (OT);Modified [place]  -MM               User Key  (r) = Recorded By, (t) = Taken By, (c) = Cosigned By      Initials Name Provider Type    Yessenia Silverio, RN Registered Nurse    Jayme Haines, OT Occupational Therapist    Amaya Casillas, RN Registered Nurse    Kunal Jamil, PT Physical Therapist                                 Physical Therapy Education       Title: PT OT SLP Therapies (In Progress)       Topic: Physical Therapy (Done)       Point: Mobility training (Done)       Learning Progress Summary             Patient Acceptance, E,TB, VU by MB at 1/2/2024 1332                         Point:  Body mechanics (Done)       Learning Progress Summary             Patient Acceptance, E,TB, VU by MB at 1/2/2024 1332                         Point: Precautions (Done)       Learning Progress Summary             Patient Acceptance, E,TB, VU by MB at 1/2/2024 1332                                         User Key       Initials Effective Dates Name Provider Type Discipline    MB 06/06/23 -  Kunal Meyer, PT Physical Therapist PT                  PT Recommendation and Plan  Planned Therapy Interventions (PT): balance training, bed mobility training, gait training, home exercise program, neuromuscular re-education, patient/family education, strengthening, transfer training  Plan of Care Reviewed With: patient  Progress: improving  Outcome Evaluation: Pt is a 39 y/o M admitted to [facility] on 12/29/23 with complaints of acute L-sided weakness combined with witnessed seizure. Pt received TNK 12/29/23 at 22:49. CT Head (+) for encephalomalacia involving right posterior parietal lobe, left frontal lobe and left temporal lobe. MRI Brain (-) for acute brain ischemia. CT Head on 12/29/23 (+) for new small posterior left frontal parafalcine subdural hematoma with repeat CT Head on 12/31/23 showing stable 4 mm subdural hematoma. 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  He is currently incarcerated, but recently released from correction as of 12/31/23 due to medical concerns. Apparently he was living with spouse prior to nacho incarcerated ad was IND with ADLs and mobility with no AD. Unsure about current home environment post-dismissal from correction. This date, he is only oriented to self and has trouble following commands from PT. He exhibits erratic, spontaneous, impulsive behavior throughout session, although easily correctable. Bed mobility min A and transfers min A, while not safe for ambulation due to poor postural  control and poor dynamic balance. HR ranging from 101-142bpm throughout session. He is well below baseline and is not safe to return home regardless of current home environment. Recommending SNF at d/c and PT will follow. If denied from SNF, will req 24/7 assist from family/spouse.     Time Calculation:   PT Evaluation Complexity  History, PT Evaluation Complexity: 1-2 personal factors and/or comorbidities  Examination of Body Systems (PT Eval Complexity): total of 3 or more elements  Clinical Presentation (PT Evaluation Complexity): evolving  Clinical Decision Making (PT Evaluation Complexity): moderate complexity  Overall Complexity (PT Evaluation Complexity): moderate complexity     PT Charges       Row Name 01/02/24 1332             Time Calculation    Start Time 0925  -MB      Stop Time 0948  -MB      Time Calculation (min) 23 min  -MB      PT Received On 01/02/24  -MB      PT - Next Appointment 01/03/24  -MB      PT Goal Re-Cert Due Date 01/16/24  -MB         Time Calculation- PT    Total Timed Code Minutes- PT 0 minute(s)  -MB                User Key  (r) = Recorded By, (t) = Taken By, (c) = Cosigned By      Initials Name Provider Type    Kunal Jamil, PT Physical Therapist                  Therapy Charges for Today       Code Description Service Date Service Provider Modifiers Qty    [id number] HC PT EVAL MOD COMPLEXITY 4 1/2/2024 Kunal Meyer, PT GP 1            PT G-Codes  Outcome Measure Options: AM-PAC 6 Clicks Daily Activity (OT), Modified [place]  AM-PAC 6 Clicks Score (PT): 16  AM-PAC 6 Clicks Score (OT): 13  Modified [place] Scale: 4 - Moderately severe disability.  Unable to walk without assistance, and unable to attend to own bodily needs without assistance.  PT Discharge Summary  Anticipated Discharge Disposition (PT): skilled nursing facility    Kunal Meyer PT  1/2/2024

## 2024-01-02 NOTE — CONSULTS
Referring Provider:     Babak Miller DO     Reason for Consultation: Possible SI    Chief complaint : confusion     Subjective .     History of present illness:  The patient is a 38 y.o. male who was admitted secondary to seizure disorder, CVA, AMS.  Medical hx includes TBI, seizures, drug and alcohol abuse, OD, anxiety and depression.  Psychiatry consulted due to hx of SI and suspected intentional OD prior to admission.     Previous psychiatric notes reviewed in chart   Pt has a hx of VH, paranoia.  Previous medications tried seroquel (too sedating), vraylar (ineffective)  Unclear if AH and paranoia is related to substance use or TBI    Pt recently incarcerated prior to admission   Hx of methamphetamine abuse as well as other substances    Tox on admission positive for benzodiazapine  Pt recently received Midazolam, confused  No family at bedside   Admitted with acute encephalopathy, seizure disorder, CVA  Per RN staff pt has been confused and restless   Sitter at bedside currently for safety   Not agitated or aggressive  Pt oriented to self   Care is being provided to pt by staff, interview limited   Per RN staff pt has a hx of intentional overdose   Pt has not expressed SI during this admission   Pt has not expressed or displayed any self injurious behavior       Review of Systems   Review of systems could not be obtained due to   patient confusion.    The following portions of the patient's history were reviewed and updated as appropriate: allergies, current medications, past family history, past medical history, past social history, past surgical history and problem list.    History    Past psychiatric history     Past Medical History:   Diagnosis Date    Epilepsy     Headache     Hepatitis C virus infection 4/26/2021    Memory dysfunction following head trauma     Methicillin resistant Staphylococcus aureus infection 9/30/2018    Seizures     Stroke           Family History   Family history unknown: Yes         Social History     Tobacco Use    Smoking status: Every Day     Packs/day: 1.00     Years: 15.00     Additional pack years: 0.00     Total pack years: 15.00     Types: Cigarettes     Passive exposure: Current    Smokeless tobacco: Current     Types: Chew   Vaping Use    Vaping Use: Every day    Substances: Nicotine    Devices: Disposable   Substance Use Topics    Alcohol use: Yes     Comment: 1-2 drinks p/ wk    Drug use: Not Currently     Comment: 2022          Medications Prior to Admission   Medication Sig Dispense Refill Last Dose    Cariprazine HCl (Vraylar) 4.5 MG capsule capsule Take 1 capsule by mouth Daily. 30 capsule 1 12/29/2023    divalproex (DEPAKOTE) 500 MG DR tablet Take 1 tablet by mouth 2 (Two) Times a Day.       levETIRAcetam (KEPPRA) 1000 MG tablet Take 1 tablet by mouth twice daily 60 tablet 0 12/29/2023    OLANZapine (zyPREXA) 5 MG tablet Take 1 tablet by mouth 2 (Two) Times a Day.       Narcan 4 MG/0.1ML nasal spray 2 (Two) Times a Day As Needed.   More than a month        Scheduled Meds:  atorvastatin, 80 mg, Oral, Nightly  cholecalciferol, 2,000 Units, Oral, Daily  folic acid, 1 mg, Oral, Daily  Lacosamide, 200 mg, Intravenous, Q12H  levETIRAcetam, 1,500 mg, Intravenous, Q12H  levOCARNitine, 500 mg, Oral, Q12H  melatonin, 5 mg, Oral, Nightly  nicotine, 1 patch, Transdermal, Q24H  sodium chloride, 10 mL, Intravenous, Q12H  thiamine (B-1) IV, 200 mg, Intravenous, Q8H   Followed by  [START ON 1/6/2024] thiamine, 100 mg, Oral, Daily  valproate, 750 mg, Oral, Q6H  vitamin B-6, 100 mg, Oral, Daily         Continuous Infusions:       PRN Meds:    LORazepam **OR** midazolam **OR** LORazepam **OR** midazolam **OR** midazolam **OR** midazolam    Magnesium Standard Dose Replacement - Follow Nurse / BPA Driven Protocol    sodium chloride    sodium chloride    sodium chloride      Allergies:  Patient has no known allergies.      Objective     Vital Signs   /66   Pulse 111   Temp 97.7 °F  "(36.5 °C) (Oral)   Resp 16   Ht 175.3 cm (69\")   Wt 85.7 kg (189 lb)   SpO2 99%   BMI 27.91 kg/m²     Physical Exam:    Muscle strength and tone: moderate, ue's, equal bilaterally  Abnormal Movements: none observed   Gait: jose alfredo      General Appearance:    Upright, awake, alert,         Mental Status Exam:   Hygiene:   good  Cooperation:  Cooperative  Eye Contact:  Fair  Psychomotor Behavior:  Slow  Affect:  Restricted  Mood: normal  Speech: poor articulation   Thought Process:  Unable to demonstrate  Thought Content:  Unable to demonstrate  Suicidal:   Pt did not express   Homicidal:   Pt did not express   Hallucinations:  Not demonstrated today  Delusion:  Unable to demonstrate  Memory:  Unable to evaluate  Orientation:  Person  Reliability:  poor  Insight:  Poor  Judgement:  Impaired  Impulse Control:  Impaired  Physical/Medical Issues:  Yes          Medications and allergies reviewed     Result Review:  I have personally reviewed the results from the time of this admission to 1/2/2024 14:42 EST and agree with these findings:  [x]  Laboratory  []  Microbiology  []  Radiology  [x]  EKG/Telemetry   []  Cardiology/Vascular   []  Pathology  []  Old records  []  Other:      Assessment & Plan       CVA (cerebral vascular accident)      Assessment: Confusion related to medical condition (CVA, seizure disorder, TBI)     Treatment Plan: Pt has a hx of drug OD suspected by family to have been intentional. OD occurred prior to this admission.   Pt has been incarcerated leading up to admission for substance abuse   Pt has a hx of TBI, now has CVA.   Psychiatry following to assess SI  Pt confused, interview limited at this time.   Pt has not expressed SI   No safety concerns reported by staff   Sitter in place now due to restlessness and confusion   Pt not agitated or aggressive   Hx of AH prior to admission   Pt on CIWA protocol   Seizure precautions   Will attempt better interview tmrw   Will follow     Treatment Plan " discussed with:  RN staff         I discussed the patients findings and my recommendations with nursing staff    I have reviewed and approved the behavioral health treatment plans and problem list. Yes  Thank you for the consult   Referring MD has access to consult report and progress notes in EMR     Barb Hammonds DNP, APRN  01/02/24  14:42 EST

## 2024-01-03 LAB
ALBUMIN SERPL-MCNC: 3.7 G/DL (ref 3.5–5.2)
ALBUMIN/GLOB SERPL: 1.4 G/DL
ALP SERPL-CCNC: 44 U/L (ref 39–117)
ALT SERPL W P-5'-P-CCNC: 15 U/L (ref 1–41)
ANION GAP SERPL CALCULATED.3IONS-SCNC: 10 MMOL/L (ref 5–15)
AST SERPL-CCNC: 17 U/L (ref 1–40)
BASOPHILS # BLD AUTO: 0 10*3/MM3 (ref 0–0.2)
BASOPHILS NFR BLD AUTO: 0.6 % (ref 0–1.5)
BILIRUB SERPL-MCNC: 0.5 MG/DL (ref 0–1.2)
BUN SERPL-MCNC: 13 MG/DL (ref 6–20)
BUN/CREAT SERPL: 19.7 (ref 7–25)
CALCIUM SPEC-SCNC: 9 MG/DL (ref 8.6–10.5)
CHLORIDE SERPL-SCNC: 107 MMOL/L (ref 98–107)
CO2 SERPL-SCNC: 25 MMOL/L (ref 22–29)
CREAT SERPL-MCNC: 0.66 MG/DL (ref 0.76–1.27)
DEPRECATED RDW RBC AUTO: 48.6 FL (ref 37–54)
EGFRCR SERPLBLD CKD-EPI 2021: 122.4 ML/MIN/1.73
EOSINOPHIL # BLD AUTO: 0.3 10*3/MM3 (ref 0–0.4)
EOSINOPHIL NFR BLD AUTO: 3.8 % (ref 0.3–6.2)
ERYTHROCYTE [DISTWIDTH] IN BLOOD BY AUTOMATED COUNT: 15.4 % (ref 12.3–15.4)
GLOBULIN UR ELPH-MCNC: 2.7 GM/DL
GLUCOSE SERPL-MCNC: 92 MG/DL (ref 65–99)
HCT VFR BLD AUTO: 41.8 % (ref 37.5–51)
HGB BLD-MCNC: 14.3 G/DL (ref 13–17.7)
LYMPHOCYTES # BLD AUTO: 2.7 10*3/MM3 (ref 0.7–3.1)
LYMPHOCYTES NFR BLD AUTO: 36.5 % (ref 19.6–45.3)
MCH RBC QN AUTO: 29.5 PG (ref 26.6–33)
MCHC RBC AUTO-ENTMCNC: 34.1 G/DL (ref 31.5–35.7)
MCV RBC AUTO: 86.6 FL (ref 79–97)
MONOCYTES # BLD AUTO: 0.8 10*3/MM3 (ref 0.1–0.9)
MONOCYTES NFR BLD AUTO: 11.3 % (ref 5–12)
NEUTROPHILS NFR BLD AUTO: 3.5 10*3/MM3 (ref 1.7–7)
NEUTROPHILS NFR BLD AUTO: 47.8 % (ref 42.7–76)
NRBC BLD AUTO-RTO: 0 /100 WBC (ref 0–0.2)
PLATELET # BLD AUTO: 197 10*3/MM3 (ref 140–450)
PMV BLD AUTO: 9.5 FL (ref 6–12)
POTASSIUM SERPL-SCNC: 3.6 MMOL/L (ref 3.5–5.2)
PROT SERPL-MCNC: 6.4 G/DL (ref 6–8.5)
RBC # BLD AUTO: 4.83 10*6/MM3 (ref 4.14–5.8)
SODIUM SERPL-SCNC: 142 MMOL/L (ref 136–145)
VALPROATE SERPL-MCNC: 99.9 MCG/ML (ref 50–125)
WBC NRBC COR # BLD AUTO: 7.3 10*3/MM3 (ref 3.4–10.8)

## 2024-01-03 PROCEDURE — 25010000002 LEVETRIRACETAM PER 10 MG: Performed by: PSYCHIATRY & NEUROLOGY

## 2024-01-03 PROCEDURE — 92526 ORAL FUNCTION THERAPY: CPT

## 2024-01-03 PROCEDURE — 80164 ASSAY DIPROPYLACETIC ACD TOT: CPT | Performed by: NURSE PRACTITIONER

## 2024-01-03 PROCEDURE — 25010000002 MIDAZOLAM PER 1 MG: Performed by: NURSE PRACTITIONER

## 2024-01-03 PROCEDURE — 99232 SBSQ HOSP IP/OBS MODERATE 35: CPT | Performed by: PSYCHIATRY & NEUROLOGY

## 2024-01-03 PROCEDURE — 25010000002 LACOSAMIDE 200 MG/20ML SOLUTION: Performed by: PSYCHIATRY & NEUROLOGY

## 2024-01-03 PROCEDURE — 25010000002 THIAMINE HCL 200 MG/2ML SOLUTION: Performed by: NURSE PRACTITIONER

## 2024-01-03 PROCEDURE — C9254 INJECTION, LACOSAMIDE: HCPCS | Performed by: PSYCHIATRY & NEUROLOGY

## 2024-01-03 PROCEDURE — 85025 COMPLETE CBC W/AUTO DIFF WBC: CPT | Performed by: FAMILY MEDICINE

## 2024-01-03 PROCEDURE — 80053 COMPREHEN METABOLIC PANEL: CPT | Performed by: FAMILY MEDICINE

## 2024-01-03 RX ADMIN — VALPROIC ACID 750 MG: 250 SOLUTION ORAL at 14:59

## 2024-01-03 RX ADMIN — LEVOCARNITINE 500 MG: 1 SOLUTION ORAL at 17:16

## 2024-01-03 RX ADMIN — THIAMINE HYDROCHLORIDE 200 MG: 100 INJECTION, SOLUTION INTRAMUSCULAR; INTRAVENOUS at 21:56

## 2024-01-03 RX ADMIN — LEVOCARNITINE 500 MG: 1 SOLUTION ORAL at 06:25

## 2024-01-03 RX ADMIN — Medication 2000 UNITS: at 08:01

## 2024-01-03 RX ADMIN — MIDAZOLAM 4 MG: 1 INJECTION INTRAMUSCULAR; INTRAVENOUS at 23:57

## 2024-01-03 RX ADMIN — LEVETIRACETAM 1500 MG: 100 INJECTION, SOLUTION INTRAVENOUS at 20:18

## 2024-01-03 RX ADMIN — THIAMINE HYDROCHLORIDE 200 MG: 100 INJECTION, SOLUTION INTRAMUSCULAR; INTRAVENOUS at 06:25

## 2024-01-03 RX ADMIN — Medication 10 ML: at 08:01

## 2024-01-03 RX ADMIN — LORAZEPAM 1 MG: 1 TABLET ORAL at 16:06

## 2024-01-03 RX ADMIN — FOLIC ACID 1 MG: 1 TABLET ORAL at 08:01

## 2024-01-03 RX ADMIN — VALPROIC ACID 750 MG: 250 SOLUTION ORAL at 20:17

## 2024-01-03 RX ADMIN — LACOSAMIDE 200 MG: 10 INJECTION, SOLUTION INTRAVENOUS at 06:24

## 2024-01-03 RX ADMIN — LEVETIRACETAM 1500 MG: 100 INJECTION, SOLUTION INTRAVENOUS at 08:14

## 2024-01-03 RX ADMIN — MIDAZOLAM 4 MG: 1 INJECTION INTRAMUSCULAR; INTRAVENOUS at 10:32

## 2024-01-03 RX ADMIN — VALPROIC ACID 750 MG: 250 SOLUTION ORAL at 02:45

## 2024-01-03 RX ADMIN — LACOSAMIDE 200 MG: 10 INJECTION, SOLUTION INTRAVENOUS at 17:16

## 2024-01-03 RX ADMIN — Medication 1 PATCH: at 08:01

## 2024-01-03 RX ADMIN — THIAMINE HYDROCHLORIDE 200 MG: 100 INJECTION, SOLUTION INTRAMUSCULAR; INTRAVENOUS at 14:00

## 2024-01-03 RX ADMIN — Medication 5 MG: at 20:17

## 2024-01-03 RX ADMIN — VALPROIC ACID 750 MG: 250 SOLUTION ORAL at 08:01

## 2024-01-03 RX ADMIN — ATORVASTATIN CALCIUM 80 MG: 40 TABLET, FILM COATED ORAL at 20:17

## 2024-01-03 RX ADMIN — Medication 100 MG: at 08:01

## 2024-01-03 RX ADMIN — MIDAZOLAM 4 MG: 1 INJECTION INTRAMUSCULAR; INTRAVENOUS at 14:15

## 2024-01-03 RX ADMIN — Medication 10 ML: at 20:18

## 2024-01-03 RX ADMIN — MIDAZOLAM 2 MG: 1 INJECTION INTRAMUSCULAR; INTRAVENOUS at 21:56

## 2024-01-03 RX ADMIN — MIDAZOLAM 4 MG: 1 INJECTION INTRAMUSCULAR; INTRAVENOUS at 02:44

## 2024-01-03 NOTE — SIGNIFICANT NOTE
01/03/24 1317   PASRR   PASRR Status Under clinical review  (PASRR referred for Level 2. Please allow 5-7 business days for completion.)   Level II Initiated 01/02/24

## 2024-01-03 NOTE — THERAPY TREATMENT NOTE
Acute Care - Speech Language Pathology   Swallow Treatment Note THAO Marquez     Patient Name: Neeraj Martin  : 1985  MRN: [id number]  Today's Date: 1/3/2024               Admit Date: 2023    Visit Dx:     ICD-10-CM ICD-9-CM   1. Seizure  R56.9 780.39   2. Aphasia  R47.01 784.3   3. Acute left-sided weakness  R53.1 728.87     Patient Active Problem List   Diagnosis    Hepatitis C virus infection    Methicillin resistant Staphylococcus aureus infection    CVA (cerebral vascular accident)     Past Medical History:   Diagnosis Date    Epilepsy     Headache     Hepatitis C virus infection 2021    Memory dysfunction following head trauma     Methicillin resistant Staphylococcus aureus infection 2018    Seizures     Stroke      Past Surgical History:   Procedure Laterality Date    BRAIN SURGERY Right 2020    HERNIA REPAIR         SLP Recommendation and Plan            EDUCATION  The patient has been educated in the following areas:   Dysphagia (Swallowing Impairment) Oral Care/Hydration.        SLP GOALS       Row Name 24 1600       (LTG) Patient will demonstrate functional swallow for    Diet Texture (Demonstrate functional swallow) regular textures  -CB    Liquid viscosity (Demonstrate functional swallow) thin liquids  -CB    [place] (Demonstrate functional swallow) with use of compensatory strategies  -CB    Time Frame (Demonstrate functional swallow) by discharge  -CB    Progress/Outcomes (Demonstrate functional swallow) goal ongoing  -CB    Comment (Demonstrate functional swallow) Patient was seen for DT/meal at lunch. Patient was sitting upright in bed during meal. Patient fed self. Patient is currently on a regular diet. Patient is managing regular diet without any difficulty. Patient able to take small and large bites without difficulty. Patient demonstrates adequate rotary chewing. Patient clears each bite completely with no evidence of oral residue. Patient tolerates thins without  overt s/s of aspiration. No wet vocal quality was detected. Patient was a bit weepy today. ST will continue to follow to assure safety and tolerance with least restrictive diet.  -CB       SLP Diagnostic Treatment     Time Frame (Diagnostic) short term goal (STG)  -CB       Respiratory Support Goal 1 (SLP)    Improve Respiratory Support Goal 1 (SLP) increasing length of exhalation;repeating a sentence on exhalation;90%;independently (over 90% accuracy)  -CB    Time Frame (Respiratory Support Goal 1, SLP) short term goal (STG)  -CB    Progress/Outcomes (Respiratory Support Goal 1, SLP) goal ongoing  -CB    Comment (Respiratory Support Goal 1, SLP) Reviewed compensatory strategies to utilize if listerner did not understand him by slowing down speech thereby pacing syllables, and over articulating as patient demonstrates mild dysarthria. -CB       Articulation Goal 1 (SLP)    Improve Articulation Goal 1 (SLP) of specific sounds in connected speech;by over-articulating in connected speech;90%;independently (over 90% accuracy)  -CB    Time Frame (Articulation Goal 1, SLP) short term goal (STG)  -CB    Progress/Outcomes (Articulation Goal 1, SLP) goal ongoing  -CB              User Key  (r) = Recorded By, (t) = Taken By, (c) = Cosigned By      Initials Name Provider Type    Nancy Mathis, SLP Speech and Language Pathologist                       Time Calculation:                CHEMA [place]  1/3/2024

## 2024-01-03 NOTE — PLAN OF CARE
Pt remains impulsive with sitter at bedside. IV infiltrated and replaced. CIWA scored high and required Versed PRN to remain in bed and to assist with hallucinations. Mother visited and brought pt new clothes. Pt received full bath and linen change. Pt had large BM this shift.       Problem: Skin Injury Risk Increased  Goal: Skin Health and Integrity  Outcome: Ongoing, Progressing  Intervention: Promote and Optimize Oral Intake  Recent Flowsheet Documentation  Taken 1/2/2024 1600 by Amaya Sotelo RN  Oral Nutrition Promotion: medicated  Taken 1/2/2024 1200 by Amaya Sotelo RN  Oral Nutrition Promotion: medicated  Taken 1/2/2024 0800 by Amaya Sotelo RN  Oral Nutrition Promotion: medicated  Intervention: Optimize Skin Protection  Recent Flowsheet Documentation  Taken 1/2/2024 1800 by Amaya Sotelo RN  Head of Bed ([facility]) Positioning: HOB at 30-45 degrees  Taken 1/2/2024 1600 by Amaya Sotelo RN  Pressure Reduction Techniques: frequent weight shift encouraged  Head of Bed ([facility]) Positioning: HOB at 30-45 degrees  Pressure Reduction Devices: positioning supports utilized  Skin Protection: adhesive use limited  Taken 1/2/2024 1400 by Amaya Sotelo RN  Head of Bed ([facility]) Positioning: HOB at 30-45 degrees  Taken 1/2/2024 1300 by Amaya Sotelo RN  Head of Bed ([facility]) Positioning: HOB at 30-45 degrees  Taken 1/2/2024 1200 by Amaya Sotelo RN  Pressure Reduction Techniques:   frequent weight shift encouraged   heels elevated off bed   pressure points protected   weight shift assistance provided  Head of Bed ([facility]) Positioning: HOB at 30-45 degrees  Pressure Reduction Devices:   positioning supports utilized   pressure-redistributing mattress utilized   heel offloading device utilized  Skin Protection:   adhesive use limited   tubing/devices free from skin contact   skin-to-device areas padded   pulse oximeter probe site changed  Taken 1/2/2024 1100 by Amaya Sotelo RN  Head of Bed ([facility])  Positioning: HOB at 30-45 degrees  Taken 1/2/2024 1000 by Amaya Sotelo RN  Head of Bed ([facility]) Positioning: HOB at 30-45 degrees  Taken 1/2/2024 0900 by Amaya Sotelo RN  Head of Bed ([facility]) Positioning: HOB at 30-45 degrees  Taken 1/2/2024 0800 by Amaya Sotelo RN  Pressure Reduction Techniques:   frequent weight shift encouraged   heels elevated off bed   pressure points protected   weight shift assistance provided  Head of Bed ([facility]) Positioning: HOB at 30-45 degrees  Pressure Reduction Devices:   positioning supports utilized   pressure-redistributing mattress utilized   heel offloading device utilized  Skin Protection:   adhesive use limited   tubing/devices free from skin contact   skin-to-device areas padded   pulse oximeter probe site changed  Taken 1/2/2024 0700 by Amaya Sotelo RN  Head of Bed ([facility]) Positioning: HOB at 30-45 degrees     Problem: Pain Acute  Goal: Acceptable Pain Control and Functional Ability  Outcome: Ongoing, Progressing  Intervention: Prevent or Manage Pain  Recent Flowsheet Documentation  Taken 1/2/2024 1800 by Amaya Sotelo RN  Medication Review/Management: medications reviewed  Taken 1/2/2024 1700 by Amaya Sotelo RN  Medication Review/Management: medications reviewed  Taken 1/2/2024 1600 by Amaya Sotelo RN  Sensory Stimulation Regulation: television on  Bowel Elimination Promotion: adequate fluid intake promoted  Sleep/Rest Enhancement:   consistent schedule promoted   awakenings minimized   natural light exposure provided   noise level reduced   regular sleep/rest pattern promoted   relaxation techniques promoted   room darkened  Medication Review/Management: medications reviewed  Taken 1/2/2024 1500 by Amaya Sotelo RN  Medication Review/Management: medications reviewed  Taken 1/2/2024 1400 by Amaya Sotelo RN  Medication Review/Management: medications reviewed  Taken 1/2/2024 1300 by Amaya Sotelo RN  Medication Review/Management:  medications reviewed  Taken 1/2/2024 1200 by Amaya Sotelo RN  Sensory Stimulation Regulation:   care clustered   quiet environment promoted   television on   visual stimulation minimized  Bowel Elimination Promotion:   adequate fluid intake promoted   privacy promoted   commode/bedpan at bedside  Sleep/Rest Enhancement:   consistent schedule promoted   awakenings minimized   natural light exposure provided   noise level reduced   regular sleep/rest pattern promoted   relaxation techniques promoted   room darkened  Medication Review/Management: medications reviewed  Taken 1/2/2024 1100 by Amaya Sotelo RN  Medication Review/Management: medications reviewed  Taken 1/2/2024 1000 by Amaya Sotelo RN  Medication Review/Management: medications reviewed  Taken 1/2/2024 0900 by Amaya Sotelo RN  Medication Review/Management: medications reviewed  Taken 1/2/2024 0800 by Amaya Sotelo RN  Sensory Stimulation Regulation:   care clustered   quiet environment promoted   television on   visual stimulation minimized  Bowel Elimination Promotion:   adequate fluid intake promoted   privacy promoted   commode/bedpan at bedside  Sleep/Rest Enhancement:   consistent schedule promoted   awakenings minimized   natural light exposure provided   noise level reduced   regular sleep/rest pattern promoted   relaxation techniques promoted   room darkened  Medication Review/Management: medications reviewed  Taken 1/2/2024 0700 by Amaya Sotelo RN  Medication Review/Management: medications reviewed  Intervention: Develop Pain Management Plan  Recent Flowsheet Documentation  Taken 1/2/2024 1600 by Amaya Sotelo RN  Pain Management Interventions:   quiet environment facilitated   position adjusted   pillow support provided  Taken 1/2/2024 1200 by Amaya Sotelo RN  Pain Management Interventions:   quiet environment facilitated   position adjusted   pillow support provided   care clustered  Taken 1/2/2024  0800 by Amaya Sotelo RN  Pain Management Interventions:   quiet environment facilitated   position adjusted   pillow support provided   care clustered  Intervention: Optimize Psychosocial Wellbeing  Recent Flowsheet Documentation  Taken 1/2/2024 1600 by Amaya Sotelo RN  Supportive Measures: active listening utilized  Diversional Activities: television  Spiritual Activities Assistance: affirmation provided  Taken 1/2/2024 1200 by Amaya Sotelo RN  Supportive Measures:   active listening utilized   self-care encouraged  Diversional Activities: television  Taken 1/2/2024 0800 by Amaya Sotelo RN  Supportive Measures:   active listening utilized   self-care encouraged  Diversional Activities: television     Problem: Fall Injury Risk  Goal: Absence of Fall and Fall-Related Injury  Outcome: Ongoing, Progressing  Intervention: Identify and Manage Contributors  Recent Flowsheet Documentation  Taken 1/2/2024 1800 by Amaya Sotelo RN  Medication Review/Management: medications reviewed  Taken 1/2/2024 1700 by Amaya Sotelo RN  Medication Review/Management: medications reviewed  Taken 1/2/2024 1600 by Amaya Sotelo RN  Medication Review/Management: medications reviewed  Self-Care Promotion: independence encouraged  Taken 1/2/2024 1500 by Amaya Sotelo RN  Medication Review/Management: medications reviewed  Taken 1/2/2024 1400 by Amaya Sotelo RN  Medication Review/Management: medications reviewed  Taken 1/2/2024 1300 by Amaya Sotelo RN  Medication Review/Management: medications reviewed  Self-Care Promotion: independence encouraged  Taken 1/2/2024 1200 by Amaya Sotelo RN  Medication Review/Management: medications reviewed  Self-Care Promotion: independence encouraged  Taken 1/2/2024 1100 by Amaya Sotelo RN  Medication Review/Management: medications reviewed  Self-Care Promotion: independence encouraged  Taken 1/2/2024 1000 by Amaya Sotelo, JOHN  Medication  Review/Management: medications reviewed  Self-Care Promotion: independence encouraged  Taken 1/2/2024 0900 by Amaya Sotelo RN  Medication Review/Management: medications reviewed  Self-Care Promotion: independence encouraged  Taken 1/2/2024 0800 by Amaya Sotelo RN  Medication Review/Management: medications reviewed  Self-Care Promotion: independence encouraged  Taken 1/2/2024 0700 by Amaya Sotelo RN  Medication Review/Management: medications reviewed  Self-Care Promotion: independence encouraged  Intervention: Promote Injury-Free Environment  Recent Flowsheet Documentation  Taken 1/2/2024 1600 by Amaya Sotelo RN  Safety Promotion/Fall Prevention:   safety round/check completed   toileting scheduled   room organization consistent   activity supervised   assistive device/personal items within reach   clutter free environment maintained   nonskid shoes/slippers when out of bed   lighting adjusted   fall prevention program maintained  Taken 1/2/2024 1400 by Amaya Sotelo RN  Safety Promotion/Fall Prevention:   safety round/check completed   toileting scheduled   room organization consistent   activity supervised   assistive device/personal items within reach   clutter free environment maintained   nonskid shoes/slippers when out of bed   lighting adjusted   fall prevention program maintained  Taken 1/2/2024 1300 by Amaya Sotelo RN  Safety Promotion/Fall Prevention:   safety round/check completed   toileting scheduled   room organization consistent   activity supervised   assistive device/personal items within reach   clutter free environment maintained   nonskid shoes/slippers when out of bed   lighting adjusted   fall prevention program maintained  Taken 1/2/2024 1200 by Amaya Sotelo RN  Safety Promotion/Fall Prevention:   safety round/check completed   toileting scheduled   room organization consistent   activity supervised   assistive device/personal items within reach    clutter free environment maintained   nonskid shoes/slippers when out of bed   lighting adjusted   fall prevention program maintained  Taken 1/2/2024 1100 by Amaya Sotelo RN  Safety Promotion/Fall Prevention:   safety round/check completed   toileting scheduled   room organization consistent   activity supervised   assistive device/personal items within reach   clutter free environment maintained   nonskid shoes/slippers when out of bed   lighting adjusted   fall prevention program maintained  Taken 1/2/2024 1000 by Amaya Sotelo RN  Safety Promotion/Fall Prevention:   safety round/check completed   toileting scheduled   room organization consistent   activity supervised   assistive device/personal items within reach   clutter free environment maintained   nonskid shoes/slippers when out of bed   lighting adjusted   fall prevention program maintained  Taken 1/2/2024 0900 by Amaya Sotelo RN  Safety Promotion/Fall Prevention:   safety round/check completed   toileting scheduled   room organization consistent   activity supervised   assistive device/personal items within reach   clutter free environment maintained   nonskid shoes/slippers when out of bed   lighting adjusted   fall prevention program maintained  Taken 1/2/2024 0800 by Amaya Sotelo RN  Safety Promotion/Fall Prevention:   safety round/check completed   toileting scheduled   room organization consistent   activity supervised   assistive device/personal items within reach   clutter free environment maintained   nonskid shoes/slippers when out of bed   lighting adjusted   fall prevention program maintained  Taken 1/2/2024 0700 by Amaya Sotelo RN  Safety Promotion/Fall Prevention:   safety round/check completed   toileting scheduled   room organization consistent   activity supervised   assistive device/personal items within reach   clutter free environment maintained   nonskid shoes/slippers when out of bed   lighting adjusted    fall prevention program maintained     Problem: Adult Inpatient Plan of Care  Goal: Plan of Care Review  Outcome: Ongoing, Progressing   Goal Outcome Evaluation:

## 2024-01-03 NOTE — SIGNIFICANT NOTE
01/03/24 0745   Living Situation   Current Living Arrangements home   Potentially Unsafe Housing Conditions none   Food Insecurity   Within the past 12 months, you worried that your food would run out before you got the money to buy more. Never true   Within the past 12 months, the food you bought just didn't last and you didn't have money to get more. Never true   Transportation Needs   In the past 12 months, has lack of transportation kept you from meetings, work, or from getting things needed for daily living? No   Utilities   In the past 12 months has the electric, gas, oil, or water company threatened to shut off services in your home? No   Abuse Screen (yes response referral indicated)   Feels Unsafe at Home or Work/School no   Feels Threatened by Someone no   Does Anyone Try to Keep You From Having Contact with Others or Doing Things Outside Your Home? no   Physical Signs of Abuse Present no   Financial Resource Strain   How hard is it for you to pay for the very basics like food, housing, medical care, and heating? Not hard   Employment   Do you want help finding or keeping work or a job? I do not need or want help   Family and Community Support   If for any reason you need help with day-to-day activities such as bathing, preparing meals, shopping, managing finances, etc., do you get the help you need? I don't need any help   How often do you feel lonely or isolated from those around you? Never   Education   Preferred Language English   Do you want help with school or training? For example, starting or completing job training or getting a high school diploma, GED or equivalent No   Physical Activity   On average, how many days per week do you engage in moderate to strenuous exercise (like a brisk walk)? 5 days   On average, how many minutes do you engage in exercise at this level? 60 min   Number of minutes of exercise per week 300   Alcohol Use   Q1: How often do you have a drink containing alcohol? 2-4  pr month   Q2: How many drinks containing alcohol do you have on a typical day when you are drinking? 3 or 4   Q3: How often do you have six or more drinks on one occasion? Less than mo   Stress   Do you feel stress - tense, restless, nervous, or anxious, or unable to sleep at night because your mind is troubled all the time - these days? Only a littl   Mental Health   Feeling Down, Depressed or Hopeless 0-->not at all   Disabilities   Concentrating, Remembering or Making Decisions Difficulty no   Doing Errands Independently Difficulty (such as shopping) no

## 2024-01-03 NOTE — PROGRESS NOTES
[facility]   Daily Progress Note    Patient Name: Neeraj Martin  : 1985  MRN: [id number]  Primary Care Physician:  Nanette Sterling, APRN  Date of admission: 2023    Subjective      History of Present Illness:   Per the documentation by Dr. Morgan, dated 2023, patient is a 38 y.o. male with PMH of traumatic brain injury, seizures, alcohol abuse, drug abuse presented to the hospital for altered mental status and was admitted with a principal diagnosis of CVA (cerebral vascular accident).  Presumed to be acute stroke versus seizure, loaded with Keppra.  CT head with areas of encephalomalacia in right parietal lobe, left frontotemporal areas.  CTA with no large vessel occlusion.  Neurology was consulted, s/p tenecteplase.  MRI brain with no evidence of acute infarct but did show multifocal encephalomalacia in the right parietal, left frontotemporal lobes.  Subsequently, repeat CT head in 24 hours showed small left frontal parafalcine subdural hematoma without mass effect.  Neurosurgery was consulted and recommended follow-up in office in 4 weeks.      2023 patient to be transferred to [facility] and care was taken over by hospitalist service.    He was furloughed from long term so plans to discharge home when he leaves here.  No seizures since .  Has not seen neurology or psychiatry yet today.  His echo showed stage I diastolic dysfunction but was otherwise okay.  Hoping to go home soon.  Extensive chart review for service turnover today. Discussed the case with the bedside nursing staff. The patient's case was also discussed with the pharmacy and case management providers at multidisciplinary rounds. No other acute concerns.  Mother is at the bedside.    Objective      Vitals:   Temp:  [97.6 °F (36.4 °C)-98.3 °F (36.8 °C)] 98.3 °F (36.8 °C)  Heart Rate:  [] 70  Resp:  [13-19] 16  BP: ()/(49-83) 116/70    Physical Exam   GEN: Disheveled, no acute distress,  soft restraints in place  HEENT: NCAT, PERRLA, moist mucous membranes  NECK: Supple, midline trachea  CARD: RRR, no appreciable M/R/G, no peripheral edema  PULM: Fairly CTAB, non-distressed  ABD: soft, NTND, normoactive bowel sounds throughout  NEURO: Grossly intact, chronic left hand weakness  PSYCH: Pleasant    Result Review    I have personally reviewed the results from the time of this admission to 1/3/2024 10:07 EST and agree with these findings:  [x]  Laboratory  [x]  Microbiology  [x]  Radiology  [x]  EKG/Telemetry   [x]  Cardiology/Vascular   []  Pathology  [x]  Old records  []  Other:      Assessment & Plan        atorvastatin, 80 mg, Oral, Nightly  cholecalciferol, 2,000 Units, Oral, Daily  folic acid, 1 mg, Oral, Daily  Lacosamide, 200 mg, Intravenous, Q12H  levETIRAcetam, 1,500 mg, Intravenous, Q12H  levOCARNitine, 500 mg, Oral, Q12H  melatonin, 5 mg, Oral, Nightly  nicotine, 1 patch, Transdermal, Q24H  sodium chloride, 10 mL, Intravenous, Q12H  thiamine (B-1) IV, 200 mg, Intravenous, Q8H   Followed by  [START ON 1/6/2024] thiamine, 100 mg, Oral, Daily  valproate, 750 mg, Oral, Q6H  vitamin B-6, 100 mg, Oral, Daily             Active Hospital Problems:  Active Hospital Problems    Diagnosis     **CVA (cerebral vascular accident)      Plan:   Acute encephalopathy  Suspected stroke versus Jonn's paralysis versus seizures.  Neurology is primary management.      Seizure disorder - no further seizure today.   On Keppra and Depakote.  Neurology following. Vimpat was added.      Left frontal subdural hematoma  Likely a complication of tenecteplase.  Neurosurgery recommended conservative management and follow-up in their office in 4 weeks.     Traumatic brain injury/history of stroke: Hold aspirin at this time with subdural.  Dyslipidemia: Chronic and stable. Continue statins.     Discoloration of left upper extremity  likely secondary to handcuff placement, length and arm position, patient was in police custody  and noted to have bilateral upper and lower metal cuffs in place      DVT prophylaxis:  Mechanical DVT prophylaxis orders are present.    CODE STATUS:    Code Status (Patient has no pulse and is not breathing): CPR (Attempt to Resuscitate)  Medical Interventions (Patient has pulse or is breathing): Full Support    Disposition:  I expect patient to be discharged home tomorrow, +/- home health care.  Patient and mother want him to go home.  He has been furloughed from intermediate so is cleared to discharge from a legal standpoint.  We will clarify the psychiatric and neurologic plans of care for potential discharge tomorrow.    Electronically signed by Leroy Centeno MD, 01/03/24, 10:07 EST.  [facility] Hospitalist Team

## 2024-01-03 NOTE — SIGNIFICANT NOTE
01/03/24 1215   OTHER   Discipline physical therapist   Rehab Time/Intention   Session Not Performed unable to treat, medical status change  (Pt now currently in restraints due to impulsive, aggressive behavior. PT will hold treatment session until cognition improves.)   Therapy Assessment/Plan (PT)   Criteria for Skilled Interventions Met (PT) yes;meets criteria   Recommendation   PT - Next Appointment 01/04/24

## 2024-01-03 NOTE — PROGRESS NOTES
"  Chief complaint \"I had seizures\"     Subjective .     History of present illness:  The patient is a 38 y.o. male who was admitted secondary to   seizure disorder, CVA, AMS.  PMHx:TBI, seizures, drug and alcohol abuse, OD, anxiety and depression.  Psychiatry consulted due to hx of SI and suspected intentional OD prior to admission.      Previous psychiatric notes reviewed in chart   Pt has a hx of VH, paranoia.  Past psych medications :  seroquel (too sedating), vraylar (ineffective)  Unclear if AH and paranoia is related to substance use or TBI     Pt recently incarcerated prior to admission   Hx of methamphetamine abuse as well as other substances    Tox on admission positive for benzodiazapine  Pt recently received Midazolam, confused  No family at bedside   Admitted with acute encephalopathy, seizure disorder, CVA  Per RN staff pt has been confused and restless   Sitter at bedside currently for safety   Not agitated or aggressive  Pt oriented to self      Today the pt was awake, oriented  to self, situation , place (with hints)   He knew he had SZ, he was restless, with very short attention span   No agitation, no combative behavior  The pt denied any intention to kill himself           Review of Systems   Pertinent items are noted in HPI, all other systems reviewed and negative    History        Medications Prior to Admission   Medication Sig Dispense Refill Last Dose    Cariprazine HCl (Vraylar) 4.5 MG capsule capsule Take 1 capsule by mouth Daily. 30 capsule 1 12/29/2023    divalproex (DEPAKOTE) 500 MG DR tablet Take 1 tablet by mouth 2 (Two) Times a Day.       levETIRAcetam (KEPPRA) 1000 MG tablet Take 1 tablet by mouth twice daily 60 tablet 0 12/29/2023    OLANZapine (zyPREXA) 5 MG tablet Take 1 tablet by mouth 2 (Two) Times a Day.       Narcan 4 MG/0.1ML nasal spray 2 (Two) Times a Day As Needed.   More than a month        Scheduled Meds:  atorvastatin, 80 mg, Oral, Nightly  cholecalciferol, 2,000 Units, " "Oral, Daily  folic acid, 1 mg, Oral, Daily  Lacosamide, 200 mg, Intravenous, Q12H  levETIRAcetam, 1,500 mg, Intravenous, Q12H  levOCARNitine, 500 mg, Oral, Q12H  melatonin, 5 mg, Oral, Nightly  nicotine, 1 patch, Transdermal, Q24H  sodium chloride, 10 mL, Intravenous, Q12H  thiamine (B-1) IV, 200 mg, Intravenous, Q8H   Followed by  [START ON 1/6/2024] thiamine, 100 mg, Oral, Daily  valproate, 750 mg, Oral, Q6H  vitamin B-6, 100 mg, Oral, Daily         Continuous Infusions:       PRN Meds:    LORazepam **OR** midazolam **OR** LORazepam **OR** midazolam **OR** midazolam **OR** midazolam    Magnesium Standard Dose Replacement - Follow Nurse / BPA Driven Protocol    sodium chloride    sodium chloride    sodium chloride      Allergies:  Patient has no known allergies.      Objective     Vital Signs   /61 (BP Location: Left arm, Patient Position: Lying)   Pulse 84   Temp 97.8 °F (36.6 °C) (Oral)   Resp 14   Ht 175.3 cm (69\")   Wt 85.7 kg (189 lb)   SpO2 97%   BMI 27.91 kg/m²     Physical Exam:     General Appearance:    In NAD       Mental Status Exam:    Hygiene:   good  Cooperation:   attempted to be cooperative   Eye Contact:  Fair  Psychomotor Behavior:  Restless  Affect:  Blunted  Hopelessness: Denies  Speech:  Normal  Thought Progress:  Disorganized  Thought Content:  Mood congruent  Suicidal:  None  Homicidal:  None  Hallucinations:  Not demonstrated today  Delusion:  None  Memory:  Deficits  Orientation:  Person and Situation  Reliability:  poor  Insight:  Poor  Judgement:  Poor  Impulse Control:  Poor  Physical/Medical Issues:  Yes      Medications and allergies reviewed      Lab Results   Component Value Date    GLUCOSE 86 01/02/2024    CALCIUM 9.0 01/02/2024     01/02/2024    K 4.0 01/02/2024    CO2 30.0 (H) 01/02/2024     01/02/2024    BUN 12 01/02/2024    CREATININE 0.84 01/02/2024    BCR 14.3 01/02/2024    ANIONGAP 8.0 01/02/2024       Last Urine Toxicity  More data exists         " "Latest Ref Rng & Units 12/30/2023 8/7/2023   LAST URINE TOXICITY RESULTS   Barbiturates Screen, Urine Negative Negative  NEGATIVE       Benzodiazepine Screen, Urine Negative Positive  NEGATIVE       Cocaine Screen, Urine Negative Negative  NEGATIVE       Methadone Screen , Urine Negative Negative  NEGATIVE          Details          This result is from an external source.               No results found for: \"PHENYTOIN\", \"PHENOBARB\", \"VALPROATE\", \"CBMZ\"    Lab Results   Component Value Date     01/02/2024    BUN 12 01/02/2024    CREATININE 0.84 01/02/2024    TSH 0.946 08/29/2023    WBC 8.20 01/02/2024       Brief Urine Lab Results  (Last result in the past 365 days)        Color   Clarity   Blood   Leuk Est   Nitrite   Protein   CREAT   Urine HCG        12/30/23 0458 Yellow   Clear   Negative   Negative   Negative   Negative                   Assessment & Plan       CVA (cerebral vascular accident)          Assessment:      delirium due to medical condition (CVA, SZ d/o, TBI)  Treatment Plan: Pt has a hx of drug OD suspected by family to have been intentional. OD occurred prior to this admission.   Pt has a hx of TBI, now has CVA.   Psychiatry following to assess SI  Pt is still intermittently confused, but at present time he denied any intention to harm himself  Pt has not expressed SI   No safety concerns reported by staff   Sitter in place now due to restlessness and confusion   Pt not agitated or aggressive   Cont CIWA protocol , cont sitter   Seizure precautions   Will follow   Treatment Plan discussed with: Patient and nursing     I discussed the patients findings and my recommendations with patient and nursing staff    I have reviewed and approved the behavioral health treatment plans and problem list. Yes     Referring MD has access to consult report and progress notes in EMR     Emani Osborne MD  01/03/24  13:42 EST            "

## 2024-01-03 NOTE — PLAN OF CARE
Goal Outcome Evaluation:  Plan of Care Reviewed With: patient        Pt continues to be non compliant with staying in bed and not picking at his lines. Pt remains on room air with good O2 sats. Pt remains on zero drips. Pt had a large BM at first of shift while trying to stand up and urinate in the corner of the room. The sitter failed to redirect the pt and keep them from standing up. RN redirected pt back to bed where pt was cleaned up. Pt remains only A&O to them self.Pt has stable VS. Pt got versed through the night 4 times in an effort to keep pt from eloping. RN had to add restraints after getting an order from NP Eileen Campap.   Pt scored a 8 on NIHSS  Pt scored a 18 on all CIWA  Pt now has Q4 Neuros.             Problem: Restraint, Nonviolent  Goal: Absence of Harm or Injury  Outcome: Unable to Meet, Plan Revised

## 2024-01-03 NOTE — CASE MANAGEMENT/SOCIAL WORK
Continued Stay Note   Jimmy     Patient Name: Neeraj Martin  MRN: [id number]  Today's Date: 1/3/2024    Admit Date: 12/29/2023    Plan: Plan to return home, mother to assist if needed -pending clinical course and PT/OT eval.   Discharge Plan       Row Name 01/03/24 0959       Plan    Plan Plan to return home, mother to assist if needed -pending clinical course and PT/OT eval.    Plan Comments Plan to return home, mother to assist if needed -pending clinical course and PT/OT eval. DC Barriers:  sitter, restraints, NIH 8, CIWA 18                 Expected Discharge Date and Time       Expected Discharge Date Expected Discharge Time    Jan 5, 2024               GM Crandall RN  SIPS/ICU   O: [phone]  C: [phone]  [email or web address]

## 2024-01-04 ENCOUNTER — READMISSION MANAGEMENT (OUTPATIENT)
Dept: CALL CENTER | Facility: HOSPITAL | Age: 39
End: 2024-01-04
Payer: MEDICAID

## 2024-01-04 VITALS
RESPIRATION RATE: 16 BRPM | WEIGHT: 189 LBS | DIASTOLIC BLOOD PRESSURE: 63 MMHG | OXYGEN SATURATION: 97 % | TEMPERATURE: 99.1 F | SYSTOLIC BLOOD PRESSURE: 112 MMHG | HEIGHT: 69 IN | HEART RATE: 83 BPM | BODY MASS INDEX: 27.99 KG/M2

## 2024-01-04 PROBLEM — I69.354 HEMIPARESIS AFFECTING LEFT SIDE AS LATE EFFECT OF CEREBROVASCULAR ACCIDENT: Chronic | Status: ACTIVE | Noted: 2024-01-04

## 2024-01-04 PROBLEM — F17.200 CURRENT SMOKER: Chronic | Status: ACTIVE | Noted: 2024-01-04

## 2024-01-04 PROBLEM — E78.5 DYSLIPIDEMIA: Chronic | Status: ACTIVE | Noted: 2024-01-04

## 2024-01-04 PROBLEM — F19.10 POLYSUBSTANCE ABUSE: Chronic | Status: ACTIVE | Noted: 2024-01-04

## 2024-01-04 PROBLEM — G40.909 SEIZURE DISORDER: Status: ACTIVE | Noted: 2024-01-04

## 2024-01-04 PROBLEM — Z86.73 HISTORY OF STROKE: Chronic | Status: ACTIVE | Noted: 2024-01-04

## 2024-01-04 PROBLEM — Z86.59 HISTORY OF SUICIDAL IDEATION: Chronic | Status: ACTIVE | Noted: 2024-01-04

## 2024-01-04 PROBLEM — R53.1 ACUTE LEFT-SIDED WEAKNESS: Status: ACTIVE | Noted: 2024-01-04

## 2024-01-04 PROBLEM — I62.00 SUBDURAL HEMATOMA, NONTRAUMATIC: Status: ACTIVE | Noted: 2024-01-04

## 2024-01-04 PROBLEM — Z91.89 HISTORY OF DRUG OVERDOSE: Chronic | Status: ACTIVE | Noted: 2024-01-04

## 2024-01-04 PROBLEM — Z72.0 VAPES NICOTINE CONTAINING SUBSTANCE: Chronic | Status: ACTIVE | Noted: 2024-01-04

## 2024-01-04 PROBLEM — Z87.820 HISTORY OF TRAUMATIC BRAIN INJURY: Chronic | Status: ACTIVE | Noted: 2024-01-04

## 2024-01-04 LAB
ALBUMIN SERPL-MCNC: 3.9 G/DL (ref 3.5–5.2)
ALBUMIN/GLOB SERPL: 1.4 G/DL
ALP SERPL-CCNC: 46 U/L (ref 39–117)
ALT SERPL W P-5'-P-CCNC: 17 U/L (ref 1–41)
ANION GAP SERPL CALCULATED.3IONS-SCNC: 11 MMOL/L (ref 5–15)
AST SERPL-CCNC: 21 U/L (ref 1–40)
BASOPHILS # BLD AUTO: 0 10*3/MM3 (ref 0–0.2)
BASOPHILS NFR BLD AUTO: 0.5 % (ref 0–1.5)
BILIRUB SERPL-MCNC: 0.5 MG/DL (ref 0–1.2)
BUN SERPL-MCNC: 15 MG/DL (ref 6–20)
BUN/CREAT SERPL: 20.8 (ref 7–25)
CALCIUM SPEC-SCNC: 9.1 MG/DL (ref 8.6–10.5)
CHLORIDE SERPL-SCNC: 106 MMOL/L (ref 98–107)
CO2 SERPL-SCNC: 25 MMOL/L (ref 22–29)
CREAT SERPL-MCNC: 0.72 MG/DL (ref 0.76–1.27)
DEPRECATED RDW RBC AUTO: 49.4 FL (ref 37–54)
EGFRCR SERPLBLD CKD-EPI 2021: 119.2 ML/MIN/1.73
EOSINOPHIL # BLD AUTO: 0.3 10*3/MM3 (ref 0–0.4)
EOSINOPHIL NFR BLD AUTO: 4.7 % (ref 0.3–6.2)
ERYTHROCYTE [DISTWIDTH] IN BLOOD BY AUTOMATED COUNT: 15.4 % (ref 12.3–15.4)
GLOBULIN UR ELPH-MCNC: 2.8 GM/DL
GLUCOSE SERPL-MCNC: 80 MG/DL (ref 65–99)
HCT VFR BLD AUTO: 45.1 % (ref 37.5–51)
HGB BLD-MCNC: 15 G/DL (ref 13–17.7)
LYMPHOCYTES # BLD AUTO: 2.5 10*3/MM3 (ref 0.7–3.1)
LYMPHOCYTES NFR BLD AUTO: 40.7 % (ref 19.6–45.3)
MCH RBC QN AUTO: 29.6 PG (ref 26.6–33)
MCHC RBC AUTO-ENTMCNC: 33.4 G/DL (ref 31.5–35.7)
MCV RBC AUTO: 88.6 FL (ref 79–97)
MONOCYTES # BLD AUTO: 0.7 10*3/MM3 (ref 0.1–0.9)
MONOCYTES NFR BLD AUTO: 10.9 % (ref 5–12)
NEUTROPHILS NFR BLD AUTO: 2.7 10*3/MM3 (ref 1.7–7)
NEUTROPHILS NFR BLD AUTO: 43.2 % (ref 42.7–76)
NRBC BLD AUTO-RTO: 0.2 /100 WBC (ref 0–0.2)
PLATELET # BLD AUTO: 204 10*3/MM3 (ref 140–450)
PMV BLD AUTO: 10 FL (ref 6–12)
POTASSIUM SERPL-SCNC: 3.8 MMOL/L (ref 3.5–5.2)
PROT SERPL-MCNC: 6.7 G/DL (ref 6–8.5)
RBC # BLD AUTO: 5.09 10*6/MM3 (ref 4.14–5.8)
SODIUM SERPL-SCNC: 142 MMOL/L (ref 136–145)
WBC NRBC COR # BLD AUTO: 6.3 10*3/MM3 (ref 3.4–10.8)

## 2024-01-04 PROCEDURE — 25010000002 LACOSAMIDE 200 MG/20ML SOLUTION: Performed by: PSYCHIATRY & NEUROLOGY

## 2024-01-04 PROCEDURE — 25010000002 MIDAZOLAM PER 1 MG: Performed by: NURSE PRACTITIONER

## 2024-01-04 PROCEDURE — 97112 NEUROMUSCULAR REEDUCATION: CPT

## 2024-01-04 PROCEDURE — C9254 INJECTION, LACOSAMIDE: HCPCS | Performed by: PSYCHIATRY & NEUROLOGY

## 2024-01-04 PROCEDURE — 80053 COMPREHEN METABOLIC PANEL: CPT | Performed by: NURSE PRACTITIONER

## 2024-01-04 PROCEDURE — 85025 COMPLETE CBC W/AUTO DIFF WBC: CPT | Performed by: NURSE PRACTITIONER

## 2024-01-04 PROCEDURE — 25010000002 LEVETRIRACETAM PER 10 MG: Performed by: PSYCHIATRY & NEUROLOGY

## 2024-01-04 PROCEDURE — 25010000002 THIAMINE HCL 200 MG/2ML SOLUTION: Performed by: NURSE PRACTITIONER

## 2024-01-04 RX ORDER — ATORVASTATIN CALCIUM 80 MG/1
80 TABLET, FILM COATED ORAL NIGHTLY
Qty: 30 TABLET | Refills: 11 | Status: SHIPPED | OUTPATIENT
Start: 2024-01-04

## 2024-01-04 RX ORDER — LEVETIRACETAM 750 MG/1
1500 TABLET ORAL 2 TIMES DAILY
Qty: 60 TABLET | Refills: 11 | Status: SHIPPED | OUTPATIENT
Start: 2024-01-04

## 2024-01-04 RX ORDER — MELATONIN
2000 DAILY
Qty: 60 TABLET | Refills: 0 | Status: SHIPPED | OUTPATIENT
Start: 2024-01-05

## 2024-01-04 RX ORDER — LACOSAMIDE 200 MG/1
200 TABLET ORAL 2 TIMES DAILY
Qty: 60 TABLET | Refills: 11 | Status: SHIPPED | OUTPATIENT
Start: 2024-01-04

## 2024-01-04 RX ORDER — FOLIC ACID 1 MG/1
1 TABLET ORAL DAILY
Qty: 30 TABLET | Refills: 0 | Status: SHIPPED | OUTPATIENT
Start: 2024-01-05

## 2024-01-04 RX ORDER — ASPIRIN 81 MG/1
81 TABLET ORAL DAILY
Qty: 30 TABLET | Refills: 11 | Status: SHIPPED | OUTPATIENT
Start: 2024-01-04

## 2024-01-04 RX ORDER — PYRIDOXINE HCL (VITAMIN B6) 100 MG
100 TABLET ORAL DAILY
Qty: 30 TABLET | Refills: 0 | Status: SHIPPED | OUTPATIENT
Start: 2024-01-05

## 2024-01-04 RX ORDER — GAUZE BANDAGE 2" X 2"
100 BANDAGE TOPICAL DAILY
Qty: 30 TABLET | Refills: 0 | Status: SHIPPED | OUTPATIENT
Start: 2024-01-06

## 2024-01-04 RX ADMIN — LEVOCARNITINE 500 MG: 1 SOLUTION ORAL at 07:52

## 2024-01-04 RX ADMIN — THIAMINE HYDROCHLORIDE 200 MG: 100 INJECTION, SOLUTION INTRAMUSCULAR; INTRAVENOUS at 06:41

## 2024-01-04 RX ADMIN — LACOSAMIDE 200 MG: 10 INJECTION, SOLUTION INTRAVENOUS at 06:41

## 2024-01-04 RX ADMIN — Medication 100 MG: at 08:02

## 2024-01-04 RX ADMIN — VALPROIC ACID 750 MG: 250 SOLUTION ORAL at 03:46

## 2024-01-04 RX ADMIN — Medication 10 ML: at 08:03

## 2024-01-04 RX ADMIN — VALPROIC ACID 750 MG: 250 SOLUTION ORAL at 08:01

## 2024-01-04 RX ADMIN — MIDAZOLAM 2 MG: 1 INJECTION INTRAMUSCULAR; INTRAVENOUS at 03:46

## 2024-01-04 RX ADMIN — LEVETIRACETAM 1500 MG: 100 INJECTION, SOLUTION INTRAVENOUS at 08:01

## 2024-01-04 RX ADMIN — Medication 2000 UNITS: at 08:02

## 2024-01-04 RX ADMIN — FOLIC ACID 1 MG: 1 TABLET ORAL at 08:03

## 2024-01-04 RX ADMIN — Medication 1 PATCH: at 08:00

## 2024-01-04 NOTE — NURSING NOTE
Pt has continuously attempted to get out of bed. Pulled lines and monitor leads/ gown off. Sitter at bedside. Has required PRN agitations meds multiple times.

## 2024-01-04 NOTE — DISCHARGE SUMMARY
[facility] Medicine Services  Discharge Summary    Date of Service: 2024   Patient Name: Neeraj Martin  : 1985  MRN: [id number]    Date of Admission: 2023  Discharge Diagnosis: see below  Date of Discharge:  2024  Primary Care Physician: Nanette Sterling APRN    Presenting Problem:   Aphasia [R47.01]  Seizure [R56.9]  Acute left-sided weakness [R53.1]  CVA (cerebral vascular accident) [I63.9]    Active and Resolved Hospital Problems:  Active Hospital Problems    Diagnosis POA    **CVA (cerebral vascular accident) [I63.9] Yes    Acute left-sided weakness [R53.1] Yes    Seizure disorder [G40.909] Yes    Subdural hematoma, nontraumatic [I62.00] No    History of traumatic brain injury [Z87.820] Not Applicable    History of stroke [Z86.73] Not Applicable    Dyslipidemia [E78.5] Yes    History of suicidal ideation [Z86.59] Not Applicable    History of drug overdose [Z91.89] Not Applicable    Polysubstance abuse [F19.10] Yes    Hemiparesis affecting left side as late effect of cerebrovascular accident [I69.354] Not Applicable    Current smoker [F17.200] Yes    Vapes nicotine containing substance [Z72.0] Yes    Hepatitis C virus infection [B19.20] Yes      Resolved Hospital Problems    Diagnosis POA    Inmate in correctional facility [Z65.1] Not Applicable     Hospital Course     HPI:  Per the documentation by Dr. Morgan, dated 2023, patient is a 38 y.o. male with PMH of traumatic brain injury, seizures, alcohol abuse, drug abuse presented to the hospital for altered mental status and was admitted with a principal diagnosis of CVA (cerebral vascular accident).  Presumed to be acute stroke versus seizure, loaded with Keppra.  CT head with areas of encephalomalacia in right parietal lobe, left frontotemporal areas.  CTA with no large vessel occlusion.  Neurology was consulted, s/p tenecteplase.  MRI brain with no evidence of acute infarct but did show multifocal  encephalomalacia in the right parietal, left frontotemporal lobes.  Subsequently, repeat CT head in 24 hours showed small left frontal parafalcine subdural hematoma without mass effect.  Neurosurgery was consulted and recommended follow-up in office in 4 weeks.      Hospital course:  He was transferred out of the ICU on 12/31 to the hospitalist service.  He had no further seizure activity since 12/30.  His echocardiogram was completed with findings as noted.  The first half of his hospital stay, he was restrained in the bed as he was a prisoner.  They furloughed him from prison a couple of days prior to discharge, which is fortunate.  Skilled rehab was recommended, but the patient and his mother declined.  They were agreeable to home health however.  He will be discharged home with outpatient neurology follow-up with his current neurologist on seizure medications as noted below.  He was strongly discouraged from using illegal substances.  Smoking cessation was highly recommended.    Day of Discharge     Vital Signs:  Temp:  [97.5 °F (36.4 °C)-99.1 °F (37.3 °C)] 99.1 °F (37.3 °C)  Heart Rate:  [] 83  Resp:  [14-16] 16  BP: (100-144)/(63-87) 112/63  Flow (L/min):  [2] 2    Physical Exam  GEN: Disheveled, no acute distress, soft restraints in place  HEENT: NCAT, PERRLA, moist mucous membranes  NECK: Supple, midline trachea  CARD: RRR, no appreciable M/R/G, no peripheral edema  PULM: Fairly CTAB, non-distressed  ABD: soft, NTND, normoactive bowel sounds throughout  NEURO: Grossly intact, chronic left hand weakness  PSYCH: Pleasant    Pertinent  and/or Most Recent Results     LAB RESULTS:      Lab 01/04/24  0757 01/03/24  1944 01/02/24  1248 01/01/24  0633 12/31/23  0830 12/31/23  0542 12/29/23  2210   WBC 6.30 7.30 8.20 10.10 8.90  --  6.40   HEMOGLOBIN 15.0 14.3 15.2 16.2 14.5  --  14.2   HEMATOCRIT 45.1 41.8 45.6 48.7 43.0  --  43.6   PLATELETS 204 197 201 187 180  --  202   NEUTROS ABS 2.70 3.50 4.80 6.50 5.90   --  3.40   LYMPHS ABS 2.50 2.70 2.20 2.40 1.90  --  2.30   MONOS ABS 0.70 0.80 0.90 1.00* 1.00*  --  0.50   EOS ABS 0.30 0.30 0.30 0.20 0.00  --  0.20   MCV 88.6 86.6 89.2 89.4 90.0  --  89.3   LACTATE  --   --   --   --  1.6 2.5*  --    PROTIME  --   --   --   --   --   --  11.5   APTT  --   --   --   --   --   --  25.1         Lab 01/04/24 0757 01/03/24 1944 01/02/24 1248 01/01/24  0633 12/31/23  0830 12/29/23  2323   SODIUM 142 142 143 140 142  --    POTASSIUM 3.8 3.6 4.0 3.6 4.0  --    CHLORIDE 106 107 105 101 104  --    CO2 25.0 25.0 30.0* 29.0 29.0  --    ANION GAP 11.0 10.0 8.0 10.0 9.0  --    BUN 15 13 12 9 8  --    CREATININE 0.72* 0.66* 0.84 0.69* 0.66*  --    EGFR 119.2 122.4 113.8 120.7 122.4  --    GLUCOSE 80 92 86 84 101*  --    CALCIUM 9.1 9.0 9.0 9.4 9.1  --    HEMOGLOBIN A1C  --   --   --   --   --  5.60         Lab 01/04/24 0757 01/03/24 1944 01/02/24 1248 01/01/24  0633 12/31/23  0830   TOTAL PROTEIN 6.7 6.4 6.3 7.4 7.0   ALBUMIN 3.9 3.7 3.7 4.4 4.1   GLOBULIN 2.8 2.7 2.6 3.0 2.9   ALT (SGPT) 17 15 11 17 9   AST (SGOT) 21 17 12 18 13   BILIRUBIN 0.5 0.5 0.5 0.9 0.5   ALK PHOS 46 44 46 51 45         Lab 12/30/23  0649 12/29/23  2210   HSTROP T 8  --    PROTIME  --  11.5   INR  --  1.06         Lab 12/29/23  2323   CHOLESTEROL 182   LDL CHOL 121*   HDL CHOL 47   TRIGLYCERIDES 77         Lab 12/29/23 2236   ABO TYPING O   RH TYPING Positive   ANTIBODY SCREEN Negative         Brief Urine Lab Results  (Last result in the past 365 days)        Color   Clarity   Blood   Leuk Est   Nitrite   Protein   CREAT   Urine HCG        12/30/23 0458 Yellow   Clear   Negative   Negative   Negative   Negative                 Microbiology Results (last 10 days)       ** No results found for the last 240 hours. **            CT Head Without Contrast    Result Date: 12/31/2023  Impression: Impression: 1.Stable 4 mm subdural hematoma along the left side of the posterior falx. 2.Stable chronic infarcts in the right  parietal lobe, left frontal lobe and left temporal lobe. 3.Stable changes of prior right-sided craniectomy and cranioplasty. Electronically Signed: Jah Jiang MD  12/31/2023 4:08 AM EST  Workstation ID: [id number]    CT Head Without Contrast    Result Date: 12/30/2023  Impression: Impression: 1.New small posterior left frontal parafalcine subdural hematoma without mass effect. 2.Stable chronic infarcts in the right parietal lobe, left temporal lobe and left inferior frontal lobe. 3.Evidence of prior right convexity craniectomy with cranioplasty. Electronically Signed: Jah Jiang MD  12/30/2023 11:22 PM EST  Workstation ID: [id number]    MRI Brain Without Contrast    Result Date: 12/30/2023  Impression: Impression: Limited exam as described. There is no evidence of acute brain ischemia. There is multifocal encephalomalacia involving the right parietal, left frontal, and left temporal lobes Electronically Signed: Afshin Schwartz  12/30/2023 5:00 PM EST  Workstation ID: [id number]    XR Abdomen KUB    Result Date: 12/30/2023  Impression: Impression: MRI screening KUB with no evidence of metallic foreign body. Electronically Signed: Robles Dias MD  12/30/2023 12:56 PM EST  Workstation ID: [id number]    XR Chest 1 View    Result Date: 12/29/2023  Impression: Impression: Hypoinflated lungs with left basilar airspace disease which may relate to atelectasis, aspiration or pneumonia difficult to exclude. Electronically Signed: Johnson Campbell MD  12/29/2023 11:23 PM EST  Workstation ID: [id number]    CT Angiogram Head w AI Analysis of LVO    Result Date: 12/29/2023  Impression: Impression: 1. No central intracranial large vessel occlusion. 2. Patent bilateral carotid arteries. 3. Patent bilateral vertebral arteries. 4. Question of short segment moderate stenosis of left MCA M2 branch. Intracranial venous contamination and suboptimal contrast opacification limits assessment. 5. Areas of suspected chronic encephalomalacia at right  posterior parietal lobe, left frontal lobe and left temporal lobe. Electronically Signed: Johnson Campbell MD  12/29/2023 11:08 PM EST  Workstation ID: [id number]    CT Angiogram Neck    Result Date: 12/29/2023  Impression: Impression: 1. No central intracranial large vessel occlusion. 2. Patent bilateral carotid arteries. 3. Patent bilateral vertebral arteries. 4. Question of short segment moderate stenosis of left MCA M2 branch. Intracranial venous contamination and suboptimal contrast opacification limits assessment. 5. Areas of suspected chronic encephalomalacia at right posterior parietal lobe, left frontal lobe and left temporal lobe. Electronically Signed: Johnson Campbell MD  12/29/2023 11:08 PM EST  Workstation ID: [id number]    CT CEREBRAL PERFUSION WITH & WITHOUT CONTRAST    Result Date: 12/29/2023  Impression: Impression: 1. No core infarct. 2. Area of prolonged transit time in the left frontal lobe which could be artifactual due to volume averaging with the calvarium given positioning. This may also relate to an area of chronic encephalomalacia secondary to remote infarct seen on noncontrast CT. Consider brain MR follow-up if concern for superimposed ischemia at this site. Electronically Signed: Johnson Campbell MD  12/29/2023 10:45 PM EST  Workstation ID: [id number]    CT Head Without Contrast Stroke Protocol    Result Date: 12/29/2023  Impression: Impression: 1. No intracranial hemorrhage. 2. Areas of encephalomalacia involving right posterior parietal lobe, left frontal lobe and left temporal lobe likely sequela of remote infarcts. 3. Postsurgical changes of right sided craniectomy. Electronically Signed: Johnson Campbell MD  12/29/2023 10:27 PM EST  Workstation ID: [id number]       Results for orders placed during the hospital encounter of 12/29/23    Adult Transthoracic Echo Complete w/ Color, Spectral and Contrast if Necessary Per Protocol    Interpretation Summary    Left ventricular systolic function is normal. Left  ventricular ejection fraction appears to be 66 - 70%.    Left ventricular diastolic function is consistent with (grade I) impaired relaxation.    Saline test results are negative.    Consults:   Consults       Date and Time Order Name Status Description    1/2/2024  2:02 PM Inpatient Psychiatrist Consult Completed     12/31/2023  9:34 AM Inpatient Hospitalist Consult Completed     12/31/2023  4:55 AM Inpatient Neurosurgery Consult Completed     12/29/2023 11:15 PM Inpatient Neurology Consult Stroke Completed     12/29/2023 10:12 PM Inpatient Neurology Consult Stroke      12/29/2023 10:12 PM Inpatient Neurology Consult Stroke              Discharge Details        Discharge Medications        New Medications        Instructions Start Date   aspirin 81 MG EC tablet   81 mg, Oral, Daily      atorvastatin 80 MG tablet  Commonly known as: LIPITOR   80 mg, Oral, Nightly      cholecalciferol 25 MCG (1000 UT) tablet  Commonly known as: VITAMIN D3   2,000 Units, Oral, Daily   Start Date: January 5, 2024     folic acid 1 MG tablet  Commonly known as: FOLVITE   1 mg, Oral, Daily   Start Date: January 5, 2024     lacosamide 200 MG tablet  Commonly known as: Vimpat   200 mg, Oral, 2 Times Daily      pyridoxine 100 MG tablet  Commonly known as: VITAMIN B-6   100 mg, Oral, Daily   Start Date: January 5, 2024     thiamine 100 MG tablet  Commonly known as: VITAMIN B1   100 mg, Oral, Daily   Start Date: January 6, 2024            Changes to Medications        Instructions Start Date   levETIRAcetam 750 MG tablet  Commonly known as: Keppra  What changed:   medication strength  how much to take   1,500 mg, Oral, 2 Times Daily             Continue These Medications        Instructions Start Date   Cariprazine HCl 4.5 MG capsule capsule  Commonly known as: Vraylar   4.5 mg, Oral, Daily      divalproex 500 MG DR tablet  Commonly known as: DEPAKOTE   500 mg, Oral, 2 Times Daily      Narcan 4 MG/0.1ML nasal spray  Generic drug: naloxone   2  Times Daily PRN      OLANZapine 5 MG tablet  Commonly known as: zyPREXA   5 mg, Oral, 2 Times Daily               No Known Allergies    Discharge Disposition:   Home-Health Care Svc    Diet:  Hospital:  Diet Order   Procedures    Diet: Regular/House Diet; Safe Tray; Texture: Regular Texture (IDDSI 7); Fluid Consistency: Thin (IDDSI 0)       Discharge Activity:   Activity Instructions       Activity as Tolerated      Gradually Increase Activity Until at Pre-Hospitalization Level              CODE STATUS:  Code Status and Medical Interventions:   Ordered at: 12/31/23 0757     Code Status (Patient has no pulse and is not breathing):    CPR (Attempt to Resuscitate)     Medical Interventions (Patient has pulse or is breathing):    Full Support       Future Appointments   Date Time Provider Department Center   2/15/2024  1:00 PM Jesus Iglesias APRN [facility]       Additional Instructions for the Follow-ups that You Need to Schedule       Ambulatory Referral to Home Health   As directed      Face to Face Visit Date: 1/4/2024   Follow-up provider for Plan of Care?: I treated the patient in an acute care facility and will not continue treatment after discharge.   Follow-up provider: JESUS IGLESIAS [[id number]]   Reason/Clinical Findings: post-stroke   Describe mobility limitations that make leaving home difficult: limited mobility, chronic left hemiparesis   Nursing/Therapeutic Services Requested: Skilled Nursing Physical Therapy   Skilled nursing orders: Medication education Cardiopulmonary assessments   PT orders: Gait Training Strengthening Home safety assessment   Weight Bearing Status: Full Weight Bearing   Frequency: 1 Week 1        Call MD With Problems / Concerns   As directed      Instructions: PCM vs appropriate specialist    Order Comments: Instructions: PCM vs appropriate specialist         Discharge Follow-up with PCP   As directed       Currently Documented PCP:    Jesus Iglesias APRN    PCP Phone  Number:    [phone]     Follow Up Details: within one week of discharge        Discharge Follow-up with Specialty: Neurology, Neurosurgery, and Psychiatry follow up as per their specific services   As directed      Specialty: Neurology, Neurosurgery, and Psychiatry follow up as per their specific services              Time spent on Discharge including face to face service:  40 minutes    Signature: Electronically signed by Leroy Centeno MD, 01/04/24, 12:25 EST.  [facility]

## 2024-01-04 NOTE — PAYOR COMM NOTE
"This is discharge notification for Neeraj Martin  Reference/Auth # [id number]   Pt discharged routine to home on 1/4/24.    MARILIN Campbell, RN, [facility]  Utilization Review Nurse  [facility]  Direct & confidential phone # [phone]  Fax # [phone]      Neeraj Martin (38 y.o. Male)       Date of Birth   1985    Social Security Number       Address   [address] [place] [address]    Home Phone       MRN   [id number]       Yarsanism   None    Marital Status                               Admission Date   12/29/23    Admission Type   Emergency    Admitting Provider   Milo Dalal DO    Attending Provider       Department, Room/Bed   [facility] INTENSIVE CARE UNIT, 2314/1       Discharge Date   1/4/2024    Discharge Disposition   Home-Health Care [facility]    Discharge Destination                                 Attending Provider: (none)   Allergies: No Known Allergies    Isolation: None   Infection: None   Code Status: CPR    Ht: 175.3 cm (69\")   Wt: 85.7 kg (189 lb)    Admission Cmt: None   Principal Problem: CVA (cerebral vascular accident) [I63.9]                   Active Insurance as of 12/29/2023       Primary Coverage       Payor Plan Insurance Group Employer/Plan Group    AMBETTER MHS IND EXCHANGE AMB[employer]TER [employer] IND EXCHANGE        Payor Plan Address Payor Plan Phone Number Payor Plan Fax Number Effective Dates    [facility] [address] [phone]  11/1/2023 - None Entered    [facility] [address]         Subscriber Name Subscriber Birth Date Member ID       NEERAJ MARTIN 1985 [id number]               Secondary Coverage       Payor Plan Insurance Group Employer/Plan Group    CORRECTIONAL FACILITY [facility]       Coverage Address Coverage Phone Number Coverage Fax Number Effective Dates    [address] [phone]  12/29/2023 - None Entered    [facility] [address]         Subscriber Name Subscriber Birth Date Member ID       NEERAJ MARTIN 1985 " [id number]               Tertiary Coverage       Payor Plan Insurance Group Employer/Plan Group    [facility] CSIN       Payor Plan Address Payor Plan Phone Number Payor Plan Fax Number Effective Dates    [address]   7/1/2023 - None Entered    [facility] [address]         Subscriber Name Subscriber Birth Date Member ID       VICKY STILES 1985 [id number]                     Emergency Contacts        (Rel.) Home Phone Work Phone Mobile Phone    CARLO STILES (Spouse) -- -- [phone]    Audrey Edouard (Mother) -- -- [phone]              Discharge Summary    No notes of this type exist for this encounter.

## 2024-01-04 NOTE — THERAPY TREATMENT NOTE
"Subjective: Pt agreeable to therapeutic plan of care. A&O X 4 with R/L confusion and delayed responses    Objective:     Bed mobility - Independent  Transfers - SBA  Ambulation - 60 feet SBA and CGA    Pt performed forward bend for functional activities - adjusting bed linens; side stepping, tandem stance with no LOB.   Pt performed finger opposition and FTN with decreased accuracy/coordination LUE.      Vitals: WNL    Pain: 0 VAS   Location: n/a  Intervention for pain: N/A    Education: Provided education on the importance of mobility in the acute care setting    Assessment: Neeraj Martin presents with functional mobility impairments which indicate the need for skilled intervention. Tolerating session today without incident. Pt demonstrated improved bed mobility, gait and impulse control. Pt calm and able to follow PT's direction with delays.  Pt's mother present and appears comfortable with assisting pt, having helped him prior. Will continue to follow and progress as tolerated.     Plan/Recommendations:   If medically appropriate, Low Intensity Therapy recommended post-acute care - This is recommended as therapy feels this patient would require 2-3 visits per week. OP or HH would be the best option depending on patient's home bound status. Consider, if the patient has other  \"skilled\" needs such as wounds, IV antibiotics, etc. Combined with \"low intensity\" could also equate to a SNF. If patient is medically complex, consider LTAC. Pt requires no DME at discharge.     Pt desires Home with family assist and and Home Health at discharge. Pt cooperative; agreeable to therapeutic recommendations and plan of care.         Basic Mobility 6-click:  Rollin = Total, A lot = 2, A little = 3; 4 = None  Supine>Sit:   1 = Total, A lot = 2, A little = 3; 4 = None   Sit>Stand with arms:  1 = Total, A lot = 2, A little = 3; 4 = None  Bed>Chair:   1 = Total, A lot = 2, A little = 3; 4 = None  Ambulate in room:  1 = " Total, A lot = 2, A little = 3; 4 = None  3-5 Steps with railin = Total, A lot = 2, A little = 3; 4 = None  Score: 21    Modified [place]: 3 = Moderate disability (Requires some help, but able to walk unassisted)    Post-Tx Position: Supine with HOB Elevated, Staff Present, and Call light and personal items within reach  PPE: gloves

## 2024-01-04 NOTE — CASE MANAGEMENT/SOCIAL WORK
Continued Stay Note   [facility]     Patient Name: Neeraj Martin  MRN: [id number]  Today's Date: 1/4/2024    Admit Date: 12/29/2023    Plan: Plan to return home, mother to assist if needed -pending clinical course and PT/OT eval.   Discharge Plan       Row Name 01/04/24 1011       Plan    Plan Plan to return home, mother to assist if needed -pending clinical course and PT/OT eval.    Plan Comments Plan to return home, mother to assist if needed -pending clinical course and PT/OT eval.  DC Barriers:  sitter, restraints, fluctuating NIH and CIWA, agitated.                 Expected Discharge Date and Time       Expected Discharge Date Expected Discharge Time    Jan 5, 2024               GM Crandall RN  SIPS/ICU   O: [phone]  C: [phone]  [email or web address]

## 2024-01-04 NOTE — NURSING NOTE
Pt. Discharged home with mother @ 5256. Stable upon discharge and was taken to person vehicle via wheelchair. IV removed with catheter intact. AVS was given to mother and explained to pt. and mother and they verbalized understanding. All personal items were sent home with patent. Pt.'s mother got RX's from hospital pharmacy.

## 2024-01-04 NOTE — PLAN OF CARE
Goal Outcome Evaluation:            Neeraj Martin presents with functional mobility impairments which indicate the need for skilled intervention. Tolerating session today without incident. Pt demonstrated improved bed mobility, gait and impulse control. Pt calm and able to follow PT's direction with delays.  Pt's mother present and appears comfortable with assisting pt, having helped him prior. Will continue to follow and progress as tolerated.

## 2024-01-04 NOTE — PAYOR COMM NOTE
"This is discharge notification for Neeraj Martin   Reference/Auth # [id number]   Pt discharged routine to home on 1/4/24.    MARILIN Campbell, RN, [facility]  Utilization Review Nurse  [facility]  Direct & confidential phone # [phone]  Fax # [phone]      Neeraj Martin (38 y.o. Male)       Date of Birth   1985    Social Security Number       Address   [address] [place] [address]    Home Phone       MRN   [id number]       Hinduism   None    Marital Status                               Admission Date   12/29/23    Admission Type   Emergency    Admitting Provider   Milo Dalal DO    Attending Provider       Department, Room/Bed   [facility] INTENSIVE CARE UNIT, 2314/1       Discharge Date   1/4/2024    Discharge Disposition   Home-Health Care [facility]    Discharge Destination                                 Attending Provider: (none)   Allergies: No Known Allergies    Isolation: None   Infection: None   Code Status: CPR    Ht: 175.3 cm (69\")   Wt: 85.7 kg (189 lb)    Admission Cmt: None   Principal Problem: CVA (cerebral vascular accident) [I63.9]                   Active Insurance as of 12/29/2023       Primary Coverage       Payor Plan Insurance Group Employer/Plan Group    AMBETTER MHS IND EXCHANGE AMBETTER [employer] IND EXCHANGE        Payor Plan Address Payor Plan Phone Number Payor Plan Fax Number Effective Dates    [facility] [address] [phone]  11/1/2023 - None Entered    [facility] [address]         Subscriber Name Subscriber Birth Date Member ID       NEERAJ MARTIN 1985 [id number]               Secondary Coverage       Payor Plan Insurance Group Employer/Plan Group    CORRECTIONAL FACILITY [facility]       Coverage Address Coverage Phone Number Coverage Fax Number Effective Dates    [address] [phone]  12/29/2023 - None Entered    [facility] [address]         Subscriber Name Subscriber Birth Date Member ID       NEERAJ MARTIN 1985 [id number] "               Tertiary Coverage       Payor Plan Insurance Group Employer/Plan Group    [facility] CSIN       Payor Plan Address Payor Plan Phone Number Payor Plan Fax Number Effective Dates    [address]   7/1/2023 - None Entered    [facility] [address]         Subscriber Name Subscriber Birth Date Member ID       VICKY STILES 1985 [id number]                     Emergency Contacts        (Rel.) Home Phone Work Phone Mobile Phone    CARLO STILES (Spouse) -- -- [phone]    Audrey Edouard (Mother) -- -- [phone]              Discharge Summary    No notes of this type exist for this encounter.

## 2024-01-04 NOTE — NURSING NOTE
Patient writhing all over bed, will not follow simple direction. Has been thoroughly educated multiple times about not pulling at medical equipment. Patient chewed out IV site and tossed it on bedside table. Then proceeded to attempt to get out of bed. Patient had to be placed back in restraints.

## 2024-01-05 ENCOUNTER — TRANSITIONAL CARE MANAGEMENT TELEPHONE ENCOUNTER (OUTPATIENT)
Dept: CALL CENTER | Facility: HOSPITAL | Age: 39
End: 2024-01-05
Payer: MEDICAID

## 2024-01-05 NOTE — CASE MANAGEMENT/SOCIAL WORK
Case Management Discharge Note      Final Note: Home         Selected Continued Care - Discharged on 1/4/2024 Admission date: 12/29/2023 - Discharge disposition: Home-Health Care c   Transportation Services  Private: Car    Final Discharge Disposition Code: 01 - home or self-care    GM Crandall RN  SIPS/ICU   O: [phone]  C: [phone]  [email or web address]

## 2024-01-05 NOTE — OUTREACH NOTE
Call Center TCM Note      Flowsheet Row Responses   [facility] facility patient discharged from? [facility]   Does the patient have one of the following disease processes/diagnoses(primary or secondary)? Stroke   TCM attempt successful? No   Unsuccessful attempts Attempt 1   Revoked Reason Phone Issues  [number disconnected]            Adam Cevallos RN    1/5/2024, 14:05 EST

## 2024-01-05 NOTE — OUTREACH NOTE
Prep Survey      Flowsheet Row Responses   Yazidism facility patient discharged from? [facility]   Is LACE score < 7 ? No   Eligibility [facility]   [facility]   Date of Admission 12/29/23   Date of Discharge 01/04/24   Discharge Disposition Home-Health Care S[facility]   Discharge diagnosis CVA   Does the patient have one of the following disease processes/diagnoses(primary or secondary)? Stroke   Does the patient have Home health ordered? No   Is there a DME ordered? No   Prep survey completed? Yes            WAYNE A - Registered Nurse

## 2025-06-24 NOTE — PLAN OF CARE
Goal Outcome Evaluation:         Pt. Had no seizures on shift. Pt.'s NIH at beginning of shift was 5. CIWAs fluctuate but most recent was a 9. Plan is to D/C pt. tomorrow and Neuro and Psych were both notified of plan. Restraints were d/c'd today and sitter remained at bedside. Pt. Mother at bedside and verbalized understanding of plan as well.                 Omer, Patient is not working now.